# Patient Record
Sex: MALE | Race: WHITE | NOT HISPANIC OR LATINO
[De-identification: names, ages, dates, MRNs, and addresses within clinical notes are randomized per-mention and may not be internally consistent; named-entity substitution may affect disease eponyms.]

---

## 2018-02-15 ENCOUNTER — APPOINTMENT (OUTPATIENT)
Dept: INTERNAL MEDICINE | Facility: CLINIC | Age: 68
End: 2018-02-15

## 2018-03-12 ENCOUNTER — NON-APPOINTMENT (OUTPATIENT)
Age: 68
End: 2018-03-12

## 2018-03-12 ENCOUNTER — APPOINTMENT (OUTPATIENT)
Dept: INTERNAL MEDICINE | Facility: CLINIC | Age: 68
End: 2018-03-12
Payer: COMMERCIAL

## 2018-03-12 VITALS
RESPIRATION RATE: 16 BRPM | WEIGHT: 143 LBS | OXYGEN SATURATION: 94 % | BODY MASS INDEX: 20.47 KG/M2 | HEART RATE: 76 BPM | TEMPERATURE: 97.9 F | DIASTOLIC BLOOD PRESSURE: 80 MMHG | HEIGHT: 70 IN | SYSTOLIC BLOOD PRESSURE: 140 MMHG

## 2018-03-12 DIAGNOSIS — Z87.39 PERSONAL HISTORY OF OTHER DISEASES OF THE MUSCULOSKELETAL SYSTEM AND CONNECTIVE TISSUE: ICD-10-CM

## 2018-03-12 DIAGNOSIS — Z78.9 OTHER SPECIFIED HEALTH STATUS: ICD-10-CM

## 2018-03-12 DIAGNOSIS — N26.1 ATROPHY OF KIDNEY (TERMINAL): ICD-10-CM

## 2018-03-12 DIAGNOSIS — M48.00 SPINAL STENOSIS, SITE UNSPECIFIED: ICD-10-CM

## 2018-03-12 DIAGNOSIS — Z82.62 FAMILY HISTORY OF OSTEOPOROSIS: ICD-10-CM

## 2018-03-12 DIAGNOSIS — Z82.49 FAMILY HISTORY OF ISCHEMIC HEART DISEASE AND OTHER DISEASES OF THE CIRCULATORY SYSTEM: ICD-10-CM

## 2018-03-12 PROCEDURE — 99387 INIT PM E/M NEW PAT 65+ YRS: CPT | Mod: 25

## 2018-03-12 PROCEDURE — 93000 ELECTROCARDIOGRAM COMPLETE: CPT

## 2018-03-13 ENCOUNTER — LABORATORY RESULT (OUTPATIENT)
Age: 68
End: 2018-03-13

## 2018-03-14 ENCOUNTER — LABORATORY RESULT (OUTPATIENT)
Age: 68
End: 2018-03-14

## 2018-03-26 ENCOUNTER — TRANSCRIPTION ENCOUNTER (OUTPATIENT)
Age: 68
End: 2018-03-26

## 2018-07-02 ENCOUNTER — APPOINTMENT (OUTPATIENT)
Dept: INTERNAL MEDICINE | Facility: CLINIC | Age: 68
End: 2018-07-02
Payer: COMMERCIAL

## 2018-07-02 VITALS
HEART RATE: 61 BPM | RESPIRATION RATE: 16 BRPM | WEIGHT: 136 LBS | HEIGHT: 70 IN | SYSTOLIC BLOOD PRESSURE: 140 MMHG | BODY MASS INDEX: 19.47 KG/M2 | DIASTOLIC BLOOD PRESSURE: 80 MMHG | TEMPERATURE: 98.6 F | OXYGEN SATURATION: 97 %

## 2018-07-02 PROCEDURE — 99214 OFFICE O/P EST MOD 30 MIN: CPT

## 2018-07-02 NOTE — HISTORY OF PRESENT ILLNESS
[de-identified] : 67 year old  male patient with history of stable Hypertension, Hypercholesterolemia, Hyperkalemia, Elevated PTH, history as stated, presented for follow up examination of Elevated BP checked. Patient is compliant with all medications. Denies shortness of breath, chest pain or abdominal pains at this time. ROS as stated.\par

## 2018-07-02 NOTE — ASSESSMENT
[FreeTextEntry1] : 67 year old male found to have stable Hypertension, Hypercholesterolemia, Hyperkalemia, Elevated PTH,with the current regimen, diet and life style modifications, as counseled. Prior results reviewed and discussed with the patient during today's examination. Plan as ordered.\par Patient is refusing all immunizations, in spite of counseling risks and benefits.\par

## 2018-07-02 NOTE — HEALTH RISK ASSESSMENT
[No falls in past year] : Patient reported no falls in the past year [0] : 2) Feeling down, depressed, or hopeless: Not at all (0) [] : No [de-identified] : None [[id number]] : 0

## 2018-07-03 LAB
ALBUMIN SERPL ELPH-MCNC: 4.4 G/DL
ALP BLD-CCNC: 55 U/L
ALT SERPL-CCNC: 24 U/L
ANION GAP SERPL CALC-SCNC: 16 MMOL/L
AST SERPL-CCNC: 28 U/L
BILIRUB SERPL-MCNC: 0.4 MG/DL
BUN SERPL-MCNC: 27 MG/DL
CALCIUM SERPL-MCNC: 10 MG/DL
CHLORIDE SERPL-SCNC: 103 MMOL/L
CHOLEST SERPL-MCNC: 199 MG/DL
CHOLEST/HDLC SERPL: 3.3 RATIO
CO2 SERPL-SCNC: 25 MMOL/L
CREAT SERPL-MCNC: 1.27 MG/DL
GGT SERPL-CCNC: 16 U/L
GLUCOSE SERPL-MCNC: 98 MG/DL
HBA1C MFR BLD HPLC: 5.3 %
HDLC SERPL-MCNC: 61 MG/DL
LDLC SERPL CALC-MCNC: 126 MG/DL
POTASSIUM SERPL-SCNC: 4.7 MMOL/L
PROT SERPL-MCNC: 7.4 G/DL
SODIUM SERPL-SCNC: 144 MMOL/L
TRIGL SERPL-MCNC: 60 MG/DL

## 2018-07-05 LAB
BASOPHILS # BLD AUTO: 0.03 K/UL
BASOPHILS NFR BLD AUTO: 0.5 %
CALCIUM SERPL-MCNC: 10 MG/DL
EOSINOPHIL # BLD AUTO: 0.06 K/UL
EOSINOPHIL NFR BLD AUTO: 1.1 %
HCT VFR BLD CALC: 48.3 %
HCV AB SER QL: NONREACTIVE
HCV S/CO RATIO: 0.09 S/CO
HGB BLD-MCNC: 15.3 G/DL
IMM GRANULOCYTES NFR BLD AUTO: 0.2 %
LYMPHOCYTES # BLD AUTO: 1.25 K/UL
LYMPHOCYTES NFR BLD AUTO: 22.8 %
MAN DIFF?: NORMAL
MCHC RBC-ENTMCNC: 28.8 PG
MCHC RBC-ENTMCNC: 31.7 GM/DL
MCV RBC AUTO: 91 FL
MONOCYTES # BLD AUTO: 0.56 K/UL
MONOCYTES NFR BLD AUTO: 10.2 %
NEUTROPHILS # BLD AUTO: 3.57 K/UL
NEUTROPHILS NFR BLD AUTO: 65.2 %
PARATHYROID HORMONE INTACT: 79 PG/ML
PLATELET # BLD AUTO: 210 K/UL
RBC # BLD: 5.31 M/UL
RBC # FLD: 14.8 %
WBC # FLD AUTO: 5.48 K/UL

## 2018-07-22 PROBLEM — Z78.9 ALCOHOL USE: Status: INACTIVE | Noted: 2018-03-12

## 2018-07-26 ENCOUNTER — APPOINTMENT (OUTPATIENT)
Dept: DERMATOLOGY | Facility: CLINIC | Age: 68
End: 2018-07-26
Payer: COMMERCIAL

## 2018-07-26 VITALS
HEART RATE: 55 BPM | WEIGHT: 134 LBS | DIASTOLIC BLOOD PRESSURE: 85 MMHG | BODY MASS INDEX: 19.18 KG/M2 | OXYGEN SATURATION: 96 % | SYSTOLIC BLOOD PRESSURE: 169 MMHG | HEIGHT: 70 IN

## 2018-07-26 PROCEDURE — 17000 DESTRUCT PREMALG LESION: CPT | Mod: GC

## 2018-07-26 PROCEDURE — 99204 OFFICE O/P NEW MOD 45 MIN: CPT | Mod: 25,GC

## 2018-10-08 ENCOUNTER — APPOINTMENT (OUTPATIENT)
Dept: INTERNAL MEDICINE | Facility: CLINIC | Age: 68
End: 2018-10-08
Payer: COMMERCIAL

## 2018-10-08 VITALS
HEIGHT: 70 IN | BODY MASS INDEX: 19.47 KG/M2 | WEIGHT: 136 LBS | HEART RATE: 61 BPM | TEMPERATURE: 97.8 F | RESPIRATION RATE: 16 BRPM | OXYGEN SATURATION: 97 % | SYSTOLIC BLOOD PRESSURE: 140 MMHG | DIASTOLIC BLOOD PRESSURE: 80 MMHG

## 2018-10-08 DIAGNOSIS — R31.9 HEMATURIA, UNSPECIFIED: ICD-10-CM

## 2018-10-08 PROCEDURE — 99214 OFFICE O/P EST MOD 30 MIN: CPT

## 2018-10-08 NOTE — HISTORY OF PRESENT ILLNESS
[de-identified] : 68 year old  male patient with history of stable Hypertension, Hypercholesterolemia, Hyperkalemia, Elevated PTH, history as stated, presented for follow up examination of Elevated BP checked. Patient is compliant with all medications. Denies shortness of breath, chest pain or abdominal pains at this time. ROS as stated.\par

## 2018-10-08 NOTE — HEALTH RISK ASSESSMENT
[No falls in past year] : Patient reported no falls in the past year [0] : 2) Feeling down, depressed, or hopeless: Not at all (0) [] : No [de-identified] : None [[id number]] : 0

## 2018-10-08 NOTE — ASSESSMENT
[FreeTextEntry1] : 68 year old male found to have stable Hypertension, Hypercholesterolemia, Hyperkalemia, Elevated PTH,with the current regimen, diet and life style modifications, as counseled. Prior results reviewed and discussed with the patient during today's examination. Plan as ordered.\par

## 2018-10-08 NOTE — REVIEW OF SYSTEMS
[FreeTextEntry8] : One Episode of painful hematuria [FreeTextEntry9] : One episode of Left great toe pain.

## 2018-10-10 LAB
ALBUMIN SERPL ELPH-MCNC: 4.4 G/DL
ALP BLD-CCNC: 55 U/L
ALT SERPL-CCNC: 22 U/L
ANION GAP SERPL CALC-SCNC: 14 MMOL/L
APPEARANCE: CLEAR
AST SERPL-CCNC: 32 U/L
BASOPHILS # BLD AUTO: 0.02 K/UL
BASOPHILS NFR BLD AUTO: 0.3 %
BILIRUB SERPL-MCNC: 0.3 MG/DL
BILIRUBIN URINE: NEGATIVE
BLOOD URINE: NEGATIVE
BUN SERPL-MCNC: 20 MG/DL
CALCIUM SERPL-MCNC: 10 MG/DL
CALCIUM SERPL-MCNC: 10.4 MG/DL
CHLORIDE SERPL-SCNC: 101 MMOL/L
CHOLEST SERPL-MCNC: 178 MG/DL
CHOLEST/HDLC SERPL: 2.9 RATIO
CO2 SERPL-SCNC: 26 MMOL/L
COLOR: YELLOW
CREAT SERPL-MCNC: 1.18 MG/DL
EOSINOPHIL # BLD AUTO: 0.08 K/UL
EOSINOPHIL NFR BLD AUTO: 1.3 %
GGT SERPL-CCNC: 14 U/L
GLUCOSE QUALITATIVE U: NEGATIVE MG/DL
GLUCOSE SERPL-MCNC: 102 MG/DL
HBA1C MFR BLD HPLC: 5.3 %
HCT VFR BLD CALC: 47.5 %
HDLC SERPL-MCNC: 61 MG/DL
HGB BLD-MCNC: 15.4 G/DL
IMM GRANULOCYTES NFR BLD AUTO: 0.3 %
KETONES URINE: NEGATIVE
LDLC SERPL CALC-MCNC: 107 MG/DL
LEUKOCYTE ESTERASE URINE: NEGATIVE
LYMPHOCYTES # BLD AUTO: 1.01 K/UL
LYMPHOCYTES NFR BLD AUTO: 16.1 %
MAN DIFF?: NORMAL
MCHC RBC-ENTMCNC: 29.2 PG
MCHC RBC-ENTMCNC: 32.4 GM/DL
MCV RBC AUTO: 90.1 FL
MONOCYTES # BLD AUTO: 0.66 K/UL
MONOCYTES NFR BLD AUTO: 10.5 %
NEUTROPHILS # BLD AUTO: 4.48 K/UL
NEUTROPHILS NFR BLD AUTO: 71.5 %
NITRITE URINE: NEGATIVE
PARATHYROID HORMONE INTACT: 63 PG/ML
PH URINE: 7.5
PLATELET # BLD AUTO: 212 K/UL
POTASSIUM SERPL-SCNC: 4.3 MMOL/L
PROT SERPL-MCNC: 7.2 G/DL
PROTEIN URINE: NEGATIVE MG/DL
RBC # BLD: 5.27 M/UL
RBC # FLD: 14.8 %
SODIUM SERPL-SCNC: 141 MMOL/L
SPECIFIC GRAVITY URINE: 1.01
TRIGL SERPL-MCNC: 48 MG/DL
URATE SERPL-MCNC: 6.2 MG/DL
UROBILINOGEN URINE: NEGATIVE MG/DL
WBC # FLD AUTO: 6.27 K/UL

## 2018-10-30 ENCOUNTER — APPOINTMENT (OUTPATIENT)
Dept: DERMATOLOGY | Facility: CLINIC | Age: 68
End: 2018-10-30
Payer: COMMERCIAL

## 2018-10-30 VITALS
BODY MASS INDEX: 19.76 KG/M2 | SYSTOLIC BLOOD PRESSURE: 168 MMHG | HEIGHT: 70 IN | WEIGHT: 138 LBS | TEMPERATURE: 98.4 F | DIASTOLIC BLOOD PRESSURE: 99 MMHG | HEART RATE: 59 BPM

## 2018-10-30 PROCEDURE — 99213 OFFICE O/P EST LOW 20 MIN: CPT | Mod: 25

## 2018-10-30 PROCEDURE — 17110 DESTRUCTION B9 LES UP TO 14: CPT

## 2019-01-10 ENCOUNTER — APPOINTMENT (OUTPATIENT)
Dept: INTERNAL MEDICINE | Facility: CLINIC | Age: 69
End: 2019-01-10

## 2019-01-28 ENCOUNTER — APPOINTMENT (OUTPATIENT)
Dept: INTERNAL MEDICINE | Facility: CLINIC | Age: 69
End: 2019-01-28
Payer: COMMERCIAL

## 2019-01-28 VITALS
WEIGHT: 140 LBS | RESPIRATION RATE: 16 BRPM | DIASTOLIC BLOOD PRESSURE: 80 MMHG | HEIGHT: 70 IN | TEMPERATURE: 97.5 F | OXYGEN SATURATION: 96 % | BODY MASS INDEX: 20.04 KG/M2 | HEART RATE: 69 BPM | SYSTOLIC BLOOD PRESSURE: 130 MMHG

## 2019-01-28 PROCEDURE — 99214 OFFICE O/P EST MOD 30 MIN: CPT

## 2019-01-28 NOTE — HISTORY OF PRESENT ILLNESS
[de-identified] : 68 year old  male patient with history of stable Hypertension, Hypercholesterolemia, Hyperkalemia, Elevated PTH, history as stated, presented for follow up examination. Patient is compliant with all medications. Denies shortness of breath, chest pain or abdominal pains at this time. ROS as stated.\par

## 2019-01-28 NOTE — HEALTH RISK ASSESSMENT
[No falls in past year] : Patient reported no falls in the past year [0] : 2) Feeling down, depressed, or hopeless: Not at all (0) [] : No [de-identified] : DERM [[id number]] : 0

## 2019-03-04 ENCOUNTER — TRANSCRIPTION ENCOUNTER (OUTPATIENT)
Age: 69
End: 2019-03-04

## 2019-03-07 ENCOUNTER — APPOINTMENT (OUTPATIENT)
Dept: DERMATOLOGY | Facility: CLINIC | Age: 69
End: 2019-03-07
Payer: COMMERCIAL

## 2019-03-07 ENCOUNTER — LABORATORY RESULT (OUTPATIENT)
Age: 69
End: 2019-03-07

## 2019-03-07 VITALS
SYSTOLIC BLOOD PRESSURE: 175 MMHG | TEMPERATURE: 97.5 F | HEART RATE: 61 BPM | BODY MASS INDEX: 20.04 KG/M2 | WEIGHT: 140 LBS | DIASTOLIC BLOOD PRESSURE: 101 MMHG | HEIGHT: 70 IN

## 2019-03-07 PROCEDURE — 11102 TANGNTL BX SKIN SINGLE LES: CPT | Mod: GC

## 2019-03-07 PROCEDURE — 99213 OFFICE O/P EST LOW 20 MIN: CPT | Mod: 25,GC

## 2019-03-28 ENCOUNTER — RX RENEWAL (OUTPATIENT)
Age: 69
End: 2019-03-28

## 2019-03-28 ENCOUNTER — APPOINTMENT (OUTPATIENT)
Dept: DERMATOLOGY | Facility: CLINIC | Age: 69
End: 2019-03-28
Payer: COMMERCIAL

## 2019-03-28 VITALS
HEART RATE: 75 BPM | HEIGHT: 70 IN | DIASTOLIC BLOOD PRESSURE: 77 MMHG | BODY MASS INDEX: 20.04 KG/M2 | WEIGHT: 140 LBS | SYSTOLIC BLOOD PRESSURE: 157 MMHG | TEMPERATURE: 97.7 F

## 2019-03-28 PROCEDURE — 17110 DESTRUCTION B9 LES UP TO 14: CPT

## 2019-04-29 ENCOUNTER — APPOINTMENT (OUTPATIENT)
Dept: INTERNAL MEDICINE | Facility: CLINIC | Age: 69
End: 2019-04-29
Payer: MEDICARE

## 2019-04-29 ENCOUNTER — NON-APPOINTMENT (OUTPATIENT)
Age: 69
End: 2019-04-29

## 2019-04-29 VITALS
RESPIRATION RATE: 16 BRPM | HEART RATE: 62 BPM | TEMPERATURE: 97.6 F | DIASTOLIC BLOOD PRESSURE: 80 MMHG | SYSTOLIC BLOOD PRESSURE: 140 MMHG | HEIGHT: 70 IN | OXYGEN SATURATION: 98 % | BODY MASS INDEX: 19.76 KG/M2 | WEIGHT: 138 LBS

## 2019-04-29 PROCEDURE — 90471 IMMUNIZATION ADMIN: CPT

## 2019-04-29 PROCEDURE — 90715 TDAP VACCINE 7 YRS/> IM: CPT

## 2019-04-29 PROCEDURE — 99214 OFFICE O/P EST MOD 30 MIN: CPT | Mod: 25

## 2019-04-29 PROCEDURE — 93000 ELECTROCARDIOGRAM COMPLETE: CPT

## 2019-04-29 NOTE — HEALTH RISK ASSESSMENT
[No falls in past year] : Patient reported no falls in the past year [0] : 2) Feeling down, depressed, or hopeless: Not at all (0) [] : No [de-identified] : DERM [[id number]] : 0

## 2019-04-29 NOTE — HISTORY OF PRESENT ILLNESS
[de-identified] : 68 year old  male patient with history of stable Hypertension, Hypercholesterolemia, Hyperkalemia, Elevated PTH, history as stated, presented for follow up examination. Patient is compliant with all medications. Denies shortness of breath, chest pain or abdominal pains at this time. ROS as stated.\par

## 2019-04-29 NOTE — ASSESSMENT
[FreeTextEntry1] : 68 year old male found to have stable Hypertension, Hypercholesterolemia, Hyperkalemia, Elevated PTH,with the current regimen, diet and life style modifications, as counseled. Prior results reviewed and discussed with the patient during today's examination. Plan as ordered.\par EKG showed NSR at the rate of 61 BPM, non-sp ST-T changes noted.\par

## 2019-05-03 LAB
25(OH)D3 SERPL-MCNC: 51.5 NG/ML
ALBUMIN SERPL ELPH-MCNC: 4.5 G/DL
ALP BLD-CCNC: 63 U/L
ALT SERPL-CCNC: 16 U/L
ANION GAP SERPL CALC-SCNC: 15 MMOL/L
APPEARANCE: CLEAR
AST SERPL-CCNC: 21 U/L
BASOPHILS # BLD AUTO: 0.04 K/UL
BASOPHILS NFR BLD AUTO: 0.7 %
BILIRUB SERPL-MCNC: 0.5 MG/DL
BILIRUBIN URINE: NEGATIVE
BLOOD URINE: NEGATIVE
BUN SERPL-MCNC: 24 MG/DL
CALCIUM SERPL-MCNC: 9.6 MG/DL
CHLORIDE SERPL-SCNC: 101 MMOL/L
CHOLEST SERPL-MCNC: 228 MG/DL
CHOLEST/HDLC SERPL: 4.2 RATIO
CO2 SERPL-SCNC: 26 MMOL/L
COLOR: NORMAL
CREAT SERPL-MCNC: 1.23 MG/DL
CREAT SPEC-SCNC: 75 MG/DL
EOSINOPHIL # BLD AUTO: 0.07 K/UL
EOSINOPHIL NFR BLD AUTO: 1.2 %
ERYTHROCYTE [SEDIMENTATION RATE] IN BLOOD BY WESTERGREN METHOD: 3 MM/HR
ESTIMATED AVERAGE GLUCOSE: 105 MG/DL
FOLATE SERPL-MCNC: 19.1 NG/ML
GGT SERPL-CCNC: 12 U/L
GLUCOSE QUALITATIVE U: NEGATIVE
GLUCOSE SERPL-MCNC: 91 MG/DL
HBA1C MFR BLD HPLC: 5.3 %
HCT VFR BLD CALC: 50 %
HDLC SERPL-MCNC: 55 MG/DL
HEMOCCULT STL QL IA: NEGATIVE
HGB BLD-MCNC: 15.9 G/DL
IMM GRANULOCYTES NFR BLD AUTO: 0.3 %
IRON SATN MFR SERPL: 20 %
IRON SERPL-MCNC: 53 UG/DL
KETONES URINE: NEGATIVE
LDLC SERPL CALC-MCNC: 159 MG/DL
LEUKOCYTE ESTERASE URINE: NEGATIVE
LYMPHOCYTES # BLD AUTO: 1.2 K/UL
LYMPHOCYTES NFR BLD AUTO: 20.2 %
MAN DIFF?: NORMAL
MCHC RBC-ENTMCNC: 29.2 PG
MCHC RBC-ENTMCNC: 31.8 GM/DL
MCV RBC AUTO: 91.9 FL
MEV IGG FLD QL IA: 160 AU/ML
MEV IGG+IGM SER-IMP: POSITIVE
MICROALBUMIN 24H UR DL<=1MG/L-MCNC: 1.5 MG/DL
MICROALBUMIN/CREAT 24H UR-RTO: 20 MG/G
MONOCYTES # BLD AUTO: 0.58 K/UL
MONOCYTES NFR BLD AUTO: 9.8 %
NEUTROPHILS # BLD AUTO: 4.02 K/UL
NEUTROPHILS NFR BLD AUTO: 67.8 %
NITRITE URINE: NEGATIVE
PH URINE: 6.5
PLATELET # BLD AUTO: 215 K/UL
POTASSIUM SERPL-SCNC: 4.5 MMOL/L
PROT SERPL-MCNC: 6.8 G/DL
PROTEIN URINE: NEGATIVE
PSA FREE FLD-MCNC: 31 %
PSA FREE SERPL-MCNC: 0.32 NG/ML
PSA SERPL-MCNC: 1.04 NG/ML
RBC # BLD: 5.44 M/UL
RBC # FLD: 13.9 %
SODIUM SERPL-SCNC: 141 MMOL/L
SPECIFIC GRAVITY URINE: 1.01
T3 SERPL-MCNC: 89 NG/DL
T4 FREE SERPL-MCNC: 1.4 NG/DL
TIBC SERPL-MCNC: 260 UG/DL
TRIGL SERPL-MCNC: 68 MG/DL
TSH SERPL-ACNC: 1.73 UIU/ML
UIBC SERPL-MCNC: 207 UG/DL
UROBILINOGEN URINE: NORMAL
VIT B12 SERPL-MCNC: 731 PG/ML
WBC # FLD AUTO: 5.93 K/UL

## 2019-05-18 ENCOUNTER — EMERGENCY (EMERGENCY)
Facility: HOSPITAL | Age: 69
LOS: 1 days | Discharge: HOME | End: 2019-05-18
Attending: EMERGENCY MEDICINE | Admitting: EMERGENCY MEDICINE
Payer: MEDICARE

## 2019-05-18 VITALS
SYSTOLIC BLOOD PRESSURE: 170 MMHG | HEART RATE: 60 BPM | DIASTOLIC BLOOD PRESSURE: 94 MMHG | TEMPERATURE: 98 F | OXYGEN SATURATION: 98 % | RESPIRATION RATE: 18 BRPM

## 2019-05-18 PROCEDURE — 99283 EMERGENCY DEPT VISIT LOW MDM: CPT | Mod: 25

## 2019-05-18 PROCEDURE — 99053 MED SERV 10PM-8AM 24 HR FAC: CPT

## 2019-05-18 NOTE — ED ADULT TRIAGE NOTE - CHIEF COMPLAINT QUOTE
Pt. with hx of HTN c/o stiff neck that began Friday after lifting "some heavy things." Denies fever, chills, headache, chest pain. Appears in NAD. Ambulatory at present.

## 2019-05-19 VITALS
HEART RATE: 62 BPM | SYSTOLIC BLOOD PRESSURE: 170 MMHG | OXYGEN SATURATION: 100 % | DIASTOLIC BLOOD PRESSURE: 84 MMHG | RESPIRATION RATE: 16 BRPM

## 2019-05-19 RX ORDER — IBUPROFEN 200 MG
600 TABLET ORAL ONCE
Refills: 0 | Status: COMPLETED | OUTPATIENT
Start: 2019-05-19 | End: 2019-05-19

## 2019-05-19 RX ORDER — CYCLOBENZAPRINE HYDROCHLORIDE 10 MG/1
1 TABLET, FILM COATED ORAL
Qty: 12 | Refills: 0
Start: 2019-05-19 | End: 2019-05-22

## 2019-05-19 RX ORDER — IBUPROFEN 200 MG
1 TABLET ORAL
Qty: 16 | Refills: 0
Start: 2019-05-19 | End: 2019-05-22

## 2019-05-19 RX ORDER — LIDOCAINE 4 G/100G
1 CREAM TOPICAL ONCE
Refills: 0 | Status: COMPLETED | OUTPATIENT
Start: 2019-05-19 | End: 2019-05-19

## 2019-05-19 RX ADMIN — Medication 600 MILLIGRAM(S): at 03:10

## 2019-05-19 RX ADMIN — LIDOCAINE 1 PATCH: 4 CREAM TOPICAL at 03:10

## 2019-05-19 NOTE — ED ADULT NURSE REASSESSMENT NOTE - NS ED NURSE REASSESS COMMENT FT1
Pt resting in bed and appears in NAD. pt states pain feels much better. Pt endorses slight discomfort on R side of neck still, however states it is tolerable at this time.

## 2019-05-19 NOTE — ED PROVIDER NOTE - OBJECTIVE STATEMENT
69yo m pmh HTN p/w neck pain. Symptoms 3 days. Pt states he was lifting heavy water bottles. No hx of trauma.  No numbness, tingling, or weakness. Pain in right neck radiating to right side of head. No bowel or bladder incontinence. Pt has not taken anything for the pain. Pain worse w/ looking right. 67yo m pmh HTN p/w neck pain. Symptoms 3 days. Pt states he was lifting heavy water bottles. No hx of trauma.  No numbness, tingling, or weakness. Pain in right neck radiating to right side of head. No bowel or bladder incontinence. Pt has not taken anything for the pain. Pain worse w/ looking right.    69 y/o M w/ Hx HTN pw neck pain.  Pt was lifting water bottles and developed R sided neck pain.  No numbness/weakness, incontinence, fever, CP, SOB.  Pain increased w/ turning head.

## 2019-05-19 NOTE — ED PROVIDER NOTE - NSFOLLOWUPINSTRUCTIONS_ED_ALL_ED_FT
Please take motrin 600mg every 6 hours for pain. Return to ED if worsening pain or other concerning symptoms. You may take flexeril for breakthrough pain, you may not drive on flexeril.

## 2019-05-19 NOTE — ED PROVIDER NOTE - PHYSICAL EXAMINATION
Niall:  ***GEN - NAD; well appearing; A+O x3   ***PULMONARY - CTA b/l, symmetric breath sounds. ***CARDIAC -s1s2, RRR, no M,G,R  ***ABDOMEN - ND, NT, soft, no guarding, no rebound, no brianna's   ***SKIN - no rash or bruising   ***NEUROLOGIC - alert and oriented, follows commands, sensation nl, motor nl, ***PSYCH - insight and judgment nl, memory nl, affect nl, thought nl    R trap TTP

## 2019-05-19 NOTE — ED ADULT NURSE NOTE - OBJECTIVE STATEMENT
Pt brought to rm 2 complaining of b/l neck pain and spasms after heavy lifting. no trauma, bleeding, bruising noted. pt reports pain when moving neck, however is able to turn head/move neck with stiffness. pt able to ambulate, states he is able to drive, and appears in NAD.    denies SOB, difficulty breathing, chest pain, N/V, dizziness, palpitations.

## 2019-05-19 NOTE — ED PROVIDER NOTE - CLINICAL SUMMARY MEDICAL DECISION MAKING FREE TEXT BOX
pt with right trapezius spasm no e/o of cord/bony neck injury, will give lidoderm patch + motrin, re-eval

## 2019-05-19 NOTE — ED PROVIDER NOTE - ATTENDING CONTRIBUTION TO CARE
I, Danilo Tierney MD, personally saw the patient with the resident, and completed the key components of the history and physical exam. I then discussed the management plan with the resident.    pt w/ suspected R trap strain, no neuro deficits.  Well appearing, pain control and reeval.

## 2019-05-30 ENCOUNTER — RX RENEWAL (OUTPATIENT)
Age: 69
End: 2019-05-30

## 2019-10-28 ENCOUNTER — APPOINTMENT (OUTPATIENT)
Dept: INTERNAL MEDICINE | Facility: CLINIC | Age: 69
End: 2019-10-28
Payer: MEDICARE

## 2019-10-28 VITALS
SYSTOLIC BLOOD PRESSURE: 140 MMHG | WEIGHT: 140 LBS | OXYGEN SATURATION: 96 % | DIASTOLIC BLOOD PRESSURE: 70 MMHG | BODY MASS INDEX: 20.04 KG/M2 | HEART RATE: 62 BPM | HEIGHT: 70 IN | TEMPERATURE: 97.5 F | RESPIRATION RATE: 16 BRPM

## 2019-10-28 PROBLEM — I10 ESSENTIAL (PRIMARY) HYPERTENSION: Chronic | Status: ACTIVE | Noted: 2019-05-19

## 2019-10-28 PROCEDURE — 99214 OFFICE O/P EST MOD 30 MIN: CPT

## 2019-10-28 NOTE — HEALTH RISK ASSESSMENT
[No] : In the past 12 months have you used drugs other than those required for medical reasons? No [No falls in past year] : Patient reported no falls in the past year [0] : 2) Feeling down, depressed, or hopeless: Not at all (0) [] : No [de-identified] : None [[id number]] : 0

## 2019-10-28 NOTE — HISTORY OF PRESENT ILLNESS
[de-identified] : 69 year old  male patient with history of stable Hypertension, Hypercholesterolemia, Hyperkalemia, Elevated PTH, history as stated, presented for follow up examination. Patient is compliant with all medications. Denies shortness of breath, chest pain or abdominal pains at this time. ROS as stated.\par

## 2019-10-28 NOTE — ASSESSMENT
[FreeTextEntry1] : 69 year old male found to have stable Hypertension, Hypercholesterolemia, Hyperkalemia, Elevated PTH,with the current regimen, diet and life style modifications, as counseled. Prior results reviewed and discussed with the patient during today's examination. Plan as ordered.\par

## 2019-10-29 LAB
25(OH)D3 SERPL-MCNC: 55.2 NG/ML
ALBUMIN SERPL ELPH-MCNC: 4.6 G/DL
ALP BLD-CCNC: 65 U/L
ALT SERPL-CCNC: 25 U/L
ANION GAP SERPL CALC-SCNC: 12 MMOL/L
APPEARANCE: CLEAR
AST SERPL-CCNC: 28 U/L
BASOPHILS # BLD AUTO: 0.05 K/UL
BASOPHILS NFR BLD AUTO: 0.9 %
BILIRUB SERPL-MCNC: 0.4 MG/DL
BILIRUBIN URINE: NEGATIVE
BLOOD URINE: NEGATIVE
BUN SERPL-MCNC: 25 MG/DL
CALCIUM SERPL-MCNC: 9.9 MG/DL
CALCIUM SERPL-MCNC: 9.9 MG/DL
CHLORIDE SERPL-SCNC: 103 MMOL/L
CHOLEST SERPL-MCNC: 204 MG/DL
CHOLEST/HDLC SERPL: 3.5 RATIO
CO2 SERPL-SCNC: 24 MMOL/L
COLOR: NORMAL
CREAT SERPL-MCNC: 1.09 MG/DL
CREAT SPEC-SCNC: 46 MG/DL
EOSINOPHIL # BLD AUTO: 0.07 K/UL
EOSINOPHIL NFR BLD AUTO: 1.3 %
ESTIMATED AVERAGE GLUCOSE: 108 MG/DL
GGT SERPL-CCNC: 12 U/L
GLUCOSE QUALITATIVE U: NEGATIVE
GLUCOSE SERPL-MCNC: 94 MG/DL
HBA1C MFR BLD HPLC: 5.4 %
HCT VFR BLD CALC: 49.6 %
HDLC SERPL-MCNC: 58 MG/DL
HGB BLD-MCNC: 15.6 G/DL
IMM GRANULOCYTES NFR BLD AUTO: 0.5 %
KETONES URINE: NEGATIVE
LDLC SERPL CALC-MCNC: 136 MG/DL
LEUKOCYTE ESTERASE URINE: NEGATIVE
LYMPHOCYTES # BLD AUTO: 1.14 K/UL
LYMPHOCYTES NFR BLD AUTO: 20.4 %
MAN DIFF?: NORMAL
MCHC RBC-ENTMCNC: 29.1 PG
MCHC RBC-ENTMCNC: 31.5 GM/DL
MCV RBC AUTO: 92.4 FL
MICROALBUMIN 24H UR DL<=1MG/L-MCNC: 1.2 MG/DL
MICROALBUMIN/CREAT 24H UR-RTO: 26 MG/G
MONOCYTES # BLD AUTO: 0.56 K/UL
MONOCYTES NFR BLD AUTO: 10 %
NEUTROPHILS # BLD AUTO: 3.75 K/UL
NEUTROPHILS NFR BLD AUTO: 66.9 %
NITRITE URINE: NEGATIVE
PARATHYROID HORMONE INTACT: 76 PG/ML
PH URINE: 6.5
PHOSPHATE SERPL-MCNC: 3.3 MG/DL
PLATELET # BLD AUTO: 195 K/UL
POTASSIUM SERPL-SCNC: 4.5 MMOL/L
PROT SERPL-MCNC: 7 G/DL
PROTEIN URINE: NEGATIVE
RBC # BLD: 5.37 M/UL
RBC # FLD: 14.1 %
SODIUM SERPL-SCNC: 139 MMOL/L
SPECIFIC GRAVITY URINE: 1.01
T4 FREE SERPL-MCNC: 1.2 NG/DL
TRIGL SERPL-MCNC: 48 MG/DL
TSH SERPL-ACNC: 2.16 UIU/ML
URATE SERPL-MCNC: 6.7 MG/DL
UROBILINOGEN URINE: NORMAL
WBC # FLD AUTO: 5.6 K/UL

## 2020-04-20 ENCOUNTER — APPOINTMENT (OUTPATIENT)
Dept: INTERNAL MEDICINE | Facility: CLINIC | Age: 70
End: 2020-04-20

## 2020-07-21 ENCOUNTER — APPOINTMENT (OUTPATIENT)
Dept: INTERNAL MEDICINE | Facility: CLINIC | Age: 70
End: 2020-07-21
Payer: MEDICARE

## 2020-07-21 ENCOUNTER — NON-APPOINTMENT (OUTPATIENT)
Age: 70
End: 2020-07-21

## 2020-07-21 VITALS
BODY MASS INDEX: 18.04 KG/M2 | SYSTOLIC BLOOD PRESSURE: 166 MMHG | TEMPERATURE: 97.8 F | WEIGHT: 126 LBS | HEIGHT: 70 IN | RESPIRATION RATE: 16 BRPM | HEART RATE: 66 BPM | DIASTOLIC BLOOD PRESSURE: 88 MMHG | OXYGEN SATURATION: 97 %

## 2020-07-21 PROCEDURE — G0439: CPT | Mod: 25

## 2020-07-21 PROCEDURE — 93000 ELECTROCARDIOGRAM COMPLETE: CPT

## 2020-07-21 NOTE — HISTORY OF PRESENT ILLNESS
[de-identified] : 69 year old male patient with history of stable Hypertension, Hypercholesterolemia, Hyperkalemia, Elevated PTH, history as stated, presented for an annual preventative examination.\par Patient denies any associated symptoms of shortness of breath, chest pain, abdominal pain at this time.\par

## 2020-07-21 NOTE — HEALTH RISK ASSESSMENT
[Good] : ~his/her~  mood as  good [No] : No [No falls in past year] : Patient reported no falls in the past year [0] : 1) Little interest or pleasure doing things: Not at all (0) [HIV test declined] : HIV test declined [Feels Safe at Home] : Feels safe at home [None] : None [Independent] : managing medications [Some assistance needed] : doing laundry [Smoke Detector] : smoke detector [Carbon Monoxide Detector] : carbon monoxide detector [Sunscreen] : uses sunscreen [Seat Belt] :  uses seat belt [With Patient/Caregiver] : With Patient/Caregiver [Patient reported colonoscopy was normal] : Patient reported colonoscopy was normal [FreeTextEntry1] : Check up\par  [[id number]] : 0 [] : No [de-identified] : None [Change in mental status noted] : No change in mental status noted [Reports changes in hearing] : Reports no changes in hearing [Reports changes in vision] : Reports no changes in vision [Reports changes in dental health] : Reports no changes in dental health [ColonoscopyDate] : 01/11 [ColonoscopyComments] : As ordered for today. [HepatitisCDate] : 07/18 [HepatitisCComments] : Negative [AdvancecareDate] : 07/20

## 2020-07-21 NOTE — ASSESSMENT
[FreeTextEntry1] : 69 year old male found to have stable Hypertension, Hypercholesterolemia, Hyperkalemia, Elevated PTH,with the current regimen, diet and life style modifications, as counseled. Prior results reviewed and discussed with the patient during today's examination. Plan as ordered.\par EKG showed sinus bradycardia at the rate of 54 BPM, non-sp ST-T changes noted.\par

## 2020-07-23 LAB
25(OH)D3 SERPL-MCNC: 55.2 NG/ML
ALBUMIN SERPL ELPH-MCNC: 4.6 G/DL
ALP BLD-CCNC: 54 U/L
ALT SERPL-CCNC: 16 U/L
ANION GAP SERPL CALC-SCNC: 15 MMOL/L
APPEARANCE: CLEAR
AST SERPL-CCNC: 24 U/L
BASOPHILS # BLD AUTO: 0.04 K/UL
BASOPHILS NFR BLD AUTO: 0.6 %
BILIRUB SERPL-MCNC: 0.3 MG/DL
BILIRUBIN URINE: NEGATIVE
BLOOD URINE: NEGATIVE
BUN SERPL-MCNC: 18 MG/DL
CALCIUM SERPL-MCNC: 9.7 MG/DL
CALCIUM SERPL-MCNC: 9.7 MG/DL
CHLORIDE SERPL-SCNC: 106 MMOL/L
CHOLEST SERPL-MCNC: 181 MG/DL
CHOLEST/HDLC SERPL: 3.5 RATIO
CO2 SERPL-SCNC: 24 MMOL/L
COLOR: NORMAL
CREAT SERPL-MCNC: 1.17 MG/DL
CREAT SPEC-SCNC: 56 MG/DL
EOSINOPHIL # BLD AUTO: 0.16 K/UL
EOSINOPHIL NFR BLD AUTO: 2.3 %
ERYTHROCYTE [SEDIMENTATION RATE] IN BLOOD BY WESTERGREN METHOD: 4 MM/HR
ESTIMATED AVERAGE GLUCOSE: 103 MG/DL
FOLATE SERPL-MCNC: 11.2 NG/ML
GGT SERPL-CCNC: 11 U/L
GLUCOSE QUALITATIVE U: NEGATIVE
GLUCOSE SERPL-MCNC: 98 MG/DL
HBA1C MFR BLD HPLC: 5.2 %
HCT VFR BLD CALC: 47.5 %
HDLC SERPL-MCNC: 52 MG/DL
HEMOCCULT STL QL IA: NEGATIVE
HGB BLD-MCNC: 14.9 G/DL
IMM GRANULOCYTES NFR BLD AUTO: 0.3 %
IRON SATN MFR SERPL: 24 %
IRON SERPL-MCNC: 54 UG/DL
KETONES URINE: NEGATIVE
LDLC SERPL CALC-MCNC: 103 MG/DL
LEUKOCYTE ESTERASE URINE: NEGATIVE
LYMPHOCYTES # BLD AUTO: 1.34 K/UL
LYMPHOCYTES NFR BLD AUTO: 19 %
MAN DIFF?: NORMAL
MCHC RBC-ENTMCNC: 30 PG
MCHC RBC-ENTMCNC: 31.4 GM/DL
MCV RBC AUTO: 95.8 FL
MICROALBUMIN 24H UR DL<=1MG/L-MCNC: <1.2 MG/DL
MICROALBUMIN/CREAT 24H UR-RTO: NORMAL MG/G
MONOCYTES # BLD AUTO: 0.62 K/UL
MONOCYTES NFR BLD AUTO: 8.8 %
NEUTROPHILS # BLD AUTO: 4.88 K/UL
NEUTROPHILS NFR BLD AUTO: 69 %
NITRITE URINE: NEGATIVE
PARATHYROID HORMONE INTACT: 85 PG/ML
PH URINE: 7
PHOSPHATE SERPL-MCNC: 3.8 MG/DL
PLATELET # BLD AUTO: 203 K/UL
POTASSIUM SERPL-SCNC: 5.9 MMOL/L
PROT SERPL-MCNC: 6.8 G/DL
PROTEIN URINE: NEGATIVE
PSA FREE FLD-MCNC: 35 %
PSA FREE SERPL-MCNC: 0.34 NG/ML
PSA SERPL-MCNC: 0.95 NG/ML
RBC # BLD: 4.96 M/UL
RBC # FLD: 14 %
SARS-COV-2 IGG SERPL IA-ACNC: <0.1 INDEX
SARS-COV-2 IGG SERPL QL IA: NEGATIVE
SODIUM SERPL-SCNC: 145 MMOL/L
SPECIFIC GRAVITY URINE: 1.01
T3 SERPL-MCNC: 92 NG/DL
T4 FREE SERPL-MCNC: 1.4 NG/DL
TIBC SERPL-MCNC: 229 UG/DL
TRIGL SERPL-MCNC: 128 MG/DL
TSH SERPL-ACNC: 1.93 UIU/ML
UIBC SERPL-MCNC: 174 UG/DL
URATE SERPL-MCNC: 6.2 MG/DL
UROBILINOGEN URINE: NORMAL
VIT B12 SERPL-MCNC: 502 PG/ML
WBC # FLD AUTO: 7.06 K/UL

## 2020-09-09 ENCOUNTER — OUTPATIENT (OUTPATIENT)
Dept: OUTPATIENT SERVICES | Facility: HOSPITAL | Age: 70
LOS: 1 days | End: 2020-09-09

## 2020-09-09 ENCOUNTER — APPOINTMENT (OUTPATIENT)
Dept: CT IMAGING | Facility: CLINIC | Age: 70
End: 2020-09-09
Payer: MEDICARE

## 2020-09-09 PROCEDURE — 74177 CT ABD & PELVIS W/CONTRAST: CPT | Mod: 26

## 2020-09-10 ENCOUNTER — APPOINTMENT (OUTPATIENT)
Dept: DERMATOLOGY | Facility: CLINIC | Age: 70
End: 2020-09-10
Payer: MEDICARE

## 2020-09-10 VITALS — HEIGHT: 70 IN | BODY MASS INDEX: 18.04 KG/M2 | WEIGHT: 126 LBS

## 2020-09-10 DIAGNOSIS — D17.9 BENIGN LIPOMATOUS NEOPLASM, UNSPECIFIED: ICD-10-CM

## 2020-09-10 DIAGNOSIS — D18.01 HEMANGIOMA OF SKIN AND SUBCUTANEOUS TISSUE: ICD-10-CM

## 2020-09-10 DIAGNOSIS — L57.0 ACTINIC KERATOSIS: ICD-10-CM

## 2020-09-10 DIAGNOSIS — L82.1 OTHER SEBORRHEIC KERATOSIS: ICD-10-CM

## 2020-09-10 PROCEDURE — 17003 DESTRUCT PREMALG LES 2-14: CPT

## 2020-09-10 PROCEDURE — 17000 DESTRUCT PREMALG LESION: CPT

## 2020-09-10 PROCEDURE — 99214 OFFICE O/P EST MOD 30 MIN: CPT | Mod: 25

## 2021-01-11 ENCOUNTER — APPOINTMENT (OUTPATIENT)
Dept: INTERNAL MEDICINE | Facility: CLINIC | Age: 71
End: 2021-01-11
Payer: MEDICARE

## 2021-01-11 VITALS
RESPIRATION RATE: 16 BRPM | HEART RATE: 66 BPM | HEIGHT: 70 IN | WEIGHT: 137 LBS | OXYGEN SATURATION: 98 % | SYSTOLIC BLOOD PRESSURE: 150 MMHG | TEMPERATURE: 98 F | DIASTOLIC BLOOD PRESSURE: 81 MMHG | BODY MASS INDEX: 19.61 KG/M2

## 2021-01-11 DIAGNOSIS — A04.8 OTHER SPECIFIED BACTERIAL INTESTINAL INFECTIONS: ICD-10-CM

## 2021-01-11 DIAGNOSIS — G62.9 POLYNEUROPATHY, UNSPECIFIED: ICD-10-CM

## 2021-01-11 PROCEDURE — 99072 ADDL SUPL MATRL&STAF TM PHE: CPT

## 2021-01-11 PROCEDURE — 99214 OFFICE O/P EST MOD 30 MIN: CPT

## 2021-01-11 NOTE — HEALTH RISK ASSESSMENT
[No] : In the past 12 months have you used drugs other than those required for medical reasons? No [No falls in past year] : Patient reported no falls in the past year [0] : 2) Feeling down, depressed, or hopeless: Not at all (0) [] : No [de-identified] : NEURO/GI [[id number]] : 0

## 2021-01-11 NOTE — ASSESSMENT
[FreeTextEntry1] : 70 year old male found to have stable Hypertension, Hypercholesterolemia, Hyperkalemia, Elevated PTH, Lumbar Radiculopathy, Peripheral Neuropathy, with the current prescription regimen as recommended, diet and life style modifications, as counseled. Prior results reviewed, interpreted and discussed with the patient during today's examination, as appropriate. Follow up, treatment plan and tests, as ordered.\par Patient was recently evaluated by GI/NEURO , findings and recommendations reviewed with the patient during today's examination.\par GI F/U for definitive Rx for asymptomatic H. Pylori Gastritis/Infection, as appropriate and directed. oral temp 102.6

## 2021-01-11 NOTE — HISTORY OF PRESENT ILLNESS
[de-identified] : 70 year old  male patient with history of stable Hypertension, Hypercholesterolemia, Hyperkalemia, Elevated PTH, history as stated, presented for follow up examination. Patient is compliant with all medications. Denies shortness of breath, chest pain or abdominal pains at this time. ROS as stated.\par

## 2021-01-12 LAB
ALBUMIN SERPL ELPH-MCNC: 4.6 G/DL
ALP BLD-CCNC: 69 U/L
ALT SERPL-CCNC: 37 U/L
ANION GAP SERPL CALC-SCNC: 11 MMOL/L
AST SERPL-CCNC: 32 U/L
BASOPHILS # BLD AUTO: 0.03 K/UL
BASOPHILS NFR BLD AUTO: 0.5 %
BILIRUB SERPL-MCNC: 0.2 MG/DL
BUN SERPL-MCNC: 25 MG/DL
CALCIUM SERPL-MCNC: 9.8 MG/DL
CALCIUM SERPL-MCNC: 9.8 MG/DL
CHLORIDE SERPL-SCNC: 106 MMOL/L
CHOLEST SERPL-MCNC: 172 MG/DL
CO2 SERPL-SCNC: 26 MMOL/L
CREAT SERPL-MCNC: 1.17 MG/DL
EOSINOPHIL # BLD AUTO: 0.08 K/UL
EOSINOPHIL NFR BLD AUTO: 1.2 %
ESTIMATED AVERAGE GLUCOSE: 100 MG/DL
GGT SERPL-CCNC: 11 U/L
GLUCOSE SERPL-MCNC: 90 MG/DL
HBA1C MFR BLD HPLC: 5.1 %
HCT VFR BLD CALC: 49.3 %
HDLC SERPL-MCNC: 56 MG/DL
HGB BLD-MCNC: 15 G/DL
IMM GRANULOCYTES NFR BLD AUTO: 0.3 %
LDLC SERPL CALC-MCNC: 103 MG/DL
LYMPHOCYTES # BLD AUTO: 0.97 K/UL
LYMPHOCYTES NFR BLD AUTO: 14.8 %
MAN DIFF?: NORMAL
MCHC RBC-ENTMCNC: 29.6 PG
MCHC RBC-ENTMCNC: 30.4 GM/DL
MCV RBC AUTO: 97.2 FL
MONOCYTES # BLD AUTO: 0.61 K/UL
MONOCYTES NFR BLD AUTO: 9.3 %
NEUTROPHILS # BLD AUTO: 4.86 K/UL
NEUTROPHILS NFR BLD AUTO: 73.9 %
NONHDLC SERPL-MCNC: 116 MG/DL
PARATHYROID HORMONE INTACT: 97 PG/ML
PLATELET # BLD AUTO: 195 K/UL
POTASSIUM SERPL-SCNC: 4.8 MMOL/L
PROT SERPL-MCNC: 6.7 G/DL
RBC # BLD: 5.07 M/UL
RBC # FLD: 14.5 %
SODIUM SERPL-SCNC: 142 MMOL/L
TRIGL SERPL-MCNC: 63 MG/DL
WBC # FLD AUTO: 6.57 K/UL

## 2021-05-14 ENCOUNTER — NON-APPOINTMENT (OUTPATIENT)
Age: 71
End: 2021-05-14

## 2021-05-26 ENCOUNTER — NON-APPOINTMENT (OUTPATIENT)
Age: 71
End: 2021-05-26

## 2021-07-19 ENCOUNTER — APPOINTMENT (OUTPATIENT)
Dept: INTERNAL MEDICINE | Facility: CLINIC | Age: 71
End: 2021-07-19
Payer: MEDICARE

## 2021-07-19 ENCOUNTER — NON-APPOINTMENT (OUTPATIENT)
Age: 71
End: 2021-07-19

## 2021-07-19 VITALS
OXYGEN SATURATION: 98 % | SYSTOLIC BLOOD PRESSURE: 140 MMHG | DIASTOLIC BLOOD PRESSURE: 80 MMHG | TEMPERATURE: 98 F | HEIGHT: 70 IN | BODY MASS INDEX: 20.04 KG/M2 | WEIGHT: 140 LBS | HEART RATE: 65 BPM | RESPIRATION RATE: 16 BRPM

## 2021-07-19 DIAGNOSIS — K86.89 OTHER SPECIFIED DISEASES OF PANCREAS: ICD-10-CM

## 2021-07-19 PROCEDURE — 93000 ELECTROCARDIOGRAM COMPLETE: CPT

## 2021-07-19 PROCEDURE — G0439: CPT

## 2021-07-19 NOTE — ASSESSMENT
[FreeTextEntry1] : 70 year old male found to have stable Hypertension, Hypercholesterolemia, Hyperkalemia, Elevated PTH,with the current prescription regimen as recommended, diet and life style modifications, as counseled. Prior results reviewed, interpreted and discussed with the patient during today's examination, as appropriate. Follow up, treatment plan and tests, as ordered.\par \par EKG showed known sinus bradycardia at the rate of 58 BPM, non-sp ST-T changes noted.\par

## 2021-07-19 NOTE — HISTORY OF PRESENT ILLNESS
[de-identified] : 70 year old male patient with history of stable Hypertension, Hypercholesterolemia, Hyperkalemia, Elevated PTH, history as stated, presented for an annual preventative examination.\par Patient denies any associated symptoms of shortness of breath, chest pain, abdominal pain at this time.\par

## 2021-07-19 NOTE — HEALTH RISK ASSESSMENT
[Good] : ~his/her~  mood as  good [No] : In the past 12 months have you used drugs other than those required for medical reasons? No [0] : 2) Feeling down, depressed, or hopeless: Not at all (0) [Patient reported colonoscopy was normal] : Patient reported colonoscopy was normal [HIV test declined] : HIV test declined [None] : None [Feels Safe at Home] : Feels safe at home [Independent] : managing finances [Some assistance needed] : doing laundry [Smoke Detector] : smoke detector [Carbon Monoxide Detector] : carbon monoxide detector [Seat Belt] :  uses seat belt [Sunscreen] : uses sunscreen [With Patient/Caregiver] : , with patient/caregiver [FreeTextEntry1] : Check up\par  [Intercurrent hospitalizations] : was admitted to the hospital  [] : No [Any fall with injury in past year] : Patient reported fall with injury in the past year [Assistive Device] : Patient uses an assistive device [de-identified] : 05/21 [de-identified] : None [de-identified] : Cane [[id number]] : 0 [Change in mental status noted] : No change in mental status noted [Reports changes in hearing] : Reports no changes in hearing [Reports changes in vision] : Reports no changes in vision [Reports changes in dental health] : Reports no changes in dental health [ColonoscopyDate] : 10/20 [HepatitisCDate] : 07/18 [HepatitisCComments] : Negative [AdvancecareDate] : 07/21

## 2021-07-21 LAB
25(OH)D3 SERPL-MCNC: 62.4 NG/ML
ALBUMIN SERPL ELPH-MCNC: 4.5 G/DL
ALP BLD-CCNC: 93 U/L
ALT SERPL-CCNC: 17 U/L
AMYLASE/CREAT SERPL: 99 U/L
ANION GAP SERPL CALC-SCNC: 14 MMOL/L
APPEARANCE: CLEAR
AST SERPL-CCNC: 23 U/L
BASOPHILS # BLD AUTO: 0.02 K/UL
BASOPHILS NFR BLD AUTO: 0.4 %
BILIRUB SERPL-MCNC: 0.3 MG/DL
BILIRUBIN URINE: NEGATIVE
BLOOD URINE: NEGATIVE
BUN SERPL-MCNC: 20 MG/DL
CALCIUM ?TM UR-MCNC: 6.3 MG/DL
CALCIUM SERPL-MCNC: 9.7 MG/DL
CALCIUM SERPL-MCNC: 9.8 MG/DL
CHLORIDE SERPL-SCNC: 107 MMOL/L
CHOLEST SERPL-MCNC: 175 MG/DL
CO2 SERPL-SCNC: 25 MMOL/L
COLOR: YELLOW
CREAT SERPL-MCNC: 1.18 MG/DL
CREAT SPEC-SCNC: 166 MG/DL
EOSINOPHIL # BLD AUTO: 0.04 K/UL
EOSINOPHIL NFR BLD AUTO: 0.8 %
ERYTHROCYTE [SEDIMENTATION RATE] IN BLOOD BY WESTERGREN METHOD: 11 MM/HR
ESTIMATED AVERAGE GLUCOSE: 103 MG/DL
FOLATE SERPL-MCNC: 17.9 NG/ML
GGT SERPL-CCNC: 11 U/L
GLUCOSE QUALITATIVE U: NEGATIVE
GLUCOSE SERPL-MCNC: 89 MG/DL
HBA1C MFR BLD HPLC: 5.2 %
HCT VFR BLD CALC: 48.9 %
HDLC SERPL-MCNC: 51 MG/DL
HEMOCCULT STL QL IA: NEGATIVE
HGB BLD-MCNC: 15.7 G/DL
IMM GRANULOCYTES NFR BLD AUTO: 0.6 %
IRON SATN MFR SERPL: 16 %
IRON SERPL-MCNC: 42 UG/DL
KETONES URINE: ABNORMAL
LDLC SERPL CALC-MCNC: 112 MG/DL
LEUKOCYTE ESTERASE URINE: NEGATIVE
LPL SERPL-CCNC: 28 U/L
LYMPHOCYTES # BLD AUTO: 1.01 K/UL
LYMPHOCYTES NFR BLD AUTO: 19.6 %
MAN DIFF?: NORMAL
MCHC RBC-ENTMCNC: 29.6 PG
MCHC RBC-ENTMCNC: 32.1 GM/DL
MCV RBC AUTO: 92.3 FL
MICROALBUMIN 24H UR DL<=1MG/L-MCNC: 2.1 MG/DL
MICROALBUMIN/CREAT 24H UR-RTO: 13 MG/G
MONOCYTES # BLD AUTO: 0.42 K/UL
MONOCYTES NFR BLD AUTO: 8.1 %
NEUTROPHILS # BLD AUTO: 3.64 K/UL
NEUTROPHILS NFR BLD AUTO: 70.5 %
NITRITE URINE: NEGATIVE
NONHDLC SERPL-MCNC: 124 MG/DL
PARATHYROID HORMONE INTACT: 65 PG/ML
PH URINE: 6
PHOSPHATE SERPL-MCNC: 3 MG/DL
PLATELET # BLD AUTO: 209 K/UL
POTASSIUM SERPL-SCNC: 5.1 MMOL/L
POTASSIUM UR-SCNC: 83.9 MMOL/L
PROT SERPL-MCNC: 6.8 G/DL
PROTEIN URINE: NORMAL
PSA FREE FLD-MCNC: 26 %
PSA FREE SERPL-MCNC: 0.38 NG/ML
PSA SERPL-MCNC: 1.46 NG/ML
RBC # BLD: 5.3 M/UL
RBC # FLD: 14.5 %
SODIUM ?TM SUB UR QN: 100 MMOL/L
SODIUM SERPL-SCNC: 146 MMOL/L
SPECIFIC GRAVITY URINE: 1.02
T3 SERPL-MCNC: 94 NG/DL
T4 FREE SERPL-MCNC: 1.4 NG/DL
TIBC SERPL-MCNC: 265 UG/DL
TRIGL SERPL-MCNC: 60 MG/DL
TSH SERPL-ACNC: 1.29 UIU/ML
UIBC SERPL-MCNC: 223 UG/DL
URATE SERPL-MCNC: 7.1 MG/DL
UROBILINOGEN URINE: NORMAL
VIT B12 SERPL-MCNC: 691 PG/ML
WBC # FLD AUTO: 5.16 K/UL

## 2021-09-13 DIAGNOSIS — Z87.81 PERSONAL HISTORY OF (HEALED) TRAUMATIC FRACTURE: ICD-10-CM

## 2021-10-12 ENCOUNTER — LABORATORY RESULT (OUTPATIENT)
Age: 71
End: 2021-10-12

## 2021-10-12 ENCOUNTER — NON-APPOINTMENT (OUTPATIENT)
Age: 71
End: 2021-10-12

## 2022-01-10 ENCOUNTER — NON-APPOINTMENT (OUTPATIENT)
Age: 72
End: 2022-01-10

## 2022-01-24 ENCOUNTER — APPOINTMENT (OUTPATIENT)
Dept: INTERNAL MEDICINE | Facility: CLINIC | Age: 72
End: 2022-01-24

## 2022-03-01 ENCOUNTER — APPOINTMENT (OUTPATIENT)
Dept: INTERNAL MEDICINE | Facility: CLINIC | Age: 72
End: 2022-03-01
Payer: MEDICARE

## 2022-03-01 VITALS
BODY MASS INDEX: 19.9 KG/M2 | DIASTOLIC BLOOD PRESSURE: 80 MMHG | HEART RATE: 59 BPM | RESPIRATION RATE: 16 BRPM | SYSTOLIC BLOOD PRESSURE: 168 MMHG | HEIGHT: 70 IN | OXYGEN SATURATION: 96 % | WEIGHT: 139 LBS | TEMPERATURE: 97.1 F

## 2022-03-01 DIAGNOSIS — M72.0 PALMAR FASCIAL FIBROMATOSIS [DUPUYTREN]: ICD-10-CM

## 2022-03-01 PROCEDURE — 99214 OFFICE O/P EST MOD 30 MIN: CPT

## 2022-03-01 NOTE — HISTORY OF PRESENT ILLNESS
[de-identified] : 71 year old  male patient with history of stable Hypertension, Hypercholesterolemia, Hyperkalemia, Elevated PTH, history as stated, presented for follow up examination. Patient is compliant with all medications. Denies shortness of breath, chest pain or abdominal pains at this time. ROS as stated.\par

## 2022-03-01 NOTE — ASSESSMENT
[FreeTextEntry1] : 71 year old male found to have stable Hypertension, Hypercholesterolemia, Hyperkalemia, Elevated PTH, Osteopenia, Microalbuminuria, with the current prescription regimen as recommended, diet and life style modifications, as counseled. Prior results reviewed, interpreted and discussed with the patient during today's examination, as appropriate. Follow up, treatment plan and tests, as ordered.\par \par Total time spent : 30 minutes\par Including:\par Preparation prior to visit - Reviewing prior record, results of tests and Consultation Reports as applicable\par Conducting an appropriate H & P during today's encounter\par Appropriate orders for tests, medications and procedures, as applicable\par Counseling patient \par Note completion\par

## 2022-03-02 LAB
ALBUMIN SERPL ELPH-MCNC: 4.6 G/DL
ALP BLD-CCNC: 74 U/L
ALT SERPL-CCNC: 16 U/L
ANION GAP SERPL CALC-SCNC: 13 MMOL/L
AST SERPL-CCNC: 24 U/L
BASOPHILS # BLD AUTO: 0.03 K/UL
BASOPHILS NFR BLD AUTO: 0.6 %
BILIRUB SERPL-MCNC: 0.5 MG/DL
BUN SERPL-MCNC: 22 MG/DL
CALCIUM SERPL-MCNC: 9.5 MG/DL
CALCIUM SERPL-MCNC: 9.5 MG/DL
CHLORIDE SERPL-SCNC: 103 MMOL/L
CHOLEST SERPL-MCNC: 206 MG/DL
CO2 SERPL-SCNC: 25 MMOL/L
CREAT SERPL-MCNC: 0.93 MG/DL
EGFR: 88 ML/MIN/1.73M2
EOSINOPHIL # BLD AUTO: 0.06 K/UL
EOSINOPHIL NFR BLD AUTO: 1.1 %
ESTIMATED AVERAGE GLUCOSE: 103 MG/DL
GGT SERPL-CCNC: 11 U/L
GLUCOSE SERPL-MCNC: 90 MG/DL
HBA1C MFR BLD HPLC: 5.2 %
HCT VFR BLD CALC: 48.9 %
HDLC SERPL-MCNC: 54 MG/DL
HGB BLD-MCNC: 15.2 G/DL
IMM GRANULOCYTES NFR BLD AUTO: 0.4 %
LDLC SERPL CALC-MCNC: 138 MG/DL
LYMPHOCYTES # BLD AUTO: 1.03 K/UL
LYMPHOCYTES NFR BLD AUTO: 19 %
MAN DIFF?: NORMAL
MCHC RBC-ENTMCNC: 29.6 PG
MCHC RBC-ENTMCNC: 31.1 GM/DL
MCV RBC AUTO: 95.3 FL
MONOCYTES # BLD AUTO: 0.51 K/UL
MONOCYTES NFR BLD AUTO: 9.4 %
NEUTROPHILS # BLD AUTO: 3.77 K/UL
NEUTROPHILS NFR BLD AUTO: 69.5 %
NONHDLC SERPL-MCNC: 153 MG/DL
PARATHYROID HORMONE INTACT: 84 PG/ML
PHOSPHATE SERPL-MCNC: 3.4 MG/DL
PLATELET # BLD AUTO: 214 K/UL
POTASSIUM SERPL-SCNC: 3.9 MMOL/L
PROT SERPL-MCNC: 6.8 G/DL
RBC # BLD: 5.13 M/UL
RBC # FLD: 14.2 %
SODIUM SERPL-SCNC: 142 MMOL/L
TRIGL SERPL-MCNC: 74 MG/DL
WBC # FLD AUTO: 5.42 K/UL

## 2022-11-07 ENCOUNTER — APPOINTMENT (OUTPATIENT)
Dept: INTERNAL MEDICINE | Facility: CLINIC | Age: 72
End: 2022-11-07

## 2022-11-07 ENCOUNTER — NON-APPOINTMENT (OUTPATIENT)
Age: 72
End: 2022-11-07

## 2022-11-07 VITALS
HEART RATE: 58 BPM | DIASTOLIC BLOOD PRESSURE: 85 MMHG | RESPIRATION RATE: 16 BRPM | BODY MASS INDEX: 19.9 KG/M2 | WEIGHT: 139 LBS | TEMPERATURE: 98 F | HEIGHT: 70 IN | SYSTOLIC BLOOD PRESSURE: 157 MMHG

## 2022-11-07 DIAGNOSIS — Z00.00 ENCOUNTER FOR GENERAL ADULT MEDICAL EXAMINATION W/OUT ABNORMAL FINDINGS: ICD-10-CM

## 2022-11-07 PROCEDURE — 93000 ELECTROCARDIOGRAM COMPLETE: CPT

## 2022-11-07 PROCEDURE — G0439: CPT

## 2022-11-07 NOTE — HEALTH RISK ASSESSMENT
[FreeTextEntry1] : Check up\par  [de-identified] : 05/21 [de-identified] : None [de-identified] : Cane [[id number]] : 0 [Change in mental status noted] : No change in mental status noted [Reports changes in hearing] : Reports no changes in hearing [Reports changes in vision] : Reports no changes in vision [Reports changes in dental health] : Reports no changes in dental health [ColonoscopyDate] : 10/20 [HepatitisCDate] : 07/18 [HepatitisCComments] : Negative [AdvancecareDate] : 11/22

## 2022-11-07 NOTE — ASSESSMENT
[FreeTextEntry1] : 72 year old male found to have stable Hypertension, Hypercholesterolemia, Hyperkalemia, Elevated PTH,with the current prescription regimen as recommended, diet and life style modifications, as counseled. Prior results reviewed, interpreted and discussed with the patient during today's examination, as appropriate. Follow up, treatment plan and tests, as ordered.\par \par EKG:\par Sinus Bradycardia @ 55 BPM.\par No new ST-T changes noted.

## 2022-11-07 NOTE — HISTORY OF PRESENT ILLNESS
[de-identified] : 72 year old male patient with history of stable Hypertension, Hypercholesterolemia, Hyperkalemia, Elevated PTH, history as stated, presented for an annual preventative examination.\par Patient denies any associated symptoms of shortness of breath, chest pain, abdominal pain at this time.\par

## 2022-11-08 LAB
25(OH)D3 SERPL-MCNC: 79.1 NG/ML
ALBUMIN SERPL ELPH-MCNC: 4.5 G/DL
ALP BLD-CCNC: 68 U/L
ALT SERPL-CCNC: 14 U/L
ANION GAP SERPL CALC-SCNC: 15 MMOL/L
APPEARANCE: CLEAR
AST SERPL-CCNC: 20 U/L
BACTERIA: NEGATIVE
BASOPHILS # BLD AUTO: 0.04 K/UL
BASOPHILS NFR BLD AUTO: 0.7 %
BILIRUB SERPL-MCNC: 0.4 MG/DL
BILIRUBIN URINE: NEGATIVE
BLOOD URINE: NEGATIVE
BUN SERPL-MCNC: 23 MG/DL
CALCIUM SERPL-MCNC: 9.7 MG/DL
CALCIUM SERPL-MCNC: 9.7 MG/DL
CHLORIDE SERPL-SCNC: 105 MMOL/L
CHOLEST SERPL-MCNC: 196 MG/DL
CO2 SERPL-SCNC: 22 MMOL/L
COLOR: YELLOW
CREAT SERPL-MCNC: 1.18 MG/DL
CREAT SPEC-SCNC: 90 MG/DL
EGFR: 66 ML/MIN/1.73M2
EOSINOPHIL # BLD AUTO: 0.05 K/UL
EOSINOPHIL NFR BLD AUTO: 0.8 %
ESTIMATED AVERAGE GLUCOSE: 108 MG/DL
GGT SERPL-CCNC: 14 U/L
GLUCOSE QUALITATIVE U: NEGATIVE
GLUCOSE SERPL-MCNC: 88 MG/DL
HBA1C MFR BLD HPLC: 5.4 %
HCT VFR BLD CALC: 47.7 %
HDLC SERPL-MCNC: 52 MG/DL
HGB BLD-MCNC: 15.1 G/DL
HYALINE CASTS: 3 /LPF
IMM GRANULOCYTES NFR BLD AUTO: 0.5 %
KETONES URINE: NEGATIVE
LDLC SERPL CALC-MCNC: 130 MG/DL
LEUKOCYTE ESTERASE URINE: NEGATIVE
LYMPHOCYTES # BLD AUTO: 1.03 K/UL
LYMPHOCYTES NFR BLD AUTO: 17.3 %
MAN DIFF?: NORMAL
MCHC RBC-ENTMCNC: 29.7 PG
MCHC RBC-ENTMCNC: 31.7 GM/DL
MCV RBC AUTO: 93.7 FL
MICROALBUMIN 24H UR DL<=1MG/L-MCNC: 1.7 MG/DL
MICROALBUMIN/CREAT 24H UR-RTO: 19 MG/G
MICROSCOPIC-UA: NORMAL
MONOCYTES # BLD AUTO: 0.47 K/UL
MONOCYTES NFR BLD AUTO: 7.9 %
NEUTROPHILS # BLD AUTO: 4.35 K/UL
NEUTROPHILS NFR BLD AUTO: 72.8 %
NITRITE URINE: NEGATIVE
NONHDLC SERPL-MCNC: 144 MG/DL
PARATHYROID HORMONE INTACT: 75 PG/ML
PH URINE: 6.5
PHOSPHATE SERPL-MCNC: 3.6 MG/DL
PLATELET # BLD AUTO: 218 K/UL
POTASSIUM SERPL-SCNC: 4.6 MMOL/L
PROT SERPL-MCNC: 6.7 G/DL
PROTEIN URINE: NEGATIVE
PSA FREE FLD-MCNC: 32 %
PSA FREE SERPL-MCNC: 0.41 NG/ML
PSA SERPL-MCNC: 1.3 NG/ML
RBC # BLD: 5.09 M/UL
RBC # FLD: 13.8 %
RED BLOOD CELLS URINE: 2 /HPF
SODIUM SERPL-SCNC: 142 MMOL/L
SPECIFIC GRAVITY URINE: 1.01
SQUAMOUS EPITHELIAL CELLS: 3 /HPF
TRIGL SERPL-MCNC: 70 MG/DL
TSH SERPL-ACNC: 2.19 UIU/ML
UROBILINOGEN URINE: NORMAL
WBC # FLD AUTO: 5.97 K/UL
WHITE BLOOD CELLS URINE: 1 /HPF

## 2022-11-13 LAB — HEMOCCULT STL QL IA: NEGATIVE

## 2023-02-24 RX ORDER — AMLODIPINE BESYLATE 10 MG/1
10 TABLET ORAL DAILY
Qty: 90 | Refills: 3 | Status: ACTIVE | COMMUNITY
Start: 2017-07-26 | End: 1900-01-01

## 2023-04-24 ENCOUNTER — APPOINTMENT (OUTPATIENT)
Dept: INTERNAL MEDICINE | Facility: CLINIC | Age: 73
End: 2023-04-24
Payer: MEDICARE

## 2023-04-24 VITALS
BODY MASS INDEX: 21.05 KG/M2 | WEIGHT: 147 LBS | HEIGHT: 70 IN | RESPIRATION RATE: 16 BRPM | HEART RATE: 57 BPM | OXYGEN SATURATION: 96 % | DIASTOLIC BLOOD PRESSURE: 75 MMHG | TEMPERATURE: 98 F | SYSTOLIC BLOOD PRESSURE: 156 MMHG

## 2023-04-24 PROCEDURE — 99214 OFFICE O/P EST MOD 30 MIN: CPT | Mod: 25

## 2023-04-24 PROCEDURE — 36415 COLL VENOUS BLD VENIPUNCTURE: CPT

## 2023-04-24 NOTE — HISTORY OF PRESENT ILLNESS
[de-identified] : 72 year old  male patient with history of stable Hypertension, Hypercholesterolemia, Hyperkalemia, Elevated PTH, Lumbar Radiculopathy, Osteopenia, Microalbuminuria, history as stated, presented for follow up examination. Patient is compliant with all medications. ROS as stated.\par

## 2023-04-24 NOTE — HEALTH RISK ASSESSMENT
[Intercurrent hospitalizations] : was admitted to the hospital  [No] : In the past 12 months have you used drugs other than those required for medical reasons? No [Any fall with injury in past year] : Patient reported fall with injury in the past year [Assistive Device] : Patient uses an assistive device [0] : 2) Feeling down, depressed, or hopeless: Not at all (0) [Never] : Never [de-identified] : None [de-identified] : 05/21 [de-identified] : Cane [[id number]] : 0

## 2023-04-24 NOTE — ASSESSMENT
[FreeTextEntry1] : 72 year old male found to have stable Hypertension, Hypercholesterolemia, Hyperkalemia, Elevated PTH, Lumbar Radiculopathy, Osteopenia, Microalbuminuria, with the current prescription regimen as recommended, diet and life style modifications, as counseled. Prior results reviewed, interpreted and discussed with the patient during today's examination, as appropriate. Follow up, treatment plan and tests, as ordered.\par \par Total time spent : 30 minutes\par Including:\par Preparation prior to visit - Reviewing prior record, results of tests and Consultation Reports as applicable\par Conducting an appropriate H & P during today's encounter\par Appropriate orders for tests, medications and procedures, as applicable\par Counseling patient \par Note completion\par

## 2023-04-26 LAB
ALBUMIN SERPL ELPH-MCNC: 4.4 G/DL
ALP BLD-CCNC: 66 U/L
ALT SERPL-CCNC: 18 U/L
ANION GAP SERPL CALC-SCNC: 12 MMOL/L
AST SERPL-CCNC: 25 U/L
BASOPHILS # BLD AUTO: 0.04 K/UL
BASOPHILS NFR BLD AUTO: 0.6 %
BILIRUB SERPL-MCNC: 0.3 MG/DL
BUN SERPL-MCNC: 22 MG/DL
CALCIUM SERPL-MCNC: 9.7 MG/DL
CALCIUM SERPL-MCNC: 9.7 MG/DL
CHLORIDE SERPL-SCNC: 104 MMOL/L
CHOLEST SERPL-MCNC: 202 MG/DL
CO2 SERPL-SCNC: 25 MMOL/L
CREAT SERPL-MCNC: 1.21 MG/DL
EGFR: 64 ML/MIN/1.73M2
EOSINOPHIL # BLD AUTO: 0.06 K/UL
EOSINOPHIL NFR BLD AUTO: 0.9 %
ESTIMATED AVERAGE GLUCOSE: 105 MG/DL
GGT SERPL-CCNC: 12 U/L
GLUCOSE SERPL-MCNC: 87 MG/DL
HBA1C MFR BLD HPLC: 5.3 %
HCT VFR BLD CALC: 47.8 %
HDLC SERPL-MCNC: 48 MG/DL
HGB BLD-MCNC: 15 G/DL
IMM GRANULOCYTES NFR BLD AUTO: 0.5 %
LDLC SERPL CALC-MCNC: 125 MG/DL
LYMPHOCYTES # BLD AUTO: 0.78 K/UL
LYMPHOCYTES NFR BLD AUTO: 12.3 %
MAN DIFF?: NORMAL
MCHC RBC-ENTMCNC: 29.9 PG
MCHC RBC-ENTMCNC: 31.4 GM/DL
MCV RBC AUTO: 95.2 FL
MONOCYTES # BLD AUTO: 0.63 K/UL
MONOCYTES NFR BLD AUTO: 10 %
NEUTROPHILS # BLD AUTO: 4.78 K/UL
NEUTROPHILS NFR BLD AUTO: 75.7 %
NONHDLC SERPL-MCNC: 154 MG/DL
PARATHYROID HORMONE INTACT: 73 PG/ML
PLATELET # BLD AUTO: 213 K/UL
POTASSIUM SERPL-SCNC: 4.8 MMOL/L
PROT SERPL-MCNC: 6.5 G/DL
RBC # BLD: 5.02 M/UL
RBC # FLD: 14.2 %
SODIUM SERPL-SCNC: 140 MMOL/L
TRIGL SERPL-MCNC: 144 MG/DL
WBC # FLD AUTO: 6.32 K/UL

## 2023-04-28 ENCOUNTER — NON-APPOINTMENT (OUTPATIENT)
Age: 73
End: 2023-04-28

## 2023-10-09 ENCOUNTER — APPOINTMENT (OUTPATIENT)
Dept: INTERNAL MEDICINE | Facility: CLINIC | Age: 73
End: 2023-10-09
Payer: MEDICARE

## 2023-10-09 VITALS
SYSTOLIC BLOOD PRESSURE: 139 MMHG | WEIGHT: 144 LBS | DIASTOLIC BLOOD PRESSURE: 70 MMHG | RESPIRATION RATE: 16 BRPM | HEIGHT: 70 IN | BODY MASS INDEX: 20.62 KG/M2 | TEMPERATURE: 97.6 F | HEART RATE: 64 BPM | OXYGEN SATURATION: 95 %

## 2023-10-09 PROCEDURE — 99214 OFFICE O/P EST MOD 30 MIN: CPT

## 2023-10-12 ENCOUNTER — APPOINTMENT (OUTPATIENT)
Dept: INTERNAL MEDICINE | Facility: CLINIC | Age: 73
End: 2023-10-12
Payer: MEDICARE

## 2023-10-12 DIAGNOSIS — R79.89 OTHER SPECIFIED ABNORMAL FINDINGS OF BLOOD CHEMISTRY: ICD-10-CM

## 2023-10-12 PROCEDURE — 36415 COLL VENOUS BLD VENIPUNCTURE: CPT

## 2023-10-14 LAB
25(OH)D3 SERPL-MCNC: 68.1 NG/ML
ALBUMIN SERPL ELPH-MCNC: 4.5 G/DL
ALP BLD-CCNC: 64 U/L
ALT SERPL-CCNC: 14 U/L
ANION GAP SERPL CALC-SCNC: 14 MMOL/L
APPEARANCE: CLEAR
AST SERPL-CCNC: 19 U/L
BACTERIA: NEGATIVE /HPF
BASOPHILS # BLD AUTO: 0.04 K/UL
BASOPHILS NFR BLD AUTO: 0.6 %
BILIRUB SERPL-MCNC: 0.4 MG/DL
BILIRUBIN URINE: NEGATIVE
BLOOD URINE: NEGATIVE
BUN SERPL-MCNC: 32 MG/DL
CALCIUM SERPL-MCNC: 9.9 MG/DL
CALCIUM SERPL-MCNC: 9.9 MG/DL
CAST: 0 /LPF
CHLORIDE SERPL-SCNC: 104 MMOL/L
CHOLEST SERPL-MCNC: 216 MG/DL
CO2 SERPL-SCNC: 24 MMOL/L
COLOR: YELLOW
CREAT SERPL-MCNC: 1.24 MG/DL
CREAT SPEC-SCNC: 111 MG/DL
EGFR: 61 ML/MIN/1.73M2
EOSINOPHIL # BLD AUTO: 0.14 K/UL
EOSINOPHIL NFR BLD AUTO: 2.2 %
EPITHELIAL CELLS: 3 /HPF
ESTIMATED AVERAGE GLUCOSE: 111 MG/DL
GGT SERPL-CCNC: 13 U/L
GLUCOSE QUALITATIVE U: NEGATIVE MG/DL
GLUCOSE SERPL-MCNC: 102 MG/DL
HBA1C MFR BLD HPLC: 5.5 %
HCT VFR BLD CALC: 47 %
HDLC SERPL-MCNC: 50 MG/DL
HEMOCCULT STL QL IA: NEGATIVE
HGB BLD-MCNC: 15.3 G/DL
IMM GRANULOCYTES NFR BLD AUTO: 0.3 %
KETONES URINE: NEGATIVE MG/DL
LDLC SERPL CALC-MCNC: 158 MG/DL
LEUKOCYTE ESTERASE URINE: ABNORMAL
LYMPHOCYTES # BLD AUTO: 0.98 K/UL
LYMPHOCYTES NFR BLD AUTO: 15.2 %
MAN DIFF?: NORMAL
MCHC RBC-ENTMCNC: 29.8 PG
MCHC RBC-ENTMCNC: 32.6 GM/DL
MCV RBC AUTO: 91.4 FL
MICROALBUMIN 24H UR DL<=1MG/L-MCNC: 2 MG/DL
MICROALBUMIN/CREAT 24H UR-RTO: 18 MG/G
MICROSCOPIC-UA: NORMAL
MONOCYTES # BLD AUTO: 0.55 K/UL
MONOCYTES NFR BLD AUTO: 8.6 %
NEUTROPHILS # BLD AUTO: 4.7 K/UL
NEUTROPHILS NFR BLD AUTO: 73.1 %
NITRITE URINE: NEGATIVE
NONHDLC SERPL-MCNC: 167 MG/DL
PARATHYROID HORMONE INTACT: 73 PG/ML
PH URINE: 5.5
PLATELET # BLD AUTO: 234 K/UL
POTASSIUM SERPL-SCNC: 4.6 MMOL/L
PROT SERPL-MCNC: 7 G/DL
PROTEIN URINE: NEGATIVE MG/DL
PSA FREE FLD-MCNC: 29 %
PSA FREE SERPL-MCNC: 0.54 NG/ML
PSA SERPL-MCNC: 1.87 NG/ML
RBC # BLD: 5.14 M/UL
RBC # FLD: 14 %
RED BLOOD CELLS URINE: 1 /HPF
SODIUM SERPL-SCNC: 142 MMOL/L
SPECIFIC GRAVITY URINE: 1.02
TRIGL SERPL-MCNC: 51 MG/DL
TSH SERPL-ACNC: 1.79 UIU/ML
UROBILINOGEN URINE: 0.2 MG/DL
WBC # FLD AUTO: 6.43 K/UL
WHITE BLOOD CELLS URINE: 8 /HPF

## 2023-10-17 ENCOUNTER — NON-APPOINTMENT (OUTPATIENT)
Age: 73
End: 2023-10-17

## 2023-11-14 NOTE — ED PROVIDER NOTE - NS ED ROS FT
CONSTITUTIONAL: No fevers, no chills  Eyes: no visual changes  Ears: no ear drainage, no ear pain  Nose: no nasal congestion  Mouth/Throat: no sore throat  Cardiovascular: No Chest pain  Respiratory: No SOB  Gastrointestinal: No n/v/d, no abd pain  Genitourinary: no dysuria, no hematuria  MSK: neck pain   SKIN: no rashes.  NEURO: no headache  PSYCHIATRIC: no known mental health issues. done

## 2024-05-13 ENCOUNTER — APPOINTMENT (OUTPATIENT)
Dept: INTERNAL MEDICINE | Facility: CLINIC | Age: 74
End: 2024-05-13
Payer: MEDICARE

## 2024-05-13 ENCOUNTER — NON-APPOINTMENT (OUTPATIENT)
Age: 74
End: 2024-05-13

## 2024-05-13 VITALS
OXYGEN SATURATION: 96 % | RESPIRATION RATE: 16 BRPM | TEMPERATURE: 98 F | BODY MASS INDEX: 20.04 KG/M2 | WEIGHT: 140 LBS | HEART RATE: 57 BPM | DIASTOLIC BLOOD PRESSURE: 80 MMHG | SYSTOLIC BLOOD PRESSURE: 160 MMHG | HEIGHT: 70 IN

## 2024-05-13 DIAGNOSIS — M54.16 RADICULOPATHY, LUMBAR REGION: ICD-10-CM

## 2024-05-13 DIAGNOSIS — M85.80 OTHER SPECIFIED DISORDERS OF BONE DENSITY AND STRUCTURE, UNSPECIFIED SITE: ICD-10-CM

## 2024-05-13 DIAGNOSIS — E78.00 PURE HYPERCHOLESTEROLEMIA, UNSPECIFIED: ICD-10-CM

## 2024-05-13 DIAGNOSIS — E87.5 HYPERKALEMIA: ICD-10-CM

## 2024-05-13 DIAGNOSIS — R80.9 PROTEINURIA, UNSPECIFIED: ICD-10-CM

## 2024-05-13 DIAGNOSIS — I10 ESSENTIAL (PRIMARY) HYPERTENSION: ICD-10-CM

## 2024-05-13 PROCEDURE — 93000 ELECTROCARDIOGRAM COMPLETE: CPT

## 2024-05-13 PROCEDURE — 36415 COLL VENOUS BLD VENIPUNCTURE: CPT

## 2024-05-13 PROCEDURE — 99214 OFFICE O/P EST MOD 30 MIN: CPT | Mod: 25

## 2024-05-13 NOTE — HEALTH RISK ASSESSMENT
[No] : In the past 12 months have you used drugs other than those required for medical reasons? No [Any fall with injury in past year] : Patient reported fall with injury in the past year [Assistive Device] : Patient uses an assistive device [0] : 2) Feeling down, depressed, or hopeless: Not at all (0) [Never] : Never [Independent] : managing finances [Some assistance needed] : using transportation [de-identified] : None [de-identified] : Cane [[id number]] : 0

## 2024-05-13 NOTE — REVIEW OF SYSTEMS
[Unsteady Walk] : ataxia [Muscle Pain] : muscle pain [Back Pain] : back pain [TextEntry] : CARDIOVASCULAR: Negative RESPIRATORY: Negative GASTROINTESTINAL: Negative

## 2024-05-13 NOTE — HISTORY OF PRESENT ILLNESS
[de-identified] : 73 year old  male patient with history of stable Hypertension, Hypercholesterolemia, Hyperkalemia, Elevated PTH, Lumbar Radiculopathy, Osteopenia, Microalbuminuria, history as stated, presented for follow up examination. Patient is compliant with all medications. ROS as stated.

## 2024-05-13 NOTE — PHYSICAL EXAM
[TextEntry] : PULMONARY:  No respiratory distress and lungs were clear to auscultation bilaterally. HEART:  Heart rate was normal and rhythm regular, normal S1 and S2, no gallops/murmur/pericardial rub. VASCULAR:  No peripheral edema. ABDOMEN:  Normal bowel sounds, soft, non-tender, no hepatosplenomegaly and no abdominal mass palpated. NEUROLOGICAL: Poor balance and coordination noted, no focal deficits.

## 2024-05-13 NOTE — ASSESSMENT
[FreeTextEntry1] : 73 year old male found to have stable Hypertension, Hypercholesterolemia, Hyperkalemia, Elevated PTH, Lumbar Radiculopathy, Osteopenia, Microalbuminuria, with the current prescription regimen as recommended, diet and life style modifications, as counseled. Prior results reviewed, interpreted and discussed with the patient during today's examination, as appropriate. Follow up, treatment plan and tests, as ordered.  Discussed with Neurologist during today's examination, patient qualifies per PPDP, as counseled.  EKG: Sinus Bradycardia @ 59 BPM. No new acute ST-T changes noted.  Total time spent : 30 minutes Including: Preparation prior to visit - Reviewing prior record, results of tests and Consultation Reports as applicable Conducting an appropriate H & P during today's encounter Appropriate orders for tests, medications and procedures, as applicable Counseling patient  Note completion

## 2024-05-14 LAB
ALBUMIN SERPL ELPH-MCNC: 4.6 G/DL
ALP BLD-CCNC: 64 U/L
ALT SERPL-CCNC: 14 U/L
ANION GAP SERPL CALC-SCNC: 13 MMOL/L
AST SERPL-CCNC: 23 U/L
BASOPHILS # BLD AUTO: 0.03 K/UL
BASOPHILS NFR BLD AUTO: 0.5 %
BILIRUB SERPL-MCNC: 0.4 MG/DL
BUN SERPL-MCNC: 24 MG/DL
CALCIUM SERPL-MCNC: 9.6 MG/DL
CALCIUM SERPL-MCNC: 9.6 MG/DL
CHLORIDE SERPL-SCNC: 107 MMOL/L
CHOLEST SERPL-MCNC: 176 MG/DL
CO2 SERPL-SCNC: 24 MMOL/L
CREAT SERPL-MCNC: 1.15 MG/DL
EGFR: 67 ML/MIN/1.73M2
EOSINOPHIL # BLD AUTO: 0.09 K/UL
EOSINOPHIL NFR BLD AUTO: 1.6 %
ESTIMATED AVERAGE GLUCOSE: 105 MG/DL
GGT SERPL-CCNC: 12 U/L
GLUCOSE SERPL-MCNC: 101 MG/DL
HBA1C MFR BLD HPLC: 5.3 %
HCT VFR BLD CALC: 45.2 %
HDLC SERPL-MCNC: 52 MG/DL
HGB BLD-MCNC: 14.6 G/DL
IMM GRANULOCYTES NFR BLD AUTO: 0.7 %
LDLC SERPL CALC-MCNC: 114 MG/DL
LYMPHOCYTES # BLD AUTO: 1.11 K/UL
LYMPHOCYTES NFR BLD AUTO: 19.9 %
MAN DIFF?: NORMAL
MCHC RBC-ENTMCNC: 29.9 PG
MCHC RBC-ENTMCNC: 32.3 GM/DL
MCV RBC AUTO: 92.6 FL
MONOCYTES # BLD AUTO: 0.54 K/UL
MONOCYTES NFR BLD AUTO: 9.7 %
NEUTROPHILS # BLD AUTO: 3.76 K/UL
NEUTROPHILS NFR BLD AUTO: 67.6 %
NONHDLC SERPL-MCNC: 123 MG/DL
PARATHYROID HORMONE INTACT: 74 PG/ML
PLATELET # BLD AUTO: 194 K/UL
POTASSIUM SERPL-SCNC: 4.3 MMOL/L
PROT SERPL-MCNC: 6.8 G/DL
RBC # BLD: 4.88 M/UL
RBC # FLD: 14.2 %
SODIUM SERPL-SCNC: 144 MMOL/L
TRIGL SERPL-MCNC: 48 MG/DL
WBC # FLD AUTO: 5.57 K/UL

## 2024-08-06 ENCOUNTER — RX RENEWAL (OUTPATIENT)
Age: 74
End: 2024-08-06

## 2024-11-18 ENCOUNTER — APPOINTMENT (OUTPATIENT)
Dept: INTERNAL MEDICINE | Facility: CLINIC | Age: 74
End: 2024-11-18
Payer: MEDICARE

## 2024-11-18 VITALS
WEIGHT: 140 LBS | RESPIRATION RATE: 16 BRPM | OXYGEN SATURATION: 96 % | DIASTOLIC BLOOD PRESSURE: 80 MMHG | HEIGHT: 70 IN | BODY MASS INDEX: 20.04 KG/M2 | HEART RATE: 59 BPM | TEMPERATURE: 97.6 F | SYSTOLIC BLOOD PRESSURE: 156 MMHG

## 2024-11-18 DIAGNOSIS — R80.9 PROTEINURIA, UNSPECIFIED: ICD-10-CM

## 2024-11-18 DIAGNOSIS — M54.16 RADICULOPATHY, LUMBAR REGION: ICD-10-CM

## 2024-11-18 DIAGNOSIS — M85.80 OTHER SPECIFIED DISORDERS OF BONE DENSITY AND STRUCTURE, UNSPECIFIED SITE: ICD-10-CM

## 2024-11-18 DIAGNOSIS — R79.89 OTHER SPECIFIED ABNORMAL FINDINGS OF BLOOD CHEMISTRY: ICD-10-CM

## 2024-11-18 DIAGNOSIS — I10 ESSENTIAL (PRIMARY) HYPERTENSION: ICD-10-CM

## 2024-11-18 DIAGNOSIS — E78.00 PURE HYPERCHOLESTEROLEMIA, UNSPECIFIED: ICD-10-CM

## 2024-11-18 PROCEDURE — 36415 COLL VENOUS BLD VENIPUNCTURE: CPT

## 2024-11-18 PROCEDURE — 99214 OFFICE O/P EST MOD 30 MIN: CPT

## 2024-11-19 LAB
25(OH)D3 SERPL-MCNC: 75.1 NG/ML
ALBUMIN SERPL ELPH-MCNC: 4.4 G/DL
ALP BLD-CCNC: 60 U/L
ALT SERPL-CCNC: 11 U/L
ANION GAP SERPL CALC-SCNC: 10 MMOL/L
APPEARANCE: CLEAR
AST SERPL-CCNC: 19 U/L
BACTERIA: NEGATIVE /HPF
BASOPHILS # BLD AUTO: 0.03 K/UL
BASOPHILS NFR BLD AUTO: 0.5 %
BILIRUB SERPL-MCNC: 0.5 MG/DL
BILIRUBIN URINE: NEGATIVE
BLOOD URINE: NEGATIVE
BUN SERPL-MCNC: 23 MG/DL
CALCIUM SERPL-MCNC: 9.7 MG/DL
CALCIUM SERPL-MCNC: 9.7 MG/DL
CAST: 2 /LPF
CHLORIDE SERPL-SCNC: 103 MMOL/L
CHOLEST SERPL-MCNC: 198 MG/DL
CO2 SERPL-SCNC: 27 MMOL/L
COLOR: YELLOW
CREAT SERPL-MCNC: 1.11 MG/DL
CREAT SPEC-SCNC: 108 MG/DL
EGFR: 70 ML/MIN/1.73M2
EOSINOPHIL # BLD AUTO: 0.12 K/UL
EOSINOPHIL NFR BLD AUTO: 2 %
EPITHELIAL CELLS: 3 /HPF
ESTIMATED AVERAGE GLUCOSE: 105 MG/DL
GGT SERPL-CCNC: 12 U/L
GLUCOSE QUALITATIVE U: NEGATIVE MG/DL
GLUCOSE SERPL-MCNC: 94 MG/DL
HBA1C MFR BLD HPLC: 5.3 %
HCT VFR BLD CALC: 45.1 %
HDLC SERPL-MCNC: 56 MG/DL
HGB BLD-MCNC: 14.7 G/DL
IMM GRANULOCYTES NFR BLD AUTO: 0.3 %
KETONES URINE: NEGATIVE MG/DL
LDLC SERPL CALC-MCNC: 130 MG/DL
LEUKOCYTE ESTERASE URINE: ABNORMAL
LYMPHOCYTES # BLD AUTO: 1.02 K/UL
LYMPHOCYTES NFR BLD AUTO: 16.9 %
MAN DIFF?: NORMAL
MCHC RBC-ENTMCNC: 29.9 PG
MCHC RBC-ENTMCNC: 32.6 G/DL
MCV RBC AUTO: 91.9 FL
MICROALBUMIN 24H UR DL<=1MG/L-MCNC: 3 MG/DL
MICROALBUMIN/CREAT 24H UR-RTO: 28 MG/G
MICROSCOPIC-UA: NORMAL
MONOCYTES # BLD AUTO: 0.57 K/UL
MONOCYTES NFR BLD AUTO: 9.4 %
NEUTROPHILS # BLD AUTO: 4.28 K/UL
NEUTROPHILS NFR BLD AUTO: 70.9 %
NITRITE URINE: NEGATIVE
NONHDLC SERPL-MCNC: 141 MG/DL
PARATHYROID HORMONE INTACT: 65 PG/ML
PH URINE: 5.5
PLATELET # BLD AUTO: 221 K/UL
POTASSIUM SERPL-SCNC: 4.5 MMOL/L
PROT SERPL-MCNC: 6.6 G/DL
PROTEIN URINE: NEGATIVE MG/DL
PSA FREE FLD-MCNC: 36 %
PSA FREE SERPL-MCNC: 0.39 NG/ML
PSA SERPL-MCNC: 1.08 NG/ML
RBC # BLD: 4.91 M/UL
RBC # FLD: 14.5 %
RED BLOOD CELLS URINE: 3 /HPF
SODIUM SERPL-SCNC: 139 MMOL/L
SPECIFIC GRAVITY URINE: 1.02
TRIGL SERPL-MCNC: 60 MG/DL
TSH SERPL-ACNC: 2.51 UIU/ML
UROBILINOGEN URINE: 0.2 MG/DL
WBC # FLD AUTO: 6.04 K/UL
WHITE BLOOD CELLS URINE: 3 /HPF

## 2024-11-21 LAB — HEMOCCULT STL QL IA: NEGATIVE

## 2024-12-24 PROBLEM — F10.90 ALCOHOL USE: Status: INACTIVE | Noted: 2018-03-12

## 2025-05-12 ENCOUNTER — NON-APPOINTMENT (OUTPATIENT)
Age: 75
End: 2025-05-12

## 2025-05-12 ENCOUNTER — APPOINTMENT (OUTPATIENT)
Dept: INTERNAL MEDICINE | Facility: CLINIC | Age: 75
End: 2025-05-12
Payer: MEDICARE

## 2025-05-12 VITALS
SYSTOLIC BLOOD PRESSURE: 148 MMHG | TEMPERATURE: 97.3 F | HEART RATE: 61 BPM | OXYGEN SATURATION: 96 % | DIASTOLIC BLOOD PRESSURE: 68 MMHG | HEIGHT: 70 IN | WEIGHT: 137 LBS | BODY MASS INDEX: 19.61 KG/M2 | RESPIRATION RATE: 16 BRPM

## 2025-05-12 DIAGNOSIS — E87.5 HYPERKALEMIA: ICD-10-CM

## 2025-05-12 DIAGNOSIS — M54.16 RADICULOPATHY, LUMBAR REGION: ICD-10-CM

## 2025-05-12 DIAGNOSIS — I10 ESSENTIAL (PRIMARY) HYPERTENSION: ICD-10-CM

## 2025-05-12 DIAGNOSIS — M85.80 OTHER SPECIFIED DISORDERS OF BONE DENSITY AND STRUCTURE, UNSPECIFIED SITE: ICD-10-CM

## 2025-05-12 DIAGNOSIS — R79.89 OTHER SPECIFIED ABNORMAL FINDINGS OF BLOOD CHEMISTRY: ICD-10-CM

## 2025-05-12 DIAGNOSIS — E78.00 PURE HYPERCHOLESTEROLEMIA, UNSPECIFIED: ICD-10-CM

## 2025-05-12 PROCEDURE — 93000 ELECTROCARDIOGRAM COMPLETE: CPT

## 2025-05-12 PROCEDURE — 36415 COLL VENOUS BLD VENIPUNCTURE: CPT

## 2025-05-12 PROCEDURE — 99214 OFFICE O/P EST MOD 30 MIN: CPT

## 2025-05-13 LAB
ALBUMIN SERPL ELPH-MCNC: 4 G/DL
ALP BLD-CCNC: 62 U/L
ALT SERPL-CCNC: 13 U/L
ANION GAP SERPL CALC-SCNC: 16 MMOL/L
AST SERPL-CCNC: 17 U/L
BASOPHILS # BLD AUTO: 0.04 K/UL
BASOPHILS NFR BLD AUTO: 0.5 %
BILIRUB SERPL-MCNC: 0.4 MG/DL
BUN SERPL-MCNC: 26 MG/DL
CALCIUM SERPL-MCNC: 9.2 MG/DL
CALCIUM SERPL-MCNC: 9.2 MG/DL
CHLORIDE SERPL-SCNC: 107 MMOL/L
CHOLEST SERPL-MCNC: 166 MG/DL
CO2 SERPL-SCNC: 22 MMOL/L
CREAT SERPL-MCNC: 1.01 MG/DL
EGFRCR SERPLBLD CKD-EPI 2021: 78 ML/MIN/1.73M2
EOSINOPHIL # BLD AUTO: 0.09 K/UL
EOSINOPHIL NFR BLD AUTO: 1.2 %
GGT SERPL-CCNC: 11 U/L
GLUCOSE SERPL-MCNC: 99 MG/DL
HCT VFR BLD CALC: 40.9 %
HDLC SERPL-MCNC: 47 MG/DL
HGB BLD-MCNC: 13.3 G/DL
IMM GRANULOCYTES NFR BLD AUTO: 0.8 %
LDLC SERPL-MCNC: 109 MG/DL
LYMPHOCYTES # BLD AUTO: 1.16 K/UL
LYMPHOCYTES NFR BLD AUTO: 15.3 %
MAN DIFF?: NORMAL
MCHC RBC-ENTMCNC: 30 PG
MCHC RBC-ENTMCNC: 32.5 G/DL
MCV RBC AUTO: 92.1 FL
MONOCYTES # BLD AUTO: 0.78 K/UL
MONOCYTES NFR BLD AUTO: 10.3 %
NEUTROPHILS # BLD AUTO: 5.47 K/UL
NEUTROPHILS NFR BLD AUTO: 71.9 %
NONHDLC SERPL-MCNC: 119 MG/DL
PARATHYROID HORMONE INTACT: 45 PG/ML
PLATELET # BLD AUTO: 217 K/UL
POTASSIUM SERPL-SCNC: 4.2 MMOL/L
PROT SERPL-MCNC: 6.5 G/DL
RBC # BLD: 4.44 M/UL
RBC # FLD: 14.3 %
SODIUM SERPL-SCNC: 145 MMOL/L
TRIGL SERPL-MCNC: 50 MG/DL
WBC # FLD AUTO: 7.6 K/UL

## 2025-05-17 LAB
ESTIMATED AVERAGE GLUCOSE: 105 MG/DL
HBA1C MFR BLD HPLC: 5.3 %

## 2025-06-12 ENCOUNTER — APPOINTMENT (OUTPATIENT)
Dept: ULTRASOUND IMAGING | Facility: CLINIC | Age: 75
End: 2025-06-12
Payer: MEDICARE

## 2025-06-12 ENCOUNTER — INPATIENT (INPATIENT)
Facility: HOSPITAL | Age: 75
LOS: 18 days | Discharge: HOME HEALTH | End: 2025-07-01
Attending: INTERNAL MEDICINE | Admitting: INTERNAL MEDICINE
Payer: MEDICARE

## 2025-06-12 VITALS
WEIGHT: 134.92 LBS | SYSTOLIC BLOOD PRESSURE: 149 MMHG | OXYGEN SATURATION: 97 % | HEART RATE: 76 BPM | DIASTOLIC BLOOD PRESSURE: 86 MMHG | TEMPERATURE: 98 F | RESPIRATION RATE: 17 BRPM

## 2025-06-12 LAB
ALBUMIN SERPL ELPH-MCNC: 4.1 G/DL — SIGNIFICANT CHANGE UP (ref 3.3–5)
ALP SERPL-CCNC: 71 U/L — SIGNIFICANT CHANGE UP (ref 40–120)
ALT FLD-CCNC: 9 U/L — SIGNIFICANT CHANGE UP (ref 4–41)
ANION GAP SERPL CALC-SCNC: 12 MMOL/L — SIGNIFICANT CHANGE UP (ref 7–14)
APTT BLD: 28.9 SEC — SIGNIFICANT CHANGE UP (ref 26.1–36.8)
AST SERPL-CCNC: 15 U/L — SIGNIFICANT CHANGE UP (ref 4–40)
BASOPHILS # BLD AUTO: 0.05 K/UL — SIGNIFICANT CHANGE UP (ref 0–0.2)
BASOPHILS NFR BLD AUTO: 0.6 % — SIGNIFICANT CHANGE UP (ref 0–2)
BILIRUB SERPL-MCNC: 0.3 MG/DL — SIGNIFICANT CHANGE UP (ref 0.2–1.2)
BUN SERPL-MCNC: 28 MG/DL — HIGH (ref 7–23)
CALCIUM SERPL-MCNC: 9.9 MG/DL — SIGNIFICANT CHANGE UP (ref 8.4–10.5)
CHLORIDE SERPL-SCNC: 106 MMOL/L — SIGNIFICANT CHANGE UP (ref 98–107)
CO2 SERPL-SCNC: 25 MMOL/L — SIGNIFICANT CHANGE UP (ref 22–31)
CREAT SERPL-MCNC: 1.33 MG/DL — HIGH (ref 0.5–1.3)
EGFR: 56 ML/MIN/1.73M2 — LOW
EGFR: 56 ML/MIN/1.73M2 — LOW
EOSINOPHIL # BLD AUTO: 0.09 K/UL — SIGNIFICANT CHANGE UP (ref 0–0.5)
EOSINOPHIL NFR BLD AUTO: 1.2 % — SIGNIFICANT CHANGE UP (ref 0–6)
GLUCOSE SERPL-MCNC: 100 MG/DL — HIGH (ref 70–99)
HCT VFR BLD CALC: 43.3 % — SIGNIFICANT CHANGE UP (ref 39–50)
HGB BLD-MCNC: 14.2 G/DL — SIGNIFICANT CHANGE UP (ref 13–17)
IANC: 5.82 K/UL — SIGNIFICANT CHANGE UP (ref 1.8–7.4)
IMM GRANULOCYTES NFR BLD AUTO: 0.8 % — SIGNIFICANT CHANGE UP (ref 0–0.9)
INR BLD: 1.02 RATIO — SIGNIFICANT CHANGE UP (ref 0.85–1.16)
LYMPHOCYTES # BLD AUTO: 0.97 K/UL — LOW (ref 1–3.3)
LYMPHOCYTES # BLD AUTO: 12.5 % — LOW (ref 13–44)
MCHC RBC-ENTMCNC: 29.3 PG — SIGNIFICANT CHANGE UP (ref 27–34)
MCHC RBC-ENTMCNC: 32.8 G/DL — SIGNIFICANT CHANGE UP (ref 32–36)
MCV RBC AUTO: 89.5 FL — SIGNIFICANT CHANGE UP (ref 80–100)
MONOCYTES # BLD AUTO: 0.74 K/UL — SIGNIFICANT CHANGE UP (ref 0–0.9)
MONOCYTES NFR BLD AUTO: 9.6 % — SIGNIFICANT CHANGE UP (ref 2–14)
NEUTROPHILS # BLD AUTO: 5.82 K/UL — SIGNIFICANT CHANGE UP (ref 1.8–7.4)
NEUTROPHILS NFR BLD AUTO: 75.3 % — SIGNIFICANT CHANGE UP (ref 43–77)
NRBC # BLD AUTO: 0 K/UL — SIGNIFICANT CHANGE UP (ref 0–0)
NRBC # FLD: 0 K/UL — SIGNIFICANT CHANGE UP (ref 0–0)
NRBC BLD AUTO-RTO: 0 /100 WBCS — SIGNIFICANT CHANGE UP (ref 0–0)
PLATELET # BLD AUTO: 248 K/UL — SIGNIFICANT CHANGE UP (ref 150–400)
POTASSIUM SERPL-MCNC: 4.3 MMOL/L — SIGNIFICANT CHANGE UP (ref 3.5–5.3)
POTASSIUM SERPL-SCNC: 4.3 MMOL/L — SIGNIFICANT CHANGE UP (ref 3.5–5.3)
PROT SERPL-MCNC: 6.8 G/DL — SIGNIFICANT CHANGE UP (ref 6–8.3)
PROTHROM AB SERPL-ACNC: 12.1 SEC — SIGNIFICANT CHANGE UP (ref 9.9–13.4)
RBC # BLD: 4.84 M/UL — SIGNIFICANT CHANGE UP (ref 4.2–5.8)
RBC # FLD: 13.7 % — SIGNIFICANT CHANGE UP (ref 10.3–14.5)
SODIUM SERPL-SCNC: 143 MMOL/L — SIGNIFICANT CHANGE UP (ref 135–145)
T3 SERPL-MCNC: 107 NG/DL — SIGNIFICANT CHANGE UP (ref 80–200)
T4 AB SER-ACNC: 9.13 UG/DL — SIGNIFICANT CHANGE UP (ref 5.1–13)
TSH SERPL-MCNC: 4.07 UIU/ML — SIGNIFICANT CHANGE UP (ref 0.27–4.2)
WBC # BLD: 7.73 K/UL — SIGNIFICANT CHANGE UP (ref 3.8–10.5)
WBC # FLD AUTO: 7.73 K/UL — SIGNIFICANT CHANGE UP (ref 3.8–10.5)

## 2025-06-12 PROCEDURE — 76536 US EXAM OF HEAD AND NECK: CPT

## 2025-06-12 PROCEDURE — 70491 CT SOFT TISSUE NECK W/DYE: CPT | Mod: 26

## 2025-06-12 PROCEDURE — 99285 EMERGENCY DEPT VISIT HI MDM: CPT

## 2025-06-12 NOTE — ED PROVIDER NOTE - PROGRESS NOTE DETAILS
Received signout.  Patient with 4 weeks of neck pain and mass.  Currently being worked up by his ENT Dr. Elvin Neal and found to have a clot on ultrasound on outside imaging advised to come to [facility] ED for further evaluation.  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  Patient currently protecting airway.  ENT paged.  Everette Chamorro PGY-3 Patient evaluated by ENT, recommending admission for IJ clot management as well as IR biopsy and speech and swallow eval as patient has had some dysphagia recently.  Discussed with hospitalist, will admit and start heparin drip.  Everette Chamorro PGY-3

## 2025-06-12 NOTE — ED PROVIDER NOTE - CLINICAL SUMMARY MEDICAL DECISION MAKING FREE TEXT BOX
JONATHAN: Pt with HTN currently being worked up for R neck pain p/w c/f R IJ thrombus. Pt is following with outpt ENT Dr. Singh who ordered US thyroid which showed R thyroid mass and c/f R IJ thrombus. I reviewed outpt US showing thyroid mass and thrombus, on exam, has some R tracheal deviation but no airways concerns, palpable R thyroid mass that is firm. No RUE swelling or erythem, speaking in full sentences, no issues swallowing but does feel something on the R side of neck when he swallowing. Pt is well appearing, denies h/o VTE, no cp or sob. Plan for labs, CT neck

## 2025-06-12 NOTE — ED PROVIDER NOTE - PHYSICAL EXAMINATION
Const: not in acute distress  Eyes: no conjunctival injection  HEENT: Head NCAT, Moist MM.  Neck: Trachea deviated to left. R thyroid mass.   CVS: +S1/S2, No murmurs or gallops  RESP: Unlabored respiratory effort. Clear to auscultation bilaterally.  GI: Nontender/Nondistended, No CVA tenderness b/l.   Skin: Intact.   Neuro: moving all four extremities  Psych: Awake, Alert, & Cooperative

## 2025-06-12 NOTE — ED ADULT NURSE NOTE - OBJECTIVE STATEMENT
Patient to the ED  with past medical history of hypertension on amlodipine, presented to the emergency room after finding a clot in his jugular vein while undergoing ultrasound.  Patient states he has been having neck pain for the past 5 to 6-week.  He saw his ENT who sent him in for an ultrasound.  Patient reports mild difficulty swallowing and difficulty sleeping. Patient verbalized the discomfort started in right ear and gradually migrated to neck.

## 2025-06-12 NOTE — ED ADULT NURSE NOTE - NSFALLUNIVINTERV_ED_ALL_ED
Bed/Stretcher in lowest position, wheels locked, appropriate side rails in place/Call bell, personal items and telephone in reach/Instruct patient to call for assistance before getting out of bed/chair/stretcher/Non-slip footwear applied when patient is off stretcher/[place] to call system/Physically safe environment - no spills, clutter or unnecessary equipment/Purposeful proactive rounding/Room/bathroom lighting operational, light cord in reach

## 2025-06-12 NOTE — ED ADULT NURSE NOTE - PAIN: PRESENCE, MLM
Proton Pump Inhibitor (PPI) Refill Protocol - 12 Month Protocol Passed 04/21/2023 07:00 AM   Protocol Details  Seen by prescribing provider or same department within the last 12 months or has a future appt in 3 months - IF FAILED PLEASE LOOK AT CHART REVIEW FOR LAST VISIT AND PROCEED ACCORDINGLY    Not on Clopidogrel (Plavix) or if on, refill is for Pantoprazole (Protonix)    Medication (including dose and sig) on current meds list   Criteria met. Refilled per protocol. Last OV 04/10/2023.  
denies pain/discomfort (Rating = 0)

## 2025-06-12 NOTE — ED PROVIDER NOTE - ATTENDING CONTRIBUTION TO CARE
Pt with HTN currently being worked up for R neck pain p/w c/f R IJ thrombus. Pt is following with outpt ENT Dr. Singh who ordered US thyroid which showed R thyroid mass and c/f R IJ thrombus. I reviewed outpt US showing thyroid mass and thrombus, on exam, has some R tracheal deviation but no airways concerns, palpable R thyroid mass that is firm. No RUE swelling or erythem, speaking in full sentences, no issues swallowing but does feel something on the R side of neck when he swallowing. Pt is well appearing, denies h/o VTE, no cp or sob. Plan for labs, CT neck

## 2025-06-12 NOTE — ED ADULT TRIAGE NOTE - CHIEF COMPLAINT QUOTE
pt c/o r sided neck pain had an U/S of the area and was found to have a blood clot in the jugular. sent to the ED by PMD. pt noted to have tracheal deviation. rr even and unlabored, pt tolerating own secretions. charge RN called.

## 2025-06-13 ENCOUNTER — RESULT REVIEW (OUTPATIENT)
Age: 75
End: 2025-06-13

## 2025-06-13 DIAGNOSIS — R22.1 LOCALIZED SWELLING, MASS AND LUMP, NECK: ICD-10-CM

## 2025-06-13 DIAGNOSIS — Z29.9 ENCOUNTER FOR PROPHYLACTIC MEASURES, UNSPECIFIED: ICD-10-CM

## 2025-06-13 DIAGNOSIS — Z98.890 OTHER SPECIFIED POSTPROCEDURAL STATES: Chronic | ICD-10-CM

## 2025-06-13 DIAGNOSIS — I82.890 ACUTE EMBOLISM AND THROMBOSIS OF OTHER SPECIFIED VEINS: ICD-10-CM

## 2025-06-13 DIAGNOSIS — I82.C19 ACUTE EMBOLISM AND THROMBOSIS OF UNSPECIFIED INTERNAL JUGULAR VEIN: ICD-10-CM

## 2025-06-13 DIAGNOSIS — I10 ESSENTIAL (PRIMARY) HYPERTENSION: ICD-10-CM

## 2025-06-13 DIAGNOSIS — N17.9 ACUTE KIDNEY FAILURE, UNSPECIFIED: ICD-10-CM

## 2025-06-13 LAB
ALBUMIN SERPL ELPH-MCNC: 3.8 G/DL — SIGNIFICANT CHANGE UP (ref 3.3–5)
ALP SERPL-CCNC: 69 U/L — SIGNIFICANT CHANGE UP (ref 40–120)
ALT FLD-CCNC: 11 U/L — SIGNIFICANT CHANGE UP (ref 4–41)
ANION GAP SERPL CALC-SCNC: 14 MMOL/L — SIGNIFICANT CHANGE UP (ref 7–14)
APPEARANCE UR: CLEAR — SIGNIFICANT CHANGE UP
APTT BLD: 109.6 SEC — HIGH (ref 26.1–36.8)
APTT BLD: 28.2 SEC — SIGNIFICANT CHANGE UP (ref 26.1–36.8)
AST SERPL-CCNC: 15 U/L — SIGNIFICANT CHANGE UP (ref 4–40)
BACTERIA # UR AUTO: NEGATIVE /HPF — SIGNIFICANT CHANGE UP
BASOPHILS # BLD AUTO: 0.04 K/UL — SIGNIFICANT CHANGE UP (ref 0–0.2)
BASOPHILS NFR BLD AUTO: 0.5 % — SIGNIFICANT CHANGE UP (ref 0–2)
BILIRUB SERPL-MCNC: 0.3 MG/DL — SIGNIFICANT CHANGE UP (ref 0.2–1.2)
BILIRUB UR-MCNC: NEGATIVE — SIGNIFICANT CHANGE UP
BLD GP AB SCN SERPL QL: NEGATIVE — SIGNIFICANT CHANGE UP
BUN SERPL-MCNC: 28 MG/DL — HIGH (ref 7–23)
CALCIUM SERPL-MCNC: 9.4 MG/DL — SIGNIFICANT CHANGE UP (ref 8.4–10.5)
CAST: 1 /LPF — SIGNIFICANT CHANGE UP (ref 0–4)
CHLORIDE SERPL-SCNC: 106 MMOL/L — SIGNIFICANT CHANGE UP (ref 98–107)
CO2 SERPL-SCNC: 22 MMOL/L — SIGNIFICANT CHANGE UP (ref 22–31)
COLOR SPEC: YELLOW — SIGNIFICANT CHANGE UP
CREAT ?TM UR-MCNC: 76 MG/DL — SIGNIFICANT CHANGE UP
CREAT SERPL-MCNC: 1.13 MG/DL — SIGNIFICANT CHANGE UP (ref 0.5–1.3)
DIFF PNL FLD: ABNORMAL
EGFR: 68 ML/MIN/1.73M2 — SIGNIFICANT CHANGE UP
EGFR: 68 ML/MIN/1.73M2 — SIGNIFICANT CHANGE UP
EOSINOPHIL # BLD AUTO: 0.15 K/UL — SIGNIFICANT CHANGE UP (ref 0–0.5)
EOSINOPHIL NFR BLD AUTO: 1.8 % — SIGNIFICANT CHANGE UP (ref 0–6)
GLUCOSE SERPL-MCNC: 96 MG/DL — SIGNIFICANT CHANGE UP (ref 70–99)
GLUCOSE UR QL: NEGATIVE MG/DL — SIGNIFICANT CHANGE UP
HCT VFR BLD CALC: 42.2 % — SIGNIFICANT CHANGE UP (ref 39–50)
HGB BLD-MCNC: 14 G/DL — SIGNIFICANT CHANGE UP (ref 13–17)
IANC: 5.86 K/UL — SIGNIFICANT CHANGE UP (ref 1.8–7.4)
IMM GRANULOCYTES NFR BLD AUTO: 0.6 % — SIGNIFICANT CHANGE UP (ref 0–0.9)
INR BLD: 1.17 RATIO — HIGH (ref 0.85–1.16)
KETONES UR QL: ABNORMAL MG/DL
LEUKOCYTE ESTERASE UR-ACNC: NEGATIVE — SIGNIFICANT CHANGE UP
LYMPHOCYTES # BLD AUTO: 1.25 K/UL — SIGNIFICANT CHANGE UP (ref 1–3.3)
LYMPHOCYTES # BLD AUTO: 15.2 % — SIGNIFICANT CHANGE UP (ref 13–44)
MAGNESIUM SERPL-MCNC: 2.1 MG/DL — SIGNIFICANT CHANGE UP (ref 1.6–2.6)
MCHC RBC-ENTMCNC: 29.9 PG — SIGNIFICANT CHANGE UP (ref 27–34)
MCHC RBC-ENTMCNC: 33.2 G/DL — SIGNIFICANT CHANGE UP (ref 32–36)
MCV RBC AUTO: 90 FL — SIGNIFICANT CHANGE UP (ref 80–100)
MONOCYTES # BLD AUTO: 0.9 K/UL — SIGNIFICANT CHANGE UP (ref 0–0.9)
MONOCYTES NFR BLD AUTO: 10.9 % — SIGNIFICANT CHANGE UP (ref 2–14)
MRSA PCR RESULT.: SIGNIFICANT CHANGE UP
NEUTROPHILS # BLD AUTO: 5.86 K/UL — SIGNIFICANT CHANGE UP (ref 1.8–7.4)
NEUTROPHILS NFR BLD AUTO: 71 % — SIGNIFICANT CHANGE UP (ref 43–77)
NITRITE UR-MCNC: NEGATIVE — SIGNIFICANT CHANGE UP
NRBC # BLD AUTO: 0 K/UL — SIGNIFICANT CHANGE UP (ref 0–0)
NRBC # FLD: 0 K/UL — SIGNIFICANT CHANGE UP (ref 0–0)
NRBC BLD AUTO-RTO: 0 /100 WBCS — SIGNIFICANT CHANGE UP (ref 0–0)
OSMOLALITY UR: 618 MOSM/KG — SIGNIFICANT CHANGE UP (ref 50–1200)
PH UR: 6 — SIGNIFICANT CHANGE UP (ref 5–8)
PHOSPHATE SERPL-MCNC: 3 MG/DL — SIGNIFICANT CHANGE UP (ref 2.5–4.5)
PLATELET # BLD AUTO: 245 K/UL — SIGNIFICANT CHANGE UP (ref 150–400)
POTASSIUM SERPL-MCNC: 3.8 MMOL/L — SIGNIFICANT CHANGE UP (ref 3.5–5.3)
POTASSIUM SERPL-SCNC: 3.8 MMOL/L — SIGNIFICANT CHANGE UP (ref 3.5–5.3)
POTASSIUM UR-SCNC: 50.7 MMOL/L — SIGNIFICANT CHANGE UP
PROT ?TM UR-MCNC: 20 MG/DL — SIGNIFICANT CHANGE UP
PROT SERPL-MCNC: 6.5 G/DL — SIGNIFICANT CHANGE UP (ref 6–8.3)
PROT UR-MCNC: 30 MG/DL
PROT/CREAT UR-RTO: 0.3 RATIO — HIGH (ref 0–0.2)
PROTHROM AB SERPL-ACNC: 13.5 SEC — HIGH (ref 9.9–13.4)
RBC # BLD: 4.69 M/UL — SIGNIFICANT CHANGE UP (ref 4.2–5.8)
RBC # FLD: 14 % — SIGNIFICANT CHANGE UP (ref 10.3–14.5)
RBC CASTS # UR COMP ASSIST: 3 /HPF — SIGNIFICANT CHANGE UP (ref 0–4)
RH IG SCN BLD-IMP: NEGATIVE — SIGNIFICANT CHANGE UP
S AUREUS DNA NOSE QL NAA+PROBE: SIGNIFICANT CHANGE UP
SODIUM SERPL-SCNC: 142 MMOL/L — SIGNIFICANT CHANGE UP (ref 135–145)
SODIUM UR-SCNC: 103 MMOL/L — SIGNIFICANT CHANGE UP
SP GR SPEC: 1.04 — HIGH (ref 1–1.03)
SQUAMOUS # UR AUTO: 0 /HPF — SIGNIFICANT CHANGE UP (ref 0–5)
UROBILINOGEN FLD QL: 0.2 MG/DL — SIGNIFICANT CHANGE UP (ref 0.2–1)
UUN UR-MCNC: 669.5 MG/DL — SIGNIFICANT CHANGE UP
WBC # BLD: 8.25 K/UL — SIGNIFICANT CHANGE UP (ref 3.8–10.5)
WBC # FLD AUTO: 8.25 K/UL — SIGNIFICANT CHANGE UP (ref 3.8–10.5)
WBC UR QL: 1 /HPF — SIGNIFICANT CHANGE UP (ref 0–5)

## 2025-06-13 PROCEDURE — 99223 1ST HOSP IP/OBS HIGH 75: CPT

## 2025-06-13 PROCEDURE — 88173 CYTOPATH EVAL FNA REPORT: CPT | Mod: 26

## 2025-06-13 PROCEDURE — 10005 FNA BX W/US GDN 1ST LES: CPT

## 2025-06-13 RX ORDER — AMLODIPINE BESYLATE 10 MG/1
5 TABLET ORAL DAILY
Refills: 0 | Status: DISCONTINUED | OUTPATIENT
Start: 2025-06-13 | End: 2025-06-14

## 2025-06-13 RX ORDER — HEPARIN SODIUM 1000 [USP'U]/ML
5000 INJECTION INTRAVENOUS; SUBCUTANEOUS ONCE
Refills: 0 | Status: DISCONTINUED | OUTPATIENT
Start: 2025-06-13 | End: 2025-06-13

## 2025-06-13 RX ORDER — HEPARIN SODIUM 1000 [USP'U]/ML
INJECTION INTRAVENOUS; SUBCUTANEOUS
Qty: 25000 | Refills: 0 | Status: DISCONTINUED | OUTPATIENT
Start: 2025-06-13 | End: 2025-06-14

## 2025-06-13 RX ORDER — HEPARIN SODIUM 1000 [USP'U]/ML
2500 INJECTION INTRAVENOUS; SUBCUTANEOUS EVERY 6 HOURS
Refills: 0 | Status: DISCONTINUED | OUTPATIENT
Start: 2025-06-13 | End: 2025-06-13

## 2025-06-13 RX ORDER — APIXABAN 2.5 MG/1
1 TABLET, FILM COATED ORAL
Qty: 60 | Refills: 0
Start: 2025-06-13 | End: 2025-07-12

## 2025-06-13 RX ORDER — HEPARIN SODIUM 1000 [USP'U]/ML
4500 INJECTION INTRAVENOUS; SUBCUTANEOUS EVERY 6 HOURS
Refills: 0 | Status: DISCONTINUED | OUTPATIENT
Start: 2025-06-13 | End: 2025-06-18

## 2025-06-13 RX ORDER — ACETAMINOPHEN 500 MG/5ML
325 LIQUID (ML) ORAL ONCE
Refills: 0 | Status: COMPLETED | OUTPATIENT
Start: 2025-06-13 | End: 2025-06-13

## 2025-06-13 RX ORDER — MELATONIN 5 MG
3 TABLET ORAL AT BEDTIME
Refills: 0 | Status: DISCONTINUED | OUTPATIENT
Start: 2025-06-13 | End: 2025-07-01

## 2025-06-13 RX ORDER — ACETAMINOPHEN 500 MG/5ML
650 LIQUID (ML) ORAL EVERY 6 HOURS
Refills: 0 | Status: DISCONTINUED | OUTPATIENT
Start: 2025-06-13 | End: 2025-07-01

## 2025-06-13 RX ORDER — HEPARIN SODIUM 1000 [USP'U]/ML
5000 INJECTION INTRAVENOUS; SUBCUTANEOUS EVERY 6 HOURS
Refills: 0 | Status: DISCONTINUED | OUTPATIENT
Start: 2025-06-13 | End: 2025-06-13

## 2025-06-13 RX ORDER — HEPARIN SODIUM 1000 [USP'U]/ML
INJECTION INTRAVENOUS; SUBCUTANEOUS
Qty: 25000 | Refills: 0 | Status: DISCONTINUED | OUTPATIENT
Start: 2025-06-13 | End: 2025-06-13

## 2025-06-13 RX ORDER — HEPARIN SODIUM 1000 [USP'U]/ML
4500 INJECTION INTRAVENOUS; SUBCUTANEOUS ONCE
Refills: 0 | Status: COMPLETED | OUTPATIENT
Start: 2025-06-13 | End: 2025-06-13

## 2025-06-13 RX ORDER — HEPARIN SODIUM 1000 [USP'U]/ML
2000 INJECTION INTRAVENOUS; SUBCUTANEOUS EVERY 6 HOURS
Refills: 0 | Status: DISCONTINUED | OUTPATIENT
Start: 2025-06-13 | End: 2025-06-18

## 2025-06-13 RX ORDER — SODIUM CHLORIDE 9 G/1000ML
1000 INJECTION, SOLUTION INTRAVENOUS
Refills: 0 | Status: DISCONTINUED | OUTPATIENT
Start: 2025-06-13 | End: 2025-06-14

## 2025-06-13 RX ADMIN — Medication 5 MILLIGRAM(S): at 23:32

## 2025-06-13 RX ADMIN — Medication 325 MILLIGRAM(S): at 21:53

## 2025-06-13 RX ADMIN — HEPARIN SODIUM 4500 UNIT(S): 1000 INJECTION INTRAVENOUS; SUBCUTANEOUS at 03:20

## 2025-06-13 RX ADMIN — SODIUM CHLORIDE 100 MILLILITER(S): 9 INJECTION, SOLUTION INTRAVENOUS at 14:03

## 2025-06-13 RX ADMIN — Medication 325 MILLIGRAM(S): at 21:23

## 2025-06-13 RX ADMIN — AMLODIPINE BESYLATE 5 MILLIGRAM(S): 10 TABLET ORAL at 07:08

## 2025-06-13 RX ADMIN — Medication 650 MILLIGRAM(S): at 17:11

## 2025-06-13 RX ADMIN — HEPARIN SODIUM 1100 UNIT(S)/HR: 1000 INJECTION INTRAVENOUS; SUBCUTANEOUS at 03:20

## 2025-06-13 RX ADMIN — SODIUM CHLORIDE 100 MILLILITER(S): 9 INJECTION, SOLUTION INTRAVENOUS at 03:39

## 2025-06-13 RX ADMIN — HEPARIN SODIUM 1000 UNIT(S)/HR: 1000 INJECTION INTRAVENOUS; SUBCUTANEOUS at 08:17

## 2025-06-13 RX ADMIN — HEPARIN SODIUM 1100 UNIT(S)/HR: 1000 INJECTION INTRAVENOUS; SUBCUTANEOUS at 07:25

## 2025-06-13 RX ADMIN — HEPARIN SODIUM 4500 UNIT(S): 1000 INJECTION INTRAVENOUS; SUBCUTANEOUS at 17:56

## 2025-06-13 RX ADMIN — HEPARIN SODIUM 1200 UNIT(S)/HR: 1000 INJECTION INTRAVENOUS; SUBCUTANEOUS at 17:52

## 2025-06-13 RX ADMIN — Medication 650 MILLIGRAM(S): at 16:11

## 2025-06-13 RX ADMIN — HEPARIN SODIUM 1200 UNIT(S)/HR: 1000 INJECTION INTRAVENOUS; SUBCUTANEOUS at 19:36

## 2025-06-13 NOTE — H&P ADULT - PROBLEM SELECTOR PLAN 5
DVT: Heparin full AC  Diet: Pending speech and swallow eval, plan for biopsy  Dispo: Pending biopsy plan DVT: Heparin full AC  Diet: NPO Pending speech and swallow eval, plan for biopsy  Dispo: Pending biopsy plan

## 2025-06-13 NOTE — SWALLOW BEDSIDE ASSESSMENT ADULT - H & P REVIEW
Mr. Venegas is a 73 yo M with HTN presenting for R neck mass. Patient was in his normal state of health until about 6 weeks ago when he noticed a R ear ache. Pain was aching, located over R neck and radiated up to R ear. Didnt think anything of it and figured it would go away after some time. During those weeks he developed some difficulty swallowing and occasional headaches that were new for him. He got used to the difficulty swallowing and was able to eat some food but admits that he has had poor PO intake for several weeks now. Endorses weight loss of about 16 lb (140lb -> 124lb). Went to PCP who noted his trachea was deviated to the left side. Sonogram showed a mass in R neck with associated clot in jugular and he was advised to present to ED for further eval. In ED, patient had mild LITZY but otherwise labs WNL. 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 No destruction of the cartilage. The mass extends into the left side in the retropharyngeal in the prevertebral region. 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 Admit to medicine for management of RIJ thrombus and biopsy of R neck lesion/yes

## 2025-06-13 NOTE — PATIENT PROFILE ADULT - HEALTH LITERACY
[facility]  EMERGENCY DEPARTMENT ENCOUNTER      Patient seen at 0618hrs.    CHIEF COMPLAINT    No chief complaint on file.      HPI    Edith Neumann is a 28 year old  woman of uncertain dates who presents to the emergency department for ration of nausea and vomiting.    Patient thinks last menstrual period was the beginning of July.  Now with nausea and repeated episodes of vomiting since .  States has not been able to keep much oral intake down.    Emesis initially was clear yellow but this morning vomited and noticed small amount of red blood present.  Presents to the emergency department for further evaluation.    No fever.  No coughing or shortness of breath.  Occasional abdominal cramping but no abdominal pain.  No vaginal bleeding.  No leakage of fluid.    ALLERGIES    Allergies   Allergen Reactions   • Penicillins HIVES and SWELLING   • Latex RASH       CURRENT MEDICATIONS    No current facility-administered medications on file prior to encounter.     Current Outpatient Medications on File Prior to Encounter   Medication Sig Dispense Refill   • [DISCONTINUED] drospirenone-ethinyl estradiol (MARY LOU) 3-0.02 MG per tablet Take 1 tablet by mouth daily.     • [DISCONTINUED] spironolactone (ALDACTONE) 50 MG tablet Take 100 mg by mouth daily.          PAST MEDICAL HISTORY    Past Medical History:   Diagnosis Date   • Odin-Danlos disease        SURGICAL HISTORY    No past surgical history on file.    SOCIAL HISTORY    Social History     Tobacco Use   • Smoking status: Never   • Smokeless tobacco: Never   Vaping Use   • Vaping Use: Former   Substance Use Topics   • Alcohol use: Not Currently   • Drug use: Not Currently     Types: Marijuana       FAMILY HISTORY    Family History   Problem Relation Age of Onset   • Cancer, Ovarian Mother    • Cancer Mother    • Lymphoma Mother    • Cancer Father    • Cancer, Lung Father    • Cancer Maternal Uncle    • Cancer, Colon Maternal Uncle  
[facility]
      REVIEW OF SYSTEMS    Review of Systems   Constitutional: Negative for fever.   Respiratory: Negative for cough and shortness of breath.    Cardiovascular: Negative for chest pain.   Gastrointestinal: Positive for nausea and vomiting. Negative for abdominal pain.   Genitourinary: Negative for dysuria, vaginal bleeding and vaginal discharge.      _    PHYSICAL EXAM    ED Triage Vitals [09/03/23 0622]   BP (!) 140/85   Heart Rate 77   Resp 14   Temp 97.9 °F (36.6 °C)   SpO2 97 %     Physical Exam  Vitals and nursing note reviewed.   Constitutional:       Comments: Mildly uncomfortable appearing   HENT:      Head: Normocephalic and atraumatic.   Eyes:      Conjunctiva/sclera: Conjunctivae normal.   Cardiovascular:      Rate and Rhythm: Normal rate and regular rhythm.      Heart sounds: No murmur heard.  Pulmonary:      Effort: Pulmonary effort is normal. No respiratory distress.      Breath sounds: Normal breath sounds.   Abdominal:      General: Bowel sounds are normal.      Palpations: Abdomen is soft.      Tenderness: There is no abdominal tenderness.   Musculoskeletal:      Right lower leg: No edema.      Left lower leg: No edema.   Skin:     General: Skin is warm and dry.   Neurological:      Mental Status: She is alert and oriented to person, place, and time.   Psychiatric:         Mood and Affect: Mood normal.            RADIOLOGY    Imaging Results          US OB LESS THAN 14 WKS AND US OB TRANSVAG SINGLE FETUS (Final result)  Result time 09/03/23 11:17:51    Final result                 Impression:    Impression:    Live intrauterine pregnancy with crown-rump length corresponding to  estimated gestational age of 6 weeks 2 days. Ultrasound [facility] 4/26/2024.      Electronically Signed by: LINDSAY JANG M.D.   Signed on: 9/3/2023 11:17 AM   Workstation ID: [id number]             Narrative:      Ultrasound obstetric survey less than 14 week single gestation and  transvaginal pelvic ultrasound    Clinical 
Indication: Vomiting. Positive pregnancy. Abdominal pain.    Technique:   Transabdominal grayscale and Doppler sonography of the pelvis. Transvaginal  sonography done for further evaluation of pelvic structures.    Findings:    Live intrauterine pregnancy with gestational sac containing yolk sac and  fetal pole. Crown-rump length of 5.5 mm corresponding to estimated  gestational age of 6 weeks 2 days. Fetal heart rate of 126 beats per  minute. Bilateral ovaries appear normal in size and morphology. Flow is  demonstrated within both ovaries. No adnexal mass or free fluid.                                  LABS    Results for orders placed or performed during the hospital encounter of 09/03/23   Comprehensive Metabolic Panel   Result Value    Fasting Status     Sodium 142    Potassium 3.6    Chloride 103    Carbon Dioxide 26    Anion Gap 17    Glucose 105 (H)    BUN 10    Creatinine 0.76    Glomerular Filtration Rate >90     Comment: eGFR results = or >60 mL/min/1.73m2 = Normal kidney function. Estimated GFR calculated using the CKD-EPI-R (2021) equation that does not include race in the creatinine calculation.    BUN/Cr 13    Calcium 9.8    Bilirubin, Total 0.8    GOT/AST 17    GPT/ALT 27    Alkaline Phosphatase 40 (L)     Comment: Low Alkaline Phosphatase results may indicate hypophosphatasia, malnutrition, hypothyroidism, or other disease states. Correlate with clinical symptoms.    Albumin 4.7    Protein, Total 8.2    Globulin 3.5    A/G Ratio 1.3   Beta HCG Quantitative Pregnancy   Result Value    HCG, Quantitative 58,449 (H)     Comment:   Gestational age     Expected hCG (mUnits/mL)  0.2 to 1 week         5 to 50  1 to 2 weeks          50 to 500  2 to 3 weeks          100 to 5,000  3 to 4 weeks          500 to 10,000  4 to 5 weeks          1,000 to 50,000  5 to 6 weeks          10,000 to 100,000  6 to 8 weeks          15,000 to 200,000  2 to 3 months         10,000 to 100,000    Non pregnant premenopausal  <=40 
years      <5 mUnits/mL  Perimenopausal              41 to 55 years  <8 mUnits/mL  Postmenopausal              >55 years       <14 mUnits/mL   Urinalysis & Reflex Microscopy With Culture If Indicated   Result Value    COLOR, URINALYSIS Yellow    APPEARANCE, URINALYSIS Clear    GLUCOSE, URINALYSIS Negative    BILIRUBIN, URINALYSIS Negative    KETONES, URINALYSIS 80 (A)    SPECIFIC GRAVITY, URINALYSIS 1.030     Comment: Measured by refractometry    OCCULT BLOOD, URINALYSIS Negative    PH, URINALYSIS 6.0    PROTEIN, URINALYSIS 30 (A)    UROBILINOGEN, URINALYSIS 0.2    NITRITE, URINALYSIS Negative    LEUKOCYTE ESTERASE, URINALYSIS Negative    SQUAMOUS EPITHELIAL, URINALYSIS 1 to 5    ERYTHROCYTES, URINALYSIS None Seen    LEUKOCYTES, URINALYSIS 1 to 5    BACTERIA, URINALYSIS None Seen    HYALINE CASTS, URINALYSIS None Seen    MUCUS Present   CBC with Automated Differential (performable only)   Result Value    WBC 9.1    RBC 3.83 (L)    HGB 12.2    HCT 35.1 (L)    MCV 91.6    MCH 31.9    MCHC 34.8    RDW-CV 11.8    RDW-SD 39.3        NRBC 0    Neutrophil, Percent 79    Lymphocytes, Percent 12    Mono, Percent 9    Eosinophils, Percent 0    Basophils, Percent 0    Immature Granulocytes 0    Absolute Neutrophils 7.1    Absolute Lymphocytes 1.1    Absolute Monocytes 0.8    Absolute Eosinophils  0.0    Absolute Basophils 0.0    Absolute Immature Granulocytes 0.0   Lavender Top   Result Value    Extra Tube Hold for Add Ons   Gold Top   Result Value    Extra Tube Hold for Add Ons   TYPE/SCREEN   Result Value    ABO/RH(D) B Rh Positive    ANTIBODY SCREEN Negative    TYPE AND SCREEN EXPIRATION DATE 09/06/2023 23:59            ED Medication Orders (From admission, onward)    Ordered Start     Status Ordering Provider    09/03/23 0630 09/03/23 0631  lactated ringers bolus 2,000 mL  ONCE         Last MAR action: Completed YUSUF JOHNS    09/03/23 0630 09/03/23 0631  ondansetron (ZOFRAN) injection 4 mg  ONCE         Last 
MAR action: Given NIKO JOHNS          I have reviewed Edith Neumann's previous office visit note from November 30, 2021.         Patient is G1, P0 of uncertain dates who presented to the emergency department for intractable nausea and vomiting.  Symptoms much improved with IV fluids and antiemetics.  Quantitative hCG was well above the discriminatory zone and I did attempt bedside ultrasound to obtain heart rate but intrauterine findings were difficult to discern on the transabdominal study.  Because of this I did order formal ultrasound that shows evidence of IUP at 6 weeks and 2 days with good heart activity.    Patient will be discharged with prescription for antiemetic and she is requesting follow-up for local OB/GYN.    Differential diagnosis for this visit included but not limited to hyperemesis gravidarum, dehydration, and electrolyte abnormality.  Work-up and management as noted above.    FINAL IMPRESSION    Disposition:  Pt will be discharged.      Impression:  1. Hyperemesis gravidarum          Follow up:  Eugenia Hernandez,   [address] [address] [address] [phone]    In 3 days          New Medications:  Discharge Medication List as of 9/3/2023 11:26 AM      START taking these medications    Details   metoCLOPramide (REGLAN) 10 MG tablet Take 1 tablet by mouth 4 times daily as needed for Nausea.Eprescribe, Disp-30 tablet, R-0                    Niko Johns MD  09/03/23 0819    
no

## 2025-06-13 NOTE — PHYSICAL THERAPY INITIAL EVALUATION ADULT - PERTINENT HX OF CURRENT PROBLEM, REHAB EVAL
Pt is a 73 yo M with HTN presenting after 6 weeks of ear ache and difficulty swallowing with CT showing nonocclusive thrombus Right Internal Jugular at the level of thyroid gland, 6 x 6 x 7.6 cm heterogenous partially calcified soft tissue mass in the region of the Right thyroid lobe, also involving the isthmus, causing mass effect on the trachea which is displaced to the left side without significant airway compromise. 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 Admit to medicine for management of RIJ thrombus and biopsy of Right neck mass

## 2025-06-13 NOTE — CONSULT NOTE ADULT - SUBJECTIVE AND OBJECTIVE BOX
OTOLARYNGOLOGY (ENT) CONSULTATION NOTE    PATIENT: JEAN SHAFFER     MRN: [id number]       : 50  DATE OF ADMISSION:25  DATE OF SERVICE:  25 @ 00:41    HISTORY OF PRESENT ILLNESS:  JEAN SHAFFER  is a 74y Male who presents with 4 weeks of R neck pain and dysphagia, sent in by outside ENT Dr. Elvin Neal for clot found on ultrasound.       Patient with 4 weeks of neck pain and mass.  Currently being worked up by his ENT Dr. Elvin Neal and found to have a clot on ultrasound on outside imaging advised to come to [facility] ED for further evaluation.  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  Patient currently protecting airway.  ENT paged.  Everette Chamorro PGY-3.    DISPOSITION:   Prescriptions:   * Outpatient Medication Status not yet specified    ATTESTATION STATEMENT:    Attestations Statements:  Attending Statement: Attending with.     I have personally seen and examined this patient. I have fully participated in the care of this patient. I have made amendments to the documentation where appropriate and otherwise agree with the history, physical exam, and plan as documented by the Resident.     Attending Contribution to Care: Pt with HTN currently being worked up for R neck pain p/w c/f R IJ thrombus. Pt is following with outpt ENT Dr. Singh who ordered US thyroid which showed R thyroid mass and c/f R IJ thrombus. I reviewed outpt US showing thyroid mass and thrombus, on exam, has some R tracheal deviation but no airways concerns, palpable R thyroid mass that is firm. No RUE swelling or erythem, speaking in full sentences, no issues swallowing but does feel something on the R side of neck when he swallowing. Pt is well appearing, denies h/o VTE, no cp or sob. Plan for labs, CT neck.      PAST MEDICAL HISTORY:  HTN (hypertension)        CURRENT MEDICATIONS       HOME MEDICATIONS:      ALLERGIES:  No Known Allergies    SOCIAL HISTORY: Pertinent included in HPI   FAMILY HISTORY: Pertinent included in HPI       SURGICAL HISTORY: Pertinent included in HPI   No significant past surgical history        PHYSICAL EXAMINATION:  General: NAD, A+Ox3  Respiratory: No respiratory distress, stridor, or stertor  Voice quality: normal  Face:  Symmetric without masses or lesions  OU: EOMI  Right: Pinna wnl, EAC clear, TM intact, no effusion  Left: Pinna wnl, EAC clear, TM intact, no effusion  Nose: nasal cavity clear bilaterally, inferior turbinates normal, mucosa normal without crusting or bleeding  OC/OP: tongue normal, floor of mouth WNL, no masses or lesions, OP clear  Neck: soft/flat, no LAD  Neuro: CNII-XII intact      Vital Signs:  T(C): 36.7 (25 @ 00:24), Max: 36.8 (25 @ 18:30)  HR: 61 (25 @ 00:24) (61 - 76)  BP: 150/72 (25 @ 00:24) (149/86 - 150/72)  RR: 18 (25 @ 00:24) (17 - 18)  SpO2: 97% (25 @ 00:24) (97% - 97%)                        14.2   7.73  )-----------( 248      ( 2025 19:09 )             43.3    06-    143  |  106  |  28[H]  ----------------------------<  100[H]  4.3   |  25  |  1.33[H]    Ca    9.9      2025 19:09    TPro  6.8  /  Alb  4.1  /  TBili  0.3  /  DBili  x   /  AST  15  /  ALT  9   /  AlkPhos  71  06-12   PT/INR - ( 2025 19:09 )   PT: 12.1 sec;   INR: 1.02 ratio         PTT - ( 2025 19:09 )  PTT:28.9 [id number]           OTOLARYNGOLOGY (ENT) CONSULTATION NOTE    PATIENT: JEAN SHAFFER     MRN: [id number]       : 50  DATE OF ADMISSION:25  DATE OF SERVICE:  25 @ 00:41    HISTORY OF PRESENT ILLNESS:  JEAN SHAFFER  is a 74y Male PMH HTN who presents with 4 weeks of R neck pain and dysphagia, sent in by outside ENT Dr. Elvin Neal (ENT & Allergy) for R thyroid mass and c/f R IJ thrombus on US. Patient endorses dysphagia but has been kwame soft foods.       PAST MEDICAL HISTORY:  HTN (hypertension)        CURRENT MEDICATIONS       HOME MEDICATIONS:      ALLERGIES:  No Known Allergies    SOCIAL HISTORY: Pertinent included in HPI   FAMILY HISTORY: Pertinent included in HPI       SURGICAL HISTORY: Pertinent included in HPI   No significant past surgical history        PHYSICAL EXAMINATION:  General: NAD, A+Ox3  Respiratory: No respiratory distress, stridor, or stertor  Voice quality: normal  Face:  Symmetric without masses or lesions  OU: EOMI  Nose: nasal cavity clear bilaterally, inferior turbinates normal, mucosa normal without crusting or bleeding  OC/OP: tongue normal, floor of mouth WNL, no masses or lesions, OP clear  Neck: R thyroid mass, firm  Neuro: CNII-XII intact      LARYNGOSCOPY EXAM:     Verbal consent was obtained from patient prior to procedure.    Flexible laryngoscopy was performed and revealed the following:    Nasopharynx had no mass or exudate.    Base of tongue was symmetric and not enlarged.    Vallecula was clear    Epiglottis, both aryepiglottic folds and both false vocal folds were normal    Arytenoids mildly edematous     True vocal folds were fully mobile and without lesions.     Post cricoid area with mild pooling secretions    Interarytenoid edema was absent    The patient tolerated the procedure well.            Vital Signs:  T(C): 36.7 (25 @ 00:24), Max: 36.8 (25 @ 18:30)  HR: 61 (25 @ 00:24) (61 - 76)  BP: 150/72 (25 @ 00:24) (149/86 - 150/72)  RR: 18 (25 @ 00:24) (17 - 18)  SpO2: 97% (25 @ 00:24) (97% - 97%)                        14.2   7.73  )-----------( 248      ( 2025 19:09 )             43.3    06-12    143  |  106  |  28[H]  ----------------------------<  100[H]  4.3   |  25  |  1.33[H]    Ca    9.9      2025 19:09    TPro  6.8  /  Alb  4.1  /  TBili  0.3  /  DBili  x   /  AST  15  /  ALT  9   /  AlkPhos  71  06-12   PT/INR - ( 2025 19:09 )   PT: 12.1 sec;   INR: 1.02 ratio         PTT - ( 2025 19:09 )  PTT:28.9 [id number]

## 2025-06-13 NOTE — H&P ADULT - HISTORY OF PRESENT ILLNESS
Mr. Venegas is a 75 yo M with HTN presenting for R neck mass. Patient was in his normal state of health until about 6 weeks ago when he noticed a R ear ache. Pain was aching, located over R neck and radiated up to R ear. Didnt think anything of it and figured it would go away after some time. During those weeks he developed some difficulty swallowing and occasional headaches that were new for him. He got used to the difficulty swallowing and was able to eat some food but admits that he has had poor PO intake for several weeks now. Endorses weight loss of about 16 lb (140lb -> 124lb). Went to PCP who noted his trachea was deviated to the left side. Sonogram showed a mass in R neck with associated clot in jugular and he was advised to present to ED for further eval. In ED, patient had mild LITZY but otherwise labs WNL. 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 No destruction of the cartilage. The mass extends into the left side in the retropharyngeal in the prevertebral region. 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 Admit to medicine for management of RIJ thrombus and biopsy of R neck lesion    On interview, the patient mentions the above complaints. Otherwise denies fever, chills, recent travel, changes in taste, changes in vision, changes in hearing, chest pain, palpitations, SOB, cough, abd pain, n/v/d/c, muscle aches, joint pain, blood in urine or stools.

## 2025-06-13 NOTE — SWALLOW BEDSIDE ASSESSMENT ADULT - ASR SWALLOW RECOMMEND DIAG
Cinesophagram to objectively assess swallow function given location of thyroid mass/documented deviation of the trachea/VFSS/MBS

## 2025-06-13 NOTE — SWALLOW BEDSIDE ASSESSMENT ADULT - PHARYNGEAL PHASE
Within functional limits Delayed pharyngeal swallow/Wet vocal quality post oral intake Delayed pharyngeal swallow/Wet vocal quality post oral intake/Throat clear post oral intake

## 2025-06-13 NOTE — PHYSICAL THERAPY INITIAL EVALUATION ADULT - ACTIVE RANGE OF MOTION EXAMINATION, REHAB EVAL
cliff. upper extremity Active ROM was WNL (within normal limits)/bilateral  lower extremity Active ROM was WFL (within functional limits)

## 2025-06-13 NOTE — PHYSICAL THERAPY INITIAL EVALUATION ADULT - GENERAL OBSERVATIONS, REHAB EVAL
Pt encountered in semisupine position, no distress, AxOx4, with +IV. Pt agreeable to participate in PT evaluation. Vitals taken; /82mmHg, heart rate 64bpm.

## 2025-06-13 NOTE — PROGRESS NOTE ADULT - SUBJECTIVE AND OBJECTIVE BOX
*******************************  Aleida Miguel MD (PGY-1)  Internal Medicine  Contact via [employer] TEAMS  *******************************      JEAN SHAFFER  74y  MRN: [id number]    Patient is a 74y old  Male who presents with a chief complaint of R neck mass (13 Jun 2025 05:50)      Interval/Overnight Events: no events ON.     Subjective: Pt seen and examined at bedside. Denies fever, CP, SOB, abn pain, N/V, dysuria. Tolerating diet.      MEDICATIONS  (STANDING):  amLODIPine   Tablet 5 milliGRAM(s) Oral daily  heparin  Infusion.  Unit(s)/Hr (11 mL/Hr) IV Continuous <Continuous>  lactated ringers. 1000 milliLiter(s) (100 mL/Hr) IV Continuous <Continuous>    MEDICATIONS  (PRN):  acetaminophen     Tablet .. 650 milliGRAM(s) Oral every 6 hours PRN Temp greater or equal to 38C (100.4F), Mild Pain (1 - 3)  heparin   Injectable 4500 Unit(s) IV Push every 6 hours PRN For aPTT less than 40  heparin   Injectable 2000 Unit(s) IV Push every 6 hours PRN For aPTT between 40 - 57  melatonin 3 milliGRAM(s) Oral at bedtime PRN Insomnia      Objective:    Vitals: Vital Signs Last 24 Hrs  T(C): 36.4 (06-13-25 @ 04:10), Max: 36.8 (06-12-25 @ 18:30)  T(F): 97.6 (06-13-25 @ 04:10), Max: 98.2 (06-12-25 @ 18:30)  HR: 60 (06-13-25 @ 04:10) (60 - 76)  BP: 174/77 (06-13-25 @ 04:10) (149/86 - 174/77)  BP(mean): 91 (06-13-25 @ 00:24) (91 - 91)  RR: 17 (06-13-25 @ 04:10) (17 - 18)  SpO2: 98% (06-13-25 @ 04:10) (97% - 98%)                I&O's Summary      PHYSICAL EXAM:  GENERAL: NAD  HEAD:  Atraumatic, Normocephalic  CHEST/LUNG: Clear to auscultation bilaterally; No rales, rhonchi, wheezing, or rubs  HEART: Regular rate and rhythm; No murmurs, rubs, or gallops  ABDOMEN: Soft, Nontender, Nondistended;   EXT: No LE edema   SKIN: No rashes or lesions  NERVOUS SYSTEM:  Alert & Oriented X3, no focal deficits    LABS:                        14.2   7.73  )-----------( 248      ( 12 Jun 2025 19:09 )             43.3     06-12    143  |  106  |  28[H]  ----------------------------<  100[H]  4.3   |  25  |  1.33[H]    Ca    9.9      12 Jun 2025 19:09    TPro  6.8  /  Alb  4.1  /  TBili  0.3  /  DBili  x   /  AST  15  /  ALT  9   /  AlkPhos  71  06-12    CAPILLARY BLOOD GLUCOSE        PT/INR - ( 12 Jun 2025 19:09 )   PT: 12.1 sec;   INR: 1.02 ratio         PTT - ( 12 Jun 2025 19:09 )  PTT:28.9 sec    Urinalysis Basic - ( 12 Jun 2025 19:09 )    Color: x / Appearance: x / SG: x / pH: x  Gluc: 100 mg/dL / Ketone: x  / Bili: x / Urobili: x   Blood: x / Protein: x / Nitrite: x   Leuk Esterase: x / RBC: x / WBC x   Sq Epi: x / Non Sq Epi: x / Bacteria: x          RADIOLOGY & ADDITIONAL TESTS:         *******************************  Aleida Miguel MD (PGY-1)  Internal Medicine  Contact via [employer] TEAMS  *******************************      JEAN SHAFFER  74y  MRN: [id number]    Patient is a 74y old  Male who presents with a chief complaint of R neck mass (13 Jun 2025 05:50)      Interval/Overnight Events: no events ON.     Subjective: Pt seen and examined at bedside. States still w/ some difficulty swallowing, has no fever, SOB, abd pain, chest pain.     MEDICATIONS  (STANDING):  amLODIPine   Tablet 5 milliGRAM(s) Oral daily  heparin  Infusion.  Unit(s)/Hr (11 mL/Hr) IV Continuous <Continuous>  lactated ringers. 1000 milliLiter(s) (100 mL/Hr) IV Continuous <Continuous>    MEDICATIONS  (PRN):  acetaminophen     Tablet .. 650 milliGRAM(s) Oral every 6 hours PRN Temp greater or equal to 38C (100.4F), Mild Pain (1 - 3)  heparin   Injectable 4500 Unit(s) IV Push every 6 hours PRN For aPTT less than 40  heparin   Injectable 2000 Unit(s) IV Push every 6 hours PRN For aPTT between 40 - 57  melatonin 3 milliGRAM(s) Oral at bedtime PRN Insomnia      Objective:    Vitals: Vital Signs Last 24 Hrs  T(C): 36.4 (06-13-25 @ 04:10), Max: 36.8 (06-12-25 @ 18:30)  T(F): 97.6 (06-13-25 @ 04:10), Max: 98.2 (06-12-25 @ 18:30)  HR: 60 (06-13-25 @ 04:10) (60 - 76)  BP: 174/77 (06-13-25 @ 04:10) (149/86 - 174/77)  BP(mean): 91 (06-13-25 @ 00:24) (91 - 91)  RR: 17 (06-13-25 @ 04:10) (17 - 18)  SpO2: 98% (06-13-25 @ 04:10) (97% - 98%)                I&O's Summary      PHYSICAL EXAM:  GENERAL: NAD  HEAD:  Thyromegaly; tracheal deviation   CHEST/LUNG: Clear to auscultation bilaterally; No rales, rhonchi, wheezing, or rubs  HEART: Regular rate and rhythm; No murmurs, rubs, or gallops  ABDOMEN: Soft, Nontender, Nondistended;   EXT: No LE edema   SKIN: No rashes or lesions  NERVOUS SYSTEM:  Alert & Oriented X3, no focal deficits    LABS:                        14.2   7.73  )-----------( 248      ( 12 Jun 2025 19:09 )             43.3     06-12    143  |  106  |  28[H]  ----------------------------<  100[H]  4.3   |  25  |  1.33[H]    Ca    9.9      12 Jun 2025 19:09    TPro  6.8  /  Alb  4.1  /  TBili  0.3  /  DBili  x   /  AST  15  /  ALT  9   /  AlkPhos  71  06-12    CAPILLARY BLOOD GLUCOSE        PT/INR - ( 12 Jun 2025 19:09 )   PT: 12.1 sec;   INR: 1.02 ratio         PTT - ( 12 Jun 2025 19:09 )  PTT:28.9 sec    Urinalysis Basic - ( 12 Jun 2025 19:09 )    Color: x / Appearance: x / SG: x / pH: x  Gluc: 100 mg/dL / Ketone: x  / Bili: x / Urobili: x   Blood: x / Protein: x / Nitrite: x   Leuk Esterase: x / RBC: x / WBC x   Sq Epi: x / Non Sq Epi: x / Bacteria: x          RADIOLOGY & ADDITIONAL TESTS:

## 2025-06-13 NOTE — PROGRESS NOTE ADULT - PROBLEM SELECTOR PLAN 3
Patient presents after several weeks of poor PO intake due to dysphagia found to have elevated Scr to 1.32  - Baseline in outpatient labs around 1.00  - Suspect due to poor PO intake and hypovolemia   - Cont  cc/hr   - Check urine studies  - Trend BMP daily Patient presents after several weeks of poor PO intake due to dysphagia found to have elevated Scr to 1.32  - Baseline in outpatient labs around 1.00  - Suspect due to poor PO intake and hypovolemia   - Cont  cc/hr   - Check urine studies  - Trend BMP daily    RESOLVED

## 2025-06-13 NOTE — PROGRESS NOTE ADULT - ASSESSMENT
Mr. Venegas is a 75 yo M with HTN presenting after 6 weeks of ear ache and difficulty swallowing with CT showing nonocclusive thrombus R IJ at the level of thyroid gland, 6 x 6 x 7.6 cm heterogenous partially calcified soft tissue mass in the region of the R thyroid lobe, also involving the isthmus, causing mass effect on the trachea which is displaced to the left side without significant airway compromise. 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 Admit to medicine for management of RIJ thrombus and biopsy of R neck mass

## 2025-06-13 NOTE — CONSULT NOTE ADULT - ASSESSMENT
Interventional Radiology    Evaluate for Procedure:     HPI: Mr. Venegas is a 73 yo M with HTN presenting after 6 weeks of ear ache and difficulty swallowing with CT showing nonocclusive thrombus R IJ at the level of thyroid gland, 6 x 6 x 7.6 cm heterogenous partially calcified soft tissue mass in the region of the R thyroid lobe, also involving the isthmus, causing mass effect on the trachea which is displaced to the left side without significant airway compromise. 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 Admitted to medicine for management of RIJ thrombus and biopsy of R neck mass. IR consulted for R thyroid biopsy.      Allergies: No Known Allergies    Medications (Abx/Cardiac/Anticoagulation/Blood Products)  amLODIPine   Tablet: 5 milliGRAM(s) Oral (06-13 @ 07:08)  heparin   Injectable: 4500 Unit(s) IV Push (06-13 @ 03:20)  heparin  Infusion.: 1000 Unit(s)/Hr IV Continuous (06-13 @ 07:26)    Data:  177.8  59  T(C): 36.4  HR: 60  BP: 174/77  RR: 17  SpO2: 98%    -WBC 8.25 / HgB 14.0 / Hct 42.2 / Plt 245  -Na 142 / Cl 106 / BUN 28 / Glucose 96  -K 3.8 / CO2 22 / Cr 1.13  -ALT 11 / Alk Phos 69 / T.Bili 0.3  -INR 1.17 / .6      Radiology:   Right thyroid mass measuring up to 8.0 cm is concerning for neoplasm.  There is leftward tracheal deviation with mild luminal narrowing.  There is approximately 2 cm substernal extension.  Mass effect upon theright IJ results in severe narrowing. Two areas of   tumoral invasion into the right IJ as discussed above. Filling defect   within the right IJ measures up to 1.3 cm with mixed density, possibly   representing mixed tumoral and bland thrombus.      Assessment/Plan:   Mr. Venegas is a 73 yo M with HTN presenting after 6 weeks of ear ache and difficulty swallowing with CT showing nonocclusive thrombus R IJ at the level of thyroid gland, 6 x 6 x 7.6 cm heterogenous partially calcified soft tissue mass in the region of the R thyroid lobe, also involving the isthmus, causing mass effect on the trachea which is displaced to the left side without significant airway compromise. 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 Admitted to medicine for management of RIJ thrombus and biopsy of R neck mass. IR consulted for R thyroid biopsy.    -- IR will plan to perform R thyroid biopsy on 6/13.  -- please complete IR pre-procedure note  -- please place IR procedure request order under Dr. Danny Shafer MD, PGY-2 Resident  Vascular and Interventional Radiology   Available on [employer] Teams    - Non-emergent consults: Place IR consult order in [place]  - Emergent issues (pager): [facility] [phone]; [facility] [phone]; [phone]  - Scheduling questions: [facility] [phone]; [facility] [phone]  - Clinic/outpatient booking: [facility] [phone]; [facility] [phone]

## 2025-06-13 NOTE — ED ADULT NURSE REASSESSMENT NOTE - NS ED NURSE REASSESS COMMENT FT1
Pt started on heparin drip at 11 ml/hr as per full anticoag  nomogram. Pt and family educated on signs and symptoms of bleeding with proper feed back. No complaints of   nausea, dizziness, vomiting  SOB, fever, chills verbalized. rpt PTT is at 851

## 2025-06-13 NOTE — H&P ADULT - PROBLEM SELECTOR PLAN 1
Patient presenting after 6 weeks of ear ache and difficulty swallowing with outpatient sonogram showing neck mass and jugular vein clot  - CT showing nonocclusive thrombus R IJ at the level of thyroid gland, 6 x 6 x 7.6 cm heterogenous partially calcified soft tissue mass in the region of the R thyroid lobe, also involving the isthmus, causing mass effect on the trachea which is displaced to the left side without significant airway compromise  - Differential includes aggressive neoplasm versus multinodular goiter  - Thyroid labs notably WNL; TSH 4.07, T4 9.13, T3 total 107  - ENT following, appreciate recs  - IR consult for biopsy of mass  - S + S eval for associated dysphagia

## 2025-06-13 NOTE — SWALLOW BEDSIDE ASSESSMENT ADULT - ADDITIONAL RECOMMENDATIONS
1. This service to follow as schedule permits to assess for PO tolerance/advancement. 2. Medical team advised to reconsult if change in medical status.

## 2025-06-13 NOTE — H&P ADULT - PROBLEM SELECTOR PLAN 2
Patient with R IJ nonocclusive thrombus in setting of neck mass  - Suspect due to local mass effect and compression of distal R IJ and possible hypercoagulable state   - Started on heparin gtt

## 2025-06-13 NOTE — PHYSICAL THERAPY INITIAL EVALUATION ADULT - NSPTDISCHREC_GEN_A_CORE
Will keep pt on PT program while at [facility] to prevent weakness/deconditioning./No skilled PT needs

## 2025-06-13 NOTE — PHYSICAL THERAPY INITIAL EVALUATION ADULT - ADDITIONAL COMMENTS
Pt lives alone in an apartment with no steps to negotiate; bedroom is on the first floor. Prior to hospital admission, pt ambulated independently using wall/furniture with household ambulation and used a single axis cane with community ambulation. Pt denies any recent falls.    Pt left comfortable seated at edge of bed, NAD, all lines intact, all precautions maintained, with call bell in reach, bed alarm on, and CHUCK Curiel aware.

## 2025-06-13 NOTE — H&P ADULT - NSHPLABSRESULTS_GEN_ALL_CORE
LABS: When present labs, imaging, and ECG were personally reviewed                          14.2   7.73  )-----------( 248      ( 12 Jun 2025 19:09 )             43.3       06-12    143  |  106  |  28[H]  ----------------------------<  100[H]  4.3   |  25  |  1.33[H]    Ca    9.9      12 Jun 2025 19:09    TPro  6.8  /  Alb  4.1  /  TBili  0.3  /  DBili  x   /  AST  15  /  ALT  9   /  AlkPhos  71  06-12       LIVER FUNCTIONS - ( 12 Jun 2025 19:09 )  Alb: 4.1 g/dL / Pro: 6.8 g/dL / ALK PHOS: 71 U/L / ALT: 9 U/L / AST: 15 U/L / GGT: x                    Urinalysis Basic - ( 12 Jun 2025 19:09 )    Color: x / Appearance: x / SG: x / pH: x  Gluc: 100 mg/dL / Ketone: x  / Bili: x / Urobili: x   Blood: x / Protein: x / Nitrite: x   Leuk Esterase: x / RBC: x / WBC x   Sq Epi: x / Non Sq Epi: x / Bacteria: x        PT/INR - ( 12 Jun 2025 19:09 )   PT: 12.1 sec;   INR: 1.02 ratio         PTT - ( 12 Jun 2025 19:09 )  PTT:28.9 sec        RADIOLOGY & ADDITIONAL TESTS:     < from: CT Neck Soft Tissue w/ IV Cont (06.12.25 @ 21:48) >    IMPRESSION:  1.   Nonocclusive thrombus seen in right internal jugular vein at the   level of  thyroid gland. There is compression/effacement of the proximal right   jugular  vein near the right neck base due to thyroid mass, with suspicion for  nonocclusive thrombus, limited assessment in this region.  2.   Limited evaluation of the left jugular vein due to suboptimal  opacification due to phase of study.  3.   6 x 6 x 7.6 cm heterogenous partially calcified soft tissue mass in   the  region of the right lobe thyroid, also involving the isthmus, causing mass  effect on the trachea which is displaced to the left side without   significant  airway compromise. No destruction of the cartilage. The mass extends into   the  left side in the retropharyngeal in the prevertebral region. Findings  concerning for aggressive neoplasm versus multinodular goiter.  4.   Large expansile lucent lesion in the left mandibular body measuring   up to  4 cm, can represent an degenerative cysts versus other lesion such as   myeloma  or metasta degenerates.    < end of copied text >

## 2025-06-13 NOTE — PHYSICAL THERAPY INITIAL EVALUATION ADULT - PATIENT PROFILE REVIEW, REHAB EVAL
ACTIVITY ORDER: Ambulate with Assistance; Spoke with RN Moisés Hinds prior to PT evaluation-->Pt OK for PT consult/OOB activity./yes

## 2025-06-13 NOTE — PROGRESS NOTE ADULT - PROBLEM SELECTOR PLAN 1
Patient presenting after 6 weeks of ear ache and difficulty swallowing with outpatient sonogram showing neck mass and jugular vein clot  - CT showing nonocclusive thrombus R IJ at the level of thyroid gland, 6 x 6 x 7.6 cm heterogenous partially calcified soft tissue mass in the region of the R thyroid lobe, also involving the isthmus, causing mass effect on the trachea which is displaced to the left side without significant airway compromise  - Differential includes aggressive neoplasm versus multinodular goiter  - Thyroid labs notably WNL; TSH 4.07, T4 9.13, T3 total 107  - ENT following, appreciate recs  - IR consult for biopsy of mass  - S + S eval for associated dysphagia Patient presenting after 6 weeks of ear ache and difficulty swallowing with outpatient sonogram showing neck mass and jugular vein clot  CT showing nonocclusive thrombus R IJ at the level of thyroid gland, 6 x 6 x 7.6 cm heterogenous partially calcified soft tissue mass in the region of the R thyroid lobe, also involving the isthmus, causing mass effect on the trachea which is displaced to the left side without significant airway compromise  Differential includes aggressive neoplasm versus multinodular goiter  Thyroid labs notably WNL; TSH 4.07, T4 9.13, T3 total 107    - ENT following, appreciate recs -> no contraindication to d/c after cine/IR biopsy as long as patient can f/u ENT/OMFS outpatient   - IR consult for biopsy of mass -> IR procedure ordered w/ pre-procedur note in   - Passed SS, rec cine -> cine has been ordered

## 2025-06-13 NOTE — PROGRESS NOTE ADULT - PROBLEM SELECTOR PLAN 2
Patient with R IJ nonocclusive thrombus in setting of neck mass  - Suspect due to local mass effect and compression of distal R IJ and possible hypercoagulable state   - Started on heparin gtt Patient with R IJ nonocclusive thrombus in setting of neck mass  - Suspect due to local mass effect and compression of distal R IJ and possible hypercoagulable state   - Started on heparin gtt -> will see if insurance will cover eliquis

## 2025-06-13 NOTE — PROGRESS NOTE ADULT - ATTENDING COMMENTS
74 M with hx of HTN p/w neck pain x 5 weeks found to have thyroid mass with trach deviation and RIJ thrombus, concerning for malignancy    -c/w heparin gtt for RIJ thrombus. plan to transition to DOAC on dc  -ENT consulted- plan for IR thyroid bx. f/u ENT for further recs after bx   -swallow eval- puree/mild thick liquids. pending cineesophagogram

## 2025-06-13 NOTE — SWALLOW BEDSIDE ASSESSMENT ADULT - COMMENTS
6/13 ENT Consult Note: "JEAN SHAFFER  is a 74y Male PMH HTN who presents with 4 weeks of R neck pain and dysphagia, sent in by outside ENT Dr. Elvin Neal (ENT & Allergy) for R thyroid mass and c/f R IJ thrombus on US. Scheduled to see Dr. Robbins on Tues but was sent to ED given imaging findings. Patient endorses dysphagia but has been kwame soft foods without issue. No SOB or difficulty breathing. In ED, AVSS. TFTs wnl. CT neck prelim read with nonocclusive thrombus R IJ at the level of thyroid gland, 6 x 6 x 7.6 cm heterogenous partially calcified soft tissue mass in the region of the R thyroid lobe, also involving the isthmus, causing mass effect on the trachea which is displaced to the left side without significant airway compromise. No destruction of the cartilage. The mass extends into the left side in the retropharyngeal in the prevertebral region. 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 Large expansile lucent lesion in the left mandibular body measuring up to 4 cm, can represent an degenerative cysts versus other lesion such as myeloma or metasta degenerates. On exam, patient breathing comfortably on RA. Denies history of dental pain. Exam with firm R thyroid mass, trachea deviated to L. Scope with mild pooling of secretions post-cricoid, mildly edematous arytenoids, patent airway, both VCs appear mobile."    No chest imaging in PACS.     Patient seen at bedside this morning for clinical swallow evaluation. Patient received asleep and reclined in bed, woke to verbal stimulus, repositioned upright for swallow assessment. AOx3. Patient states that he notices intermittent coughing while eating/drinking. Denies any stuck sensation. Patient states he has "learned to eat slowly" to avoid any difficulties with swallowing. He further notes recent unintentional weight loss of about 20lbs and notes decreased PO intake secondary to odynophagia.

## 2025-06-13 NOTE — CONSULT NOTE ADULT - ASSESSMENT
JEAN SHAFFER  is a 74y Male PMH HTN who presents with 4 weeks of R neck pain and dysphagia, sent in by outside ENT Dr. Elvin Neal (ENT & Allergy) for R thyroid mass and c/f R IJ thrombus on US. Scheduled to see Dr. Robbins on Tues but was sent to ED given imaging findings. Patient endorses dysphagia but has been kwame soft foods without issue. No SOB or difficulty breathing. In ED, AVSS. TFTs wnl. CT neck prelim read with nonocclusive thrombus R IJ at the level of  thyroid gland, 6 x 6 x 7.6 cm heterogenous partially calcified soft tissue mass in the region of the R thyroid lobe, also involving the isthmus, causing mass effect on the trachea which is displaced to the left side without significant airway compromise. No destruction of the cartilage. The mass extends into the left side in the retropharyngeal in the prevertebral region. 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 Large expansile lucent lesion in the left mandibular body measuring up to 4 cm, can represent an degenerative cysts versus other lesion such as myeloma or metasta degenerates. On exam, patient breathing comfortably on RA. Denies history of dental pain. Exam with firm R thyroid mass, trachea deviated to L. Scope with mild pooling of secretions post-cricoid, mildly edematous arytenoids, patent airway, both VCs appear mobile.     PLAN:  - Admit to medicine for treatment of R IJ thrombus  - IR FNA biopsy R thyroid mass  - SLP omer     JEAN SHAFFER  is a 74y Male PMH HTN who presents with 4 weeks of R neck pain and dysphagia, sent in by outside ENT Dr. Elvin Neal (ENT & Allergy) for R thyroid mass and c/f R IJ thrombus on US. Scheduled to see Dr. Robbins on Tues but was sent to ED given imaging findings. Patient endorses dysphagia but has been kwame soft foods without issue. No SOB or difficulty breathing. In ED, AVSS. TFTs wnl. CT neck prelim read with nonocclusive thrombus R IJ at the level of  thyroid gland, 6 x 6 x 7.6 cm heterogenous partially calcified soft tissue mass in the region of the R thyroid lobe, also involving the isthmus, causing mass effect on the trachea which is displaced to the left side without significant airway compromise. No destruction of the cartilage. The mass extends into the left side in the retropharyngeal in the prevertebral region. 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 Large expansile lucent lesion in the left mandibular body measuring up to 4 cm, can represent an degenerative cysts versus other lesion such as myeloma or metasta degenerates. On exam, patient breathing comfortably on RA. Denies history of dental pain. Exam with firm R thyroid mass, trachea deviated to L. Scope with mild pooling of secretions post-cricoid, mildly edematous arytenoids, patent airway, both VCs appear mobile.     PLAN:  - Admit to medicine for treatment of R IJ thrombus  - IR FNA biopsy R thyroid mass (recommend BRAF testing of the pathology specimen)  - SLP eval

## 2025-06-13 NOTE — H&P ADULT - NSHPPHYSICALEXAM_GEN_ALL_CORE
T(C): 36.4 (06-13-25 @ 04:10), Max: 36.8 (06-12-25 @ 18:30)  HR: 60 (06-13-25 @ 04:10) (60 - 76)  BP: 174/77 (06-13-25 @ 04:10) (149/86 - 174/77)  RR: 17 (06-13-25 @ 04:10) (17 - 18)  SpO2: 98% (06-13-25 @ 04:10) (97% - 98%)    CONSTITUTIONAL: Well groomed, no apparent distress  EYES: PERRLA and symmetric, EOMI, No conjunctival or scleral injection, non-icteric  ENMT: Oral mucosa with moist membranes. Normal dentition; no pharyngeal injection or exudates             NECK: Supple, symmetric and without tracheal deviation   RESP: No respiratory distress, no use of accessory muscles; CTA b/l, no WRR  CV: RRR, +S1S2, no MRG; no JVD; no peripheral edema  GI: Soft, NT, ND, no rebound, no guarding; no palpable masses; no hepatosplenomegaly; no hernia palpated  LYMPH: No cervical LAD or tenderness; no axillary LAD or tenderness; no inguinal LAD or tenderness  MSK: Normal gait; No digital clubbing or cyanosis; examination of the (head/neck/spine/ribs/pelvis, RUE, LUE, RLE, LLE) without misalignment,            Normal ROM without pain, no spinal tenderness, normal muscle strength/tone  SKIN: No rashes or ulcers noted; no subcutaneous nodules or induration palpable  NEURO: CN II-XII intact; normal reflexes in upper and lower extremities, sensation intact in upper and lower extremities b/l to light touch   PSYCH: Appropriate insight/judgment; A+O x 3, mood and affect appropriate, recent/remote memory intact T(C): 36.4 (06-13-25 @ 04:10), Max: 36.8 (06-12-25 @ 18:30)  HR: 60 (06-13-25 @ 04:10) (60 - 76)  BP: 174/77 (06-13-25 @ 04:10) (149/86 - 174/77)  RR: 17 (06-13-25 @ 04:10) (17 - 18)  SpO2: 98% (06-13-25 @ 04:10) (97% - 98%)    CONSTITUTIONAL: Well groomed, no apparent distress  EYES: PERRLA and symmetric, EOMI, No conjunctival or scleral injection, non-icteric  ENMT: Oral mucosa with moist membranes. Normal dentition; no pharyngeal injection or exudates  NECK: Trachea deviated left, notable R neck mass just superior to medial R clavicle, firm, no local skin changes. no JVD  RESP: No respiratory distress, no use of accessory muscles; CTA b/l, no WRR  CV: RRR, +S1S2, no MRG; no JVD; no peripheral edema  GI: Soft, NT, ND, no rebound, no guarding; no palpable masses  MSK: Grossly normal appearing  SKIN: No rashes or ulcers noted; no subcutaneous nodules or induration palpable  NEURO: nonfocal    PSYCH: Appropriate insight/judgment; A+O x 3, mood and affect appropriate, recent/remote memory intact

## 2025-06-13 NOTE — SWALLOW BEDSIDE ASSESSMENT ADULT - SWALLOW EVAL: DIAGNOSIS
1. Functional oral stage for puree, soft/bite size solids, regular solids, moderately thick, mildly thick, and thin liquids characterized by adequate oral grading, adequate spoon stripping, adequate cup retrieval, adequate mastication/bolus manipulation, adequate anterior-posterior transport, and timely bolus transfer. 2. Functional pharyngeal stage for puree, moderately thick, and mildly thick liquids characterized by suspected timely swallow trigger, hyolaryngeal excursion upon digital palpation, and no overt si/sx of penetration/aspiration. 3. Mild-moderate pharyngeal dysphagia for soft/bite size solids, regular solids, and thin liquids characterized by suspected delayed swallow trigger, hyolaryngeal excursion upon digital palpation, multiple swallows, and wet vocal quality following PO trials. Patient endorses feelings of "phlegm" and wet vocal quality following these consistencies.

## 2025-06-13 NOTE — H&P ADULT - PROBLEM SELECTOR PLAN 3
Patient presents after several weeks of poor PO intake due to dysphagia found to have elevated Scr to 1.32  - Baseline in outpatient labs around 1.00  - Suspect due to poor PO intake and hypovolemia   - Cont  cc/hr   - Check urine studies  - Trend BMP daily

## 2025-06-14 LAB
ALBUMIN SERPL ELPH-MCNC: 3.6 G/DL — SIGNIFICANT CHANGE UP (ref 3.3–5)
ALP SERPL-CCNC: 63 U/L — SIGNIFICANT CHANGE UP (ref 40–120)
ALT FLD-CCNC: 10 U/L — SIGNIFICANT CHANGE UP (ref 4–41)
ANION GAP SERPL CALC-SCNC: 13 MMOL/L — SIGNIFICANT CHANGE UP (ref 7–14)
APTT BLD: 151.4 SEC — SIGNIFICANT CHANGE UP (ref 26.1–36.8)
APTT BLD: 75.7 SEC — HIGH (ref 26.1–36.8)
APTT BLD: 82.1 SEC — HIGH (ref 26.1–36.8)
AST SERPL-CCNC: 15 U/L — SIGNIFICANT CHANGE UP (ref 4–40)
BASOPHILS # BLD AUTO: 0.03 K/UL — SIGNIFICANT CHANGE UP (ref 0–0.2)
BASOPHILS NFR BLD AUTO: 0.4 % — SIGNIFICANT CHANGE UP (ref 0–2)
BILIRUB SERPL-MCNC: 0.3 MG/DL — SIGNIFICANT CHANGE UP (ref 0.2–1.2)
BUN SERPL-MCNC: 16 MG/DL — SIGNIFICANT CHANGE UP (ref 7–23)
CALCIUM SERPL-MCNC: 9.2 MG/DL — SIGNIFICANT CHANGE UP (ref 8.4–10.5)
CHLORIDE SERPL-SCNC: 102 MMOL/L — SIGNIFICANT CHANGE UP (ref 98–107)
CO2 SERPL-SCNC: 23 MMOL/L — SIGNIFICANT CHANGE UP (ref 22–31)
CREAT SERPL-MCNC: 0.92 MG/DL — SIGNIFICANT CHANGE UP (ref 0.5–1.3)
EGFR: 87 ML/MIN/1.73M2 — SIGNIFICANT CHANGE UP
EGFR: 87 ML/MIN/1.73M2 — SIGNIFICANT CHANGE UP
EOSINOPHIL # BLD AUTO: 0.13 K/UL — SIGNIFICANT CHANGE UP (ref 0–0.5)
EOSINOPHIL NFR BLD AUTO: 1.7 % — SIGNIFICANT CHANGE UP (ref 0–6)
GLUCOSE SERPL-MCNC: 100 MG/DL — HIGH (ref 70–99)
HCT VFR BLD CALC: 41 % — SIGNIFICANT CHANGE UP (ref 39–50)
HCT VFR BLD CALC: 43.3 % — SIGNIFICANT CHANGE UP (ref 39–50)
HGB BLD-MCNC: 13.8 G/DL — SIGNIFICANT CHANGE UP (ref 13–17)
HGB BLD-MCNC: 14.5 G/DL — SIGNIFICANT CHANGE UP (ref 13–17)
IANC: 5.52 K/UL — SIGNIFICANT CHANGE UP (ref 1.8–7.4)
IMM GRANULOCYTES NFR BLD AUTO: 0.4 % — SIGNIFICANT CHANGE UP (ref 0–0.9)
LYMPHOCYTES # BLD AUTO: 1.15 K/UL — SIGNIFICANT CHANGE UP (ref 1–3.3)
LYMPHOCYTES # BLD AUTO: 15.1 % — SIGNIFICANT CHANGE UP (ref 13–44)
MAGNESIUM SERPL-MCNC: 1.9 MG/DL — SIGNIFICANT CHANGE UP (ref 1.6–2.6)
MCHC RBC-ENTMCNC: 29.3 PG — SIGNIFICANT CHANGE UP (ref 27–34)
MCHC RBC-ENTMCNC: 30 PG — SIGNIFICANT CHANGE UP (ref 27–34)
MCHC RBC-ENTMCNC: 33.5 G/DL — SIGNIFICANT CHANGE UP (ref 32–36)
MCHC RBC-ENTMCNC: 33.7 G/DL — SIGNIFICANT CHANGE UP (ref 32–36)
MCV RBC AUTO: 87 FL — SIGNIFICANT CHANGE UP (ref 80–100)
MCV RBC AUTO: 89.6 FL — SIGNIFICANT CHANGE UP (ref 80–100)
MONOCYTES # BLD AUTO: 0.76 K/UL — SIGNIFICANT CHANGE UP (ref 0–0.9)
MONOCYTES NFR BLD AUTO: 10 % — SIGNIFICANT CHANGE UP (ref 2–14)
NEUTROPHILS # BLD AUTO: 5.52 K/UL — SIGNIFICANT CHANGE UP (ref 1.8–7.4)
NEUTROPHILS NFR BLD AUTO: 72.4 % — SIGNIFICANT CHANGE UP (ref 43–77)
NRBC # BLD AUTO: 0 K/UL — SIGNIFICANT CHANGE UP (ref 0–0)
NRBC # BLD AUTO: 0 K/UL — SIGNIFICANT CHANGE UP (ref 0–0)
NRBC # FLD: 0 K/UL — SIGNIFICANT CHANGE UP (ref 0–0)
NRBC # FLD: 0 K/UL — SIGNIFICANT CHANGE UP (ref 0–0)
NRBC BLD AUTO-RTO: 0 /100 WBCS — SIGNIFICANT CHANGE UP (ref 0–0)
NRBC BLD AUTO-RTO: 0 /100 WBCS — SIGNIFICANT CHANGE UP (ref 0–0)
PHOSPHATE SERPL-MCNC: 2.7 MG/DL — SIGNIFICANT CHANGE UP (ref 2.5–4.5)
PLATELET # BLD AUTO: 217 K/UL — SIGNIFICANT CHANGE UP (ref 150–400)
PLATELET # BLD AUTO: 222 K/UL — SIGNIFICANT CHANGE UP (ref 150–400)
POTASSIUM SERPL-MCNC: 3.6 MMOL/L — SIGNIFICANT CHANGE UP (ref 3.5–5.3)
POTASSIUM SERPL-SCNC: 3.6 MMOL/L — SIGNIFICANT CHANGE UP (ref 3.5–5.3)
PROT SERPL-MCNC: 6.3 G/DL — SIGNIFICANT CHANGE UP (ref 6–8.3)
RBC # BLD: 4.71 M/UL — SIGNIFICANT CHANGE UP (ref 4.2–5.8)
RBC # BLD: 4.83 M/UL — SIGNIFICANT CHANGE UP (ref 4.2–5.8)
RBC # FLD: 13.4 % — SIGNIFICANT CHANGE UP (ref 10.3–14.5)
RBC # FLD: 13.9 % — SIGNIFICANT CHANGE UP (ref 10.3–14.5)
SODIUM SERPL-SCNC: 138 MMOL/L — SIGNIFICANT CHANGE UP (ref 135–145)
WBC # BLD: 7.52 K/UL — SIGNIFICANT CHANGE UP (ref 3.8–10.5)
WBC # BLD: 7.62 K/UL — SIGNIFICANT CHANGE UP (ref 3.8–10.5)
WBC # FLD AUTO: 7.52 K/UL — SIGNIFICANT CHANGE UP (ref 3.8–10.5)
WBC # FLD AUTO: 7.62 K/UL — SIGNIFICANT CHANGE UP (ref 3.8–10.5)

## 2025-06-14 PROCEDURE — 99233 SBSQ HOSP IP/OBS HIGH 50: CPT | Mod: GC

## 2025-06-14 RX ORDER — ACETAMINOPHEN 500 MG/5ML
325 LIQUID (ML) ORAL ONCE
Refills: 0 | Status: COMPLETED | OUTPATIENT
Start: 2025-06-14 | End: 2025-06-14

## 2025-06-14 RX ORDER — HEPARIN SODIUM 1000 [USP'U]/ML
1000 INJECTION INTRAVENOUS; SUBCUTANEOUS
Qty: 25000 | Refills: 0 | Status: DISCONTINUED | OUTPATIENT
Start: 2025-06-14 | End: 2025-06-18

## 2025-06-14 RX ORDER — ENOXAPARIN SODIUM 100 MG/ML
60 INJECTION SUBCUTANEOUS
Qty: 30 | Refills: 0
Start: 2025-06-14 | End: 2025-07-13

## 2025-06-14 RX ORDER — RIVAROXABAN 10 MG/1
1 TABLET, FILM COATED ORAL
Qty: 30 | Refills: 0
Start: 2025-06-14 | End: 2025-07-13

## 2025-06-14 RX ORDER — AMLODIPINE BESYLATE 10 MG/1
10 TABLET ORAL DAILY
Refills: 0 | Status: DISCONTINUED | OUTPATIENT
Start: 2025-06-14 | End: 2025-07-01

## 2025-06-14 RX ADMIN — HEPARIN SODIUM 1000 UNIT(S)/HR: 1000 INJECTION INTRAVENOUS; SUBCUTANEOUS at 19:18

## 2025-06-14 RX ADMIN — HEPARIN SODIUM 1000 UNIT(S)/HR: 1000 INJECTION INTRAVENOUS; SUBCUTANEOUS at 07:22

## 2025-06-14 RX ADMIN — Medication 325 MILLIGRAM(S): at 02:30

## 2025-06-14 RX ADMIN — HEPARIN SODIUM 1000 UNIT(S)/HR: 1000 INJECTION INTRAVENOUS; SUBCUTANEOUS at 16:17

## 2025-06-14 RX ADMIN — Medication 325 MILLIGRAM(S): at 20:12

## 2025-06-14 RX ADMIN — Medication 325 MILLIGRAM(S): at 19:42

## 2025-06-14 RX ADMIN — HEPARIN SODIUM 1000 UNIT(S)/HR: 1000 INJECTION INTRAVENOUS; SUBCUTANEOUS at 02:18

## 2025-06-14 RX ADMIN — HEPARIN SODIUM 1000 UNIT(S)/HR: 1000 INJECTION INTRAVENOUS; SUBCUTANEOUS at 09:09

## 2025-06-14 RX ADMIN — AMLODIPINE BESYLATE 5 MILLIGRAM(S): 10 TABLET ORAL at 05:47

## 2025-06-14 RX ADMIN — HEPARIN SODIUM 1000 UNIT(S)/HR: 1000 INJECTION INTRAVENOUS; SUBCUTANEOUS at 12:03

## 2025-06-14 RX ADMIN — Medication 325 MILLIGRAM(S): at 02:00

## 2025-06-14 NOTE — DISCHARGE NOTE PROVIDER - NSDCFUADDAPPT_GEN_ALL_CORE_FT
APPTS ARE READY TO BE MADE: [ ] YES    Best Family or Patient Contact (if needed):    Additional Information about above appointments (if needed):    1: Primary care provider  2: ENT  3: OMFS    Other comments or requests:    APPTS ARE READY TO BE MADE: [ ] YES    Best Family or Patient Contact (if needed):    Additional Information about above appointments (if needed):    1: Primary care provider (Dr. Del Tinsley [phone])  2: ENT  3: Oncology (in one week)    Other comments or requests:    APPTS ARE READY TO BE MADE: [ ] YES    Best Family or Patient Contact (if needed):    Additional Information about above appointments (if needed):    1: Primary care provider (Dr. Del Tinsley [phone])  2: ENT  3: Oncology (in one week)  4: Gricel Gibbons in one week    Other comments or requests:

## 2025-06-14 NOTE — PROGRESS NOTE ADULT - PROBLEM SELECTOR PLAN 3
Patient presents after several weeks of poor PO intake due to dysphagia found to have elevated Scr to 1.32  - Baseline in outpatient labs around 1.00  - Suspect due to poor PO intake and hypovolemia   - Cont  cc/hr   - Check urine studies  - Trend BMP daily    RESOLVED

## 2025-06-14 NOTE — PROVIDER CONTACT NOTE (CRITICAL VALUE NOTIFICATION) - RECOMMENDATIONS
notify MD, follow nomogram - hold heparin for 60mins and decrease rate by 2 (10 cc/hr); unable to complete the scan - re order heparin (ANM also made aware)

## 2025-06-14 NOTE — DISCHARGE NOTE PROVIDER - NSDCCPTREATMENT_GEN_ALL_CORE_FT
PRINCIPAL PROCEDURE  Procedure: CT neck w con  Findings and Treatment: IMPRESSION:  Right thyroid mass measuring up to 8.0 cm is concerning for neoplasm.  There is leftward tracheal deviation with mild luminal narrowing.  There is approximately 2 cm substernal extension.  Mass effect upon theright IJ results in severe narrowing. Two areas of   tumoral invasion into the right IJ as discussed above. Filling defect   within the right IJ measures up to 1.3 cm with mixed density, possibly   representing mixed tumoral and bland thrombus.  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  Grossly similar-appearing narrowing of the right internal jugular vein   with probable areas of tumoral invasion as well as bland thrombus.  5 mm rounded lung nodule within the superior segment of the right lower lobe for which a metastatic lesion cannot be excluded. Recommend continued chest CT follow-up.  --- End of Report ---        SECONDARY PROCEDURE  Procedure: MR MRCP  Findings and Treatment: IMPRESSION:  Pancreas could not be evaluated due to significant motion artifacts.  The lesion in question in the interpolar region of left kidney   demonstrates minimal internal enhancement andmay represent a minimally complex cyst.  A repeat contrast-enhanced MRI/MRCP is advised on outpatient basis to reevaluate pancreas.      Procedure: CT abdomen pelvis  Findings and Treatment: IMPRESSION:  1.  Left interpole 1.4 cm indeterminate renal lesion, increased in size   compared to prior study. Consider further evaluation with MRI as   clinically indicated.  2.  Additional pancreatic hypodense/cysticlesions. Consider further   evaluation with MRCP as clinically indicated.  3.  Moderate stool burden, most notably at the rectum with rectal   distention and wall thickening, which may represent stercoral proctitis.  4.  Prominent right groin lymph node measuring up to 2.8 cm.  5.  Partially visualized large heterogeneous multicystic right neck   lesion.  6.  Incidental partially visualized left vastus medialis intramuscular   lipomatous mass measuring 10.6 x 7.5 cm, increased in size compared to  prior study. Contrast-enhanced MRI is suggested for further evaluation.

## 2025-06-14 NOTE — DISCHARGE NOTE PROVIDER - PROVIDER TOKENS
PROVIDER:[TOKEN:[[id number]],FOLLOWUP:[2 weeks],ESTABLISHEDPATIENT:[T]],PROVIDER:[TOKEN:[[id number]],FOLLOWUP:[1 week]] PROVIDER:[TOKEN:[[id number]],FOLLOWUP:[2 weeks],ESTABLISHEDPATIENT:[T]],PROVIDER:[TOKEN:[[id number]],FOLLOWUP:[1 week]],PROVIDER:[TOKEN:[[id number]],FOLLOWUP:[1 week]]

## 2025-06-14 NOTE — DISCHARGE NOTE PROVIDER - HOSPITAL COURSE
HPI:  Mr. Venegas is a 73 yo M with HTN presenting for R neck mass. Patient was in his normal state of health until about 6 weeks ago when he noticed a R ear ache. Pain was aching, located over R neck and radiated up to R ear. Didnt think anything of it and figured it would go away after some time. During those weeks he developed some difficulty swallowing and occasional headaches that were new for him. He got used to the difficulty swallowing and was able to eat some food but admits that he has had poor PO intake for several weeks now. Endorses weight loss of about 16 lb (140lb -> 124lb). Went to PCP who noted his trachea was deviated to the left side. Sonogram showed a mass in R neck with associated clot in jugular and he was advised to present to ED for further eval. In ED, patient had mild LITZY but otherwise labs WNL. 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 No destruction of the cartilage. The mass extends into the left side in the retropharyngeal in the prevertebral region. 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 Admit to medicine for management of RIJ thrombus and biopsy of R neck lesion    On interview, the patient mentions the above complaints. Otherwise denies fever, chills, recent travel, changes in taste, changes in vision, changes in hearing, chest pain, palpitations, SOB, cough, abd pain, n/v/d/c, muscle aches, joint pain, blood in urine or stools.    Hospital Course:  Patient admitted for neck mass with imaging concerning for thyroid malignancy. ENT consulted and recommended speech and swallow and IR biopsy. IR biopsy on 6/13 done which showed ***. Speech and swallow evaluated patient and recommended pureed diet with thickened liquids, and cinesophagram which showed ***. Course complicated by right IJ thrombus vs. tumor burden, patient start on heparin and transitioned to ** on discharge.     Important medication changes:  1. **AC** HPI:  Mr. Venegas is a 73 yo M with HTN presenting for R neck mass. Patient was in his normal state of health until about 6 weeks ago when he noticed a R ear ache. Pain was aching, located over R neck and radiated up to R ear. Didnt think anything of it and figured it would go away after some time. During those weeks he developed some difficulty swallowing and occasional headaches that were new for him. He got used to the difficulty swallowing and was able to eat some food but admits that he has had poor PO intake for several weeks now. Endorses weight loss of about 16 lb (140lb -> 124lb). Went to PCP who noted his trachea was deviated to the left side. Sonogram showed a mass in R neck with associated clot in jugular and he was advised to present to ED for further eval. In ED, patient had mild LITZY but otherwise labs WNL. 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 No destruction of the cartilage. The mass extends into the left side in the retropharyngeal in the prevertebral region. 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 Admit to medicine for management of RIJ thrombus and biopsy of R neck lesion    On interview, the patient mentions the above complaints. Otherwise denies fever, chills, recent travel, changes in taste, changes in vision, changes in hearing, chest pain, palpitations, SOB, cough, abd pain, n/v/d/c, muscle aches, joint pain, blood in urine or stools.    Hospital Course:  Patient admitted for a neck mass concerning for thyroid malignancy. ENT consulted and recommended speech and swallow evaluation and an IR biopsy. Speech and swallow evaluation recommended a pureed diet with thickened liquids. The patient's course was complicated by a right IJ thrombus versus tumor burden, for which he was started on heparin. The heparin drip was restarted and he was later transitioned to therapeutic Lovenox. His losartan was also increased. BRAF mutation testing returned positive, and the ENT service was notified. 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 The mass was deemed currently unresectable, requiring chemotherapy prior to any surgical intervention. Oncology was consulted, and outpatient chemotherapy was scheduled.    Important medication changes:  1. **AC**   HPI:  Mr. Venegas is a 75 yo M with HTN presenting for R neck mass. Patient was in his normal state of health until about 6 weeks ago when he noticed a R ear ache. Pain was aching, located over R neck and radiated up to R ear. Didn't think anything of it and figured it would go away after some time. During those weeks he developed some difficulty swallowing and occasional headaches that were new for him. He got used to the difficulty swallowing and was able to eat some food but admits that he has had poor PO intake for several weeks now. Endorses weight loss of about 16 lb (140lb -> 124lb). Went to PCP who noted his trachea was deviated to the left side. Sonogram showed a mass in R neck with associated clot in jugular and he was advised to present to ED for further eval. In ED, patient had mild LITZY but otherwise labs WNL. 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 No destruction of the cartilage. The mass extends into the left side in the retropharyngeal in the prevertebral region. 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 Admit to medicine for management of RIJ thrombus and biopsy of R neck lesion    On interview, the patient mentions the above complaints. Otherwise denies fever, chills, recent travel, changes in taste, changes in vision, changes in hearing, chest pain, palpitations, SOB, cough, abd pain, n/v/d/c, muscle aches, joint pain, blood in urine or stools.    Hospital Course:  Patient admitted for a neck mass concerning for thyroid malignancy. ENT consulted and recommended speech and swallow evaluation and an IR biopsy. Speech and swallow evaluation recommended a pureed diet with thickened liquids. The patient's course was complicated by a right IJ thrombus versus tumor burden, for which he was started on heparin. The heparin drip was restarted and he was later transitioned to therapeutic Lovenox. His losartan was also increased. BRAF mutation testing returned positive, and the ENT service was notified. 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 The mass was deemed currently unresectable, requiring chemotherapy prior to any surgical intervention. Oncology was consulted, and outpatient chemotherapy was scheduled. ENT and Oncology offered inpatient tracheostomy and discussed risks and benefits with patient, who ultimately refused, understanding the risk of respiratory compromise given aggressive tumor.     Important medication changes:  1. **AC**   HPI:  Mr. Venegas is a 75 yo M with HTN presenting for R neck mass. Patient was in his normal state of health until about 6 weeks ago when he noticed a R ear ache. Pain was aching, located over R neck and radiated up to R ear. Didn't think anything of it and figured it would go away after some time. During those weeks he developed some difficulty swallowing and occasional headaches that were new for him. He got used to the difficulty swallowing and was able to eat some food but admits that he has had poor PO intake for several weeks now. Endorses weight loss of about 16 lb (140lb -> 124lb). Went to PCP who noted his trachea was deviated to the left side. Sonogram showed a mass in R neck with associated clot in jugular and he was advised to present to ED for further eval. In ED, patient had mild LITZY but otherwise labs WNL. 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 No destruction of the cartilage. The mass extends into the left side in the retropharyngeal in the prevertebral region. 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 Admit to medicine for management of RIJ thrombus and biopsy of R neck lesion    On interview, the patient mentions the above complaints. Otherwise denies fever, chills, recent travel, changes in taste, changes in vision, changes in hearing, chest pain, palpitations, SOB, cough, abd pain, n/v/d/c, muscle aches, joint pain, blood in urine or stools.    Hospital Course:  Patient admitted for a neck mass concerning for thyroid malignancy. ENT consulted and recommended speech and swallow evaluation and an IR biopsy. Speech and swallow evaluation recommended a pureed diet with thickened liquids. The patient's course was complicated by a right IJ thrombus versus tumor burden, for which he was started on heparin. The heparin drip was restarted and he was later transitioned to therapeutic Lovenox. His losartan was also increased. BRAF mutation testing returned positive, and the ENT service was notified. 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 The mass was deemed currently unresectable, requiring chemotherapy prior to any surgical intervention. Oncology was consulted, and outpatient chemotherapy was scheduled. ENT and Oncology offered inpatient tracheostomy and discussed risks and benefits with patient, who ultimately refused, understanding the risk of respiratory compromise given aggressive tumor.     Important medication changes:  1. Trametinib 2 mg oral daily   2. dabrafenib 150 mg oral every 12 hours  3. Lovenox 60mg twice daily    Important follow-up items:  1. Follow-up with your Hematologist/Oncologist within a week.  2. Follow-up with your PCP in two weeks.

## 2025-06-14 NOTE — PROGRESS NOTE ADULT - PROBLEM SELECTOR PLAN 1
Patient presenting after 6 weeks of ear ache and difficulty swallowing with outpatient sonogram showing neck mass and jugular vein clot  CT showing nonocclusive thrombus R IJ at the level of thyroid gland, 6 x 6 x 7.6 cm heterogenous partially calcified soft tissue mass in the region of the R thyroid lobe, also involving the isthmus, causing mass effect on the trachea which is displaced to the left side without significant airway compromise  Differential includes aggressive neoplasm versus multinodular goiter  Thyroid labs notably WNL; TSH 4.07, T4 9.13, T3 total 107  s/p IR biopsy     - ENT following, appreciate recs -> prefer to keep inpatient to see if inpatient chemo needed if anaplastic   - Passed SS, rec cine -> cine has been ordered

## 2025-06-14 NOTE — DISCHARGE NOTE PROVIDER - NSDCMRMEDTOKEN_GEN_ALL_CORE_FT
amLODIPine 5 mg oral tablet: 1 tab(s) orally once a day  Eliquis 5 mg oral tablet: 1 tab(s) orally 2 times a day  Lovenox 60 mg/0.6 mL injectable solution: 60 milligram(s) subcutaneously once a day  Xarelto 10 mg oral tablet: 1 tab(s) orally once a day   amLODIPine 5 mg oral tablet: 1 tab(s) orally once a day  Lovenox 60 mg/0.6 mL injectable solution: 60 milligram(s) subcutaneously once a day   amLODIPine 10 mg oral tablet: 1 tab(s) orally once a day  dabrafenib 75 mg oral capsule: 2 cap(s) orally every 12 hours  losartan 50 mg oral tablet: 1 tab(s) orally once a day  Lovenox 60 mg/0.6 mL injectable solution: 60 milligram(s) subcutaneously 2 times a day  trametinib 2 mg oral tablet: 1 tab(s) orally once a day

## 2025-06-14 NOTE — PROGRESS NOTE ADULT - ASSESSMENT
Mr. Venegas is a 73 yo M with HTN presenting after 6 weeks of ear ache and difficulty swallowing with CT showing nonocclusive thrombus R IJ at the level of thyroid gland, 6 x 6 x 7.6 cm heterogenous partially calcified soft tissue mass in the region of the R thyroid lobe, also involving the isthmus, causing mass effect on the trachea which is displaced to the left side without significant airway compromise. 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 Admit to medicine for management of RIJ thrombus and biopsy of R neck mass

## 2025-06-14 NOTE — DISCHARGE NOTE PROVIDER - DETAILS OF MALNUTRITION DIAGNOSIS/DIAGNOSES
This patient has been assessed with a concern for Malnutrition and was treated during this hospitalization for the following Nutrition diagnosis/diagnoses:     -  06/15/2025: Severe protein-calorie malnutrition   -  06/15/2025: Underweight (BMI < 19)

## 2025-06-14 NOTE — PROGRESS NOTE ADULT - ATTENDING COMMENTS
74M with RIJ thrombus and R thyroid mass. s/p IR biopsy, path pending.     seen at bedside. no acute concerns. breathing comfortably on room air.  noted R neck mass on exam.   BP elevated.     - f/u path  - oncology consult for malignancy workup.   - Cine pending.  - should get CT chest/a/p for malignancy w/u.  - increase amlodipine to 10mg. Patient endorsed taking 10mg at times for elevated BP to 180s at home.

## 2025-06-14 NOTE — PROVIDER CONTACT NOTE (CRITICAL VALUE NOTIFICATION) - ACTION/TREATMENT ORDERED:
MD made aware - will d/c the current order and re order heparin at the new rate of 10 as per nomogram

## 2025-06-14 NOTE — DISCHARGE NOTE PROVIDER - NSFOLLOWUPCLINICS_GEN_ALL_ED_FT
[facility] - ENT  Otolaryngology (ENT)  [address] [place] [address]  Phone: [phone]  Fax:

## 2025-06-14 NOTE — PROGRESS NOTE ADULT - SUBJECTIVE AND OBJECTIVE BOX
*******************************  Aleida Miguel MD (PGY-1)  Internal Medicine  Contact via Microsoft TEAMS  *******************************      JEAN SHAFFER  74y  MRN: [id number]    Patient is a 74y old  Male who presents with a chief complaint of R neck mass (13 Jun 2025 09:13)      Interval/Overnight Events: no events ON.     Subjective: Pt seen and examined at bedside. Denies fever, CP, SOB, abn pain, N/V, dysuria. Tolerating diet.      MEDICATIONS  (STANDING):  amLODIPine   Tablet 5 milliGRAM(s) Oral daily  heparin  Infusion. 1000 Unit(s)/Hr (10 mL/Hr) IV Continuous <Continuous>    MEDICATIONS  (PRN):  acetaminophen     Tablet .. 650 milliGRAM(s) Oral every 6 hours PRN Temp greater or equal to 38C (100.4F), Mild Pain (1 - 3)  heparin   Injectable 4500 Unit(s) IV Push every 6 hours PRN For aPTT less than 40  heparin   Injectable 2000 Unit(s) IV Push every 6 hours PRN For aPTT between 40 - 57  melatonin 3 milliGRAM(s) Oral at bedtime PRN Insomnia      Objective:    Vitals: Vital Signs Last 24 Hrs  T(C): 36.7 (06-14-25 @ 05:24), Max: 36.7 (06-13-25 @ 11:37)  T(F): 98 (06-14-25 @ 05:24), Max: 98.1 (06-13-25 @ 11:37)  HR: 62 (06-14-25 @ 05:24) (52 - 63)  BP: 173/82 (06-14-25 @ 05:24) (152/87 - 199/96)  BP(mean): --  RR: 17 (06-14-25 @ 05:24) (17 - 19)  SpO2: 98% (06-14-25 @ 05:24) (98% - 98%)                I&O's Summary      PHYSICAL EXAM:  GENERAL: NAD  HEAD:  Atraumatic, Normocephalic  CHEST/LUNG: Clear to auscultation bilaterally; No rales, rhonchi, wheezing, or rubs  HEART: Regular rate and rhythm; No murmurs, rubs, or gallops  ABDOMEN: Soft, Nontender, Nondistended;   EXT: No LE edema   SKIN: No rashes or lesions  NERVOUS SYSTEM:  Alert & Oriented X3, no focal deficits    LABS:                        13.8   7.62  )-----------( 217      ( 14 Jun 2025 06:30 )             41.0                         14.0   8.25  )-----------( 245      ( 13 Jun 2025 07:20 )             42.2                         14.2   7.73  )-----------( 248      ( 12 Jun 2025 19:09 )             43.3     06-13    142  |  106  |  28[H]  ----------------------------<  96  3.8   |  22  |  1.13  06-12    143  |  106  |  28[H]  ----------------------------<  100[H]  4.3   |  25  |  1.33[H]    Ca    9.4      13 Jun 2025 07:20  Ca    9.9      12 Jun 2025 19:09  Phos  3.0     06-13  Mg     2.10     06-13    TPro  6.5  /  Alb  3.8  /  TBili  0.3  /  DBili  x   /  AST  15  /  ALT  11  /  AlkPhos  69  06-13  TPro  6.8  /  Alb  4.1  /  TBili  0.3  /  DBili  x   /  AST  15  /  ALT  9   /  AlkPhos  71  06-12    CAPILLARY BLOOD GLUCOSE        PT/INR - ( 13 Jun 2025 07:45 )   PT: 13.5 sec;   INR: 1.17 ratio         PTT - ( 13 Jun 2025 23:40 )  PTT:151.4 sec    Urinalysis Basic - ( 13 Jun 2025 07:20 )    Color: x / Appearance: x / SG: x / pH: x  Gluc: 96 mg/dL / Ketone: x  / Bili: x / Urobili: x   Blood: x / Protein: x / Nitrite: x   Leuk Esterase: x / RBC: x / WBC x   Sq Epi: x / Non Sq Epi: x / Bacteria: x          RADIOLOGY & ADDITIONAL TESTS:         *******************************  Aleida Miguel MD (PGY-1)  Internal Medicine  Contact via Microsoft TEAMS  *******************************      JEAN SHAFFER  74y  MRN: [id number]    Patient is a 74y old  Male who presents with a chief complaint of R neck mass (13 Jun 2025 09:13)      Interval/Overnight Events: Hypertensive to systolic 190's ON, given hydral x1      Subjective: Pt seen and examined at bedside. Denies fever, CP, SOB, abn pain, N/V, dysuria. Tolerating diet.      MEDICATIONS  (STANDING):  amLODIPine   Tablet 5 milliGRAM(s) Oral daily  heparin  Infusion. 1000 Unit(s)/Hr (10 mL/Hr) IV Continuous <Continuous>    MEDICATIONS  (PRN):  acetaminophen     Tablet .. 650 milliGRAM(s) Oral every 6 hours PRN Temp greater or equal to 38C (100.4F), Mild Pain (1 - 3)  heparin   Injectable 4500 Unit(s) IV Push every 6 hours PRN For aPTT less than 40  heparin   Injectable 2000 Unit(s) IV Push every 6 hours PRN For aPTT between 40 - 57  melatonin 3 milliGRAM(s) Oral at bedtime PRN Insomnia      Objective:    Vitals: Vital Signs Last 24 Hrs  T(C): 36.7 (06-14-25 @ 05:24), Max: 36.7 (06-13-25 @ 11:37)  T(F): 98 (06-14-25 @ 05:24), Max: 98.1 (06-13-25 @ 11:37)  HR: 62 (06-14-25 @ 05:24) (52 - 63)  BP: 173/82 (06-14-25 @ 05:24) (152/87 - 199/96)  BP(mean): --  RR: 17 (06-14-25 @ 05:24) (17 - 19)  SpO2: 98% (06-14-25 @ 05:24) (98% - 98%)                I&O's Summary      PHYSICAL EXAM:  GENERAL: NAD  HEAD:  Atraumatic, Normocephalic  CHEST/LUNG: Clear to auscultation bilaterally; No rales, rhonchi, wheezing, or rubs  HEART: Regular rate and rhythm; No murmurs, rubs, or gallops  ABDOMEN: Soft, Nontender, Nondistended;   EXT: No LE edema   SKIN: No rashes or lesions  NERVOUS SYSTEM:  Alert & Oriented X3, no focal deficits    LABS:                        13.8   7.62  )-----------( 217      ( 14 Jun 2025 06:30 )             41.0                         14.0   8.25  )-----------( 245      ( 13 Jun 2025 07:20 )             42.2                         14.2   7.73  )-----------( 248      ( 12 Jun 2025 19:09 )             43.3     06-13    142  |  106  |  28[H]  ----------------------------<  96  3.8   |  22  |  1.13  06-12    143  |  106  |  28[H]  ----------------------------<  100[H]  4.3   |  25  |  1.33[H]    Ca    9.4      13 Jun 2025 07:20  Ca    9.9      12 Jun 2025 19:09  Phos  3.0     06-13  Mg     2.10     06-13    TPro  6.5  /  Alb  3.8  /  TBili  0.3  /  DBili  x   /  AST  15  /  ALT  11  /  AlkPhos  69  06-13  TPro  6.8  /  Alb  4.1  /  TBili  0.3  /  DBili  x   /  AST  15  /  ALT  9   /  AlkPhos  71  06-12    CAPILLARY BLOOD GLUCOSE        PT/INR - ( 13 Jun 2025 07:45 )   PT: 13.5 sec;   INR: 1.17 ratio         PTT - ( 13 Jun 2025 23:40 )  PTT:151.4 sec    Urinalysis Basic - ( 13 Jun 2025 07:20 )    Color: x / Appearance: x / SG: x / pH: x  Gluc: 96 mg/dL / Ketone: x  / Bili: x / Urobili: x   Blood: x / Protein: x / Nitrite: x   Leuk Esterase: x / RBC: x / WBC x   Sq Epi: x / Non Sq Epi: x / Bacteria: x          RADIOLOGY & ADDITIONAL TESTS:         *******************************  Aleida Miguel MD (PGY-1)  Internal Medicine  Contact via [employer] TEAMS  *******************************      JEAN SHAFFER  74y  MRN: [id number]    Patient is a 74y old  Male who presents with a chief complaint of R neck mass (13 Jun 2025 09:13)      Interval/Overnight Events: Hypertensive to systolic 190's ON, given hydral x1      Subjective: Pt seen and examined at bedside. Upset regarding food portions and bed alarm. Discussed possible thyroid Ca diagnosis. Overall some soreness in neck, denies fever, chest pain, SOB, abd pain.       MEDICATIONS  (STANDING):  amLODIPine   Tablet 5 milliGRAM(s) Oral daily  heparin  Infusion. 1000 Unit(s)/Hr (10 mL/Hr) IV Continuous <Continuous>    MEDICATIONS  (PRN):  acetaminophen     Tablet .. 650 milliGRAM(s) Oral every 6 hours PRN Temp greater or equal to 38C (100.4F), Mild Pain (1 - 3)  heparin   Injectable 4500 Unit(s) IV Push every 6 hours PRN For aPTT less than 40  heparin   Injectable 2000 Unit(s) IV Push every 6 hours PRN For aPTT between 40 - 57  melatonin 3 milliGRAM(s) Oral at bedtime PRN Insomnia      Objective:    Vitals: Vital Signs Last 24 Hrs  T(C): 36.7 (06-14-25 @ 05:24), Max: 36.7 (06-13-25 @ 11:37)  T(F): 98 (06-14-25 @ 05:24), Max: 98.1 (06-13-25 @ 11:37)  HR: 62 (06-14-25 @ 05:24) (52 - 63)  BP: 173/82 (06-14-25 @ 05:24) (152/87 - 199/96)  BP(mean): --  RR: 17 (06-14-25 @ 05:24) (17 - 19)  SpO2: 98% (06-14-25 @ 05:24) (98% - 98%)                I&O's Summary      PHYSICAL EXAM:  GENERAL: NAD  HEAD:  Atraumatic, Normocephalic, thyromegaly; soreness on R neck   CHEST/LUNG: Clear to auscultation bilaterally; No rales, rhonchi, wheezing, or rubs  HEART: Regular rate and rhythm; No murmurs, rubs, or gallops  ABDOMEN: Soft, Nontender, Nondistended;   EXT: No LE edema   SKIN: No rashes or lesions  NERVOUS SYSTEM:  Alert & Oriented X3, no focal deficits    LABS:                        13.8   7.62  )-----------( 217      ( 14 Jun 2025 06:30 )             41.0                         14.0   8.25  )-----------( 245      ( 13 Jun 2025 07:20 )             42.2                         14.2   7.73  )-----------( 248      ( 12 Jun 2025 19:09 )             43.3     06-13    142  |  106  |  28[H]  ----------------------------<  96  3.8   |  22  |  1.13  06-12    143  |  106  |  28[H]  ----------------------------<  100[H]  4.3   |  25  |  1.33[H]    Ca    9.4      13 Jun 2025 07:20  Ca    9.9      12 Jun 2025 19:09  Phos  3.0     06-13  Mg     2.10     06-13    TPro  6.5  /  Alb  3.8  /  TBili  0.3  /  DBili  x   /  AST  15  /  ALT  11  /  AlkPhos  69  06-13  TPro  6.8  /  Alb  4.1  /  TBili  0.3  /  DBili  x   /  AST  15  /  ALT  9   /  AlkPhos  71  06-12    CAPILLARY BLOOD GLUCOSE        PT/INR - ( 13 Jun 2025 07:45 )   PT: 13.5 sec;   INR: 1.17 ratio         PTT - ( 13 Jun 2025 23:40 )  PTT:151.4 sec    Urinalysis Basic - ( 13 Jun 2025 07:20 )    Color: x / Appearance: x / SG: x / pH: x  Gluc: 96 mg/dL / Ketone: x  / Bili: x / Urobili: x   Blood: x / Protein: x / Nitrite: x   Leuk Esterase: x / RBC: x / WBC x   Sq Epi: x / Non Sq Epi: x / Bacteria: x          RADIOLOGY & ADDITIONAL TESTS:

## 2025-06-14 NOTE — DISCHARGE NOTE PROVIDER - NSDCHHNEEDSERVICE_GEN_ALL_CORE
Medication teaching and assessment/Observation and assessment/Rehabilitation services/Teaching and training Medication teaching and assessment/Observation and assessment/Teaching and training

## 2025-06-14 NOTE — DISCHARGE NOTE PROVIDER - NSDCFUSCHEDAPPT_GEN_ALL_CORE_FT
Lio Robbins  [facility]  OTOLARYNG [address] [facility]  Scheduled Appointment: 06/17/2025    [facility]  DENTAL [address]  Scheduled Appointment: 07/02/2025     [facility] Physician [facility]  DENTAL [address]  Scheduled Appointment: 07/02/2025

## 2025-06-14 NOTE — PROGRESS NOTE ADULT - PROBLEM SELECTOR PLAN 2
Patient with R IJ nonocclusive thrombus in setting of neck mass    - Suspect due to local mass effect and compression of distal R IJ and possible hypercoagulable state   - Started on heparin gtt -> eliquis not covered, will try xarelto vs. lovenox

## 2025-06-14 NOTE — DISCHARGE NOTE PROVIDER - CARE PROVIDER_API CALL
Del Tinsley  Internal Medicine  [address], [address]  [place] [address]  Phone: [phone]  Fax: [phone]  Established Patient  Follow Up Time: 2 weeks    Emily Mirza  Medical Oncology  [address] [place] [address]  Phone: [phone]  Fax: [phone]  Follow Up Time: 1 week   Del Tinsley  Internal Medicine  [address], [address]  [place] [address]  Phone: [phone]  Fax: [phone]  Established Patient  Follow Up Time: 2 weeks    Emily Mirza  Medical Oncology  [address] [place] [address]  Phone: [phone]  Fax: [phone]  Follow Up Time: 1 week    Gricel Gibbons  Internal Medicine  [address] [place] [address]  Phone: [phone]  Fax: [phone]  Follow Up Time: 1 week

## 2025-06-14 NOTE — DISCHARGE NOTE PROVIDER - NSDCCPCAREPLAN_GEN_ALL_CORE_FT
PRINCIPAL DISCHARGE DIAGNOSIS  Diagnosis: Thyroid mass  Assessment and Plan of Treatment: You came to the hospital due to a neck mass seen on outpatient imaging. You had a thyroid biopsy by IR which showed **. Our speech and swallow team evaluated your swallowing and recommended a cinesophagram which showed **.      SECONDARY DISCHARGE DIAGNOSES  Diagnosis: Jugular vein thrombosis  Assessment and Plan of Treatment: You were found to have a possible clot in your neck vein. You were started on heparin to prevent further clot burden. Please take your ** as prescribed. Please follow up with your primary care provider 1-2 weeks of discharge. Please return to the hospital if you experience shortness of breathing, new weakness/numbness, swelling, or other concerning symptoms.    Diagnosis: Thyroid mass  Assessment and Plan of Treatment:      PRINCIPAL DISCHARGE DIAGNOSIS  Diagnosis: Thyroid mass  Assessment and Plan of Treatment: You came to the hospital due to a neck mass seen on outpatient imaging. 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 Our speech and swallow team evaluated your swallowing and recommended a cinesophagram which showed **.      SECONDARY DISCHARGE DIAGNOSES  Diagnosis: Thyroid mass  Assessment and Plan of Treatment:     Diagnosis: Jugular vein thrombosis  Assessment and Plan of Treatment: You were found to have a possible clot in your neck vein. You were started on heparin to prevent further clot burden. Please take your ** as prescribed. Please follow up with your primary care provider 1-2 weeks of discharge. Please return to the hospital if you experience shortness of breathing, new weakness/numbness, swelling, or other concerning symptoms.     PRINCIPAL DISCHARGE DIAGNOSIS  Diagnosis: Thyroid mass  Assessment and Plan of Treatment: You came to the hospital due to a neck mass seen on outpatient imaging. 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 You were evaluated by our Oncologists who recommended that you start chemotherapeutic medications. You took your first dose while in the hospital with no adverse side effects. It is crucial that you follow-up with your Oncologist and PCP outpatient. Please return the hospital if you experience any difficulty breathing, difficulty swallowing, fever, chills, chest pain, shortness of breath, urinary symptoms, or otherwise feel unwell.      SECONDARY DISCHARGE DIAGNOSES  Diagnosis: Jugular vein thrombosis  Assessment and Plan of Treatment: You were found to have a possible clot in your neck vein. You were started on heparin to prevent further clot burden. You were transitioned to Lovenox while in the hospital. Please take your Lovenox as prescribed. Please follow up with your primary care provider 1-2 weeks of discharge. Please return to the hospital if you experience shortness of breathing, new weakness/numbness, swelling, or other concerning symptoms.    Diagnosis: Hypertension  Assessment and Plan of Treatment: While in the hospital, your blood pressure was persistently elevated despire continuing your home amlodipine. To better manage your blood pressure, your amlodipine dose was increased and another blood pressure medication was added, called Losartan. Please take amlodipine and Losartan as prescribed. Please return to the hospital if you experience shortness of breath, chest pain, dizziness, headache or other concerning symptoms.

## 2025-06-14 NOTE — DISCHARGE NOTE PROVIDER - CARE PROVIDERS DIRECT ADDRESSES
[email or web address]

## 2025-06-15 LAB
APTT BLD: 119.3 SEC — HIGH (ref 26.1–36.8)
APTT BLD: 58.3 SEC — HIGH (ref 26.1–36.8)
APTT BLD: 69.5 SEC — HIGH (ref 26.1–36.8)
HCT VFR BLD CALC: 39.9 % — SIGNIFICANT CHANGE UP (ref 39–50)
HGB BLD-MCNC: 13.4 G/DL — SIGNIFICANT CHANGE UP (ref 13–17)
MCHC RBC-ENTMCNC: 29.3 PG — SIGNIFICANT CHANGE UP (ref 27–34)
MCHC RBC-ENTMCNC: 33.6 G/DL — SIGNIFICANT CHANGE UP (ref 32–36)
MCV RBC AUTO: 87.3 FL — SIGNIFICANT CHANGE UP (ref 80–100)
NRBC # BLD AUTO: 0 K/UL — SIGNIFICANT CHANGE UP (ref 0–0)
NRBC # FLD: 0 K/UL — SIGNIFICANT CHANGE UP (ref 0–0)
NRBC BLD AUTO-RTO: 0 /100 WBCS — SIGNIFICANT CHANGE UP (ref 0–0)
PLATELET # BLD AUTO: 238 K/UL — SIGNIFICANT CHANGE UP (ref 150–400)
RBC # BLD: 4.57 M/UL — SIGNIFICANT CHANGE UP (ref 4.2–5.8)
RBC # FLD: 13.5 % — SIGNIFICANT CHANGE UP (ref 10.3–14.5)
WBC # BLD: 7.11 K/UL — SIGNIFICANT CHANGE UP (ref 3.8–10.5)
WBC # FLD AUTO: 7.11 K/UL — SIGNIFICANT CHANGE UP (ref 3.8–10.5)

## 2025-06-15 PROCEDURE — 71260 CT THORAX DX C+: CPT | Mod: 26

## 2025-06-15 PROCEDURE — 74177 CT ABD & PELVIS W/CONTRAST: CPT | Mod: 26

## 2025-06-15 PROCEDURE — 99233 SBSQ HOSP IP/OBS HIGH 50: CPT | Mod: FS,GC

## 2025-06-15 RX ADMIN — AMLODIPINE BESYLATE 10 MILLIGRAM(S): 10 TABLET ORAL at 05:56

## 2025-06-15 RX ADMIN — HEPARIN SODIUM 900 UNIT(S)/HR: 1000 INJECTION INTRAVENOUS; SUBCUTANEOUS at 16:57

## 2025-06-15 RX ADMIN — HEPARIN SODIUM 900 UNIT(S)/HR: 1000 INJECTION INTRAVENOUS; SUBCUTANEOUS at 09:12

## 2025-06-15 RX ADMIN — HEPARIN SODIUM 900 UNIT(S)/HR: 1000 INJECTION INTRAVENOUS; SUBCUTANEOUS at 15:17

## 2025-06-15 RX ADMIN — HEPARIN SODIUM 900 UNIT(S)/HR: 1000 INJECTION INTRAVENOUS; SUBCUTANEOUS at 23:29

## 2025-06-15 RX ADMIN — HEPARIN SODIUM 1000 UNIT(S)/HR: 1000 INJECTION INTRAVENOUS; SUBCUTANEOUS at 07:28

## 2025-06-15 RX ADMIN — HEPARIN SODIUM 900 UNIT(S)/HR: 1000 INJECTION INTRAVENOUS; SUBCUTANEOUS at 19:18

## 2025-06-15 NOTE — DIETITIAN INITIAL EVALUATION ADULT - REASON FOR ADMISSION
R neck mass  Per chart, Pt is 75 yo M with HTN presenting after 6 weeks of ear ache and difficulty swallowing with CT showing nonocclusive thrombus R IJ at the level of thyroid gland, 6 x 6 x 7.6 cm heterogenous partially calcified soft tissue mass in the region of the R thyroid lobe, also involving the isthmus, causing mass effect on the trachea which is displaced to the left side without significant airway compromise. 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 Admit to medicine for management of RIJ thrombus and biopsy of R neck mass

## 2025-06-15 NOTE — DIETITIAN INITIAL EVALUATION ADULT - ETIOLOGY
Unable to meet increased physiological demand for nutrients  Increased physiological demand for nutrients

## 2025-06-15 NOTE — DIETITIAN INITIAL EVALUATION ADULT - ORAL INTAKE PTA/DIET HISTORY
Patient reports poor appetite/PO intake PTA, consumes a regular Kosher "healthy diet with no sugar" at baseline. Pt confirms NKFA, food intolerance. Pt reported UBW 140lbs/63kg. Pt verbalized wt loss.

## 2025-06-15 NOTE — DIETITIAN INITIAL EVALUATION ADULT - PROBLEM SELECTOR PLAN 5
DVT: Heparin full AC  Diet: NPO Pending speech and swallow eval, plan for biopsy  Dispo: Pending biopsy plan

## 2025-06-15 NOTE — PROGRESS NOTE ADULT - SUBJECTIVE AND OBJECTIVE BOX
Progress Note    06-13-25 (2d)    Patient is a 74y old  Male who presents with a chief complaint of R neck mass (14 Jun 2025 17:22)      Subjective / Overnight Events :  - No acute events overnight.  - Pt seen and examined at bedside.     Additional ROS (if any):    MEDICATIONS  (STANDING):  amLODIPine   Tablet 10 milliGRAM(s) Oral daily  heparin  Infusion. 1000 Unit(s)/Hr (10 mL/Hr) IV Continuous <Continuous>    MEDICATIONS  (PRN):  acetaminophen     Tablet .. 650 milliGRAM(s) Oral every 6 hours PRN Temp greater or equal to 38C (100.4F), Mild Pain (1 - 3)  heparin   Injectable 4500 Unit(s) IV Push every 6 hours PRN For aPTT less than 40  heparin   Injectable 2000 Unit(s) IV Push every 6 hours PRN For aPTT between 40 - 57  melatonin 3 milliGRAM(s) Oral at bedtime PRN Insomnia          PHYSICAL EXAM:  Vital Signs Last 24 Hrs  T(C): 36.4 ([address] [place] 2025 05:38), Max: 36.9 (14 Jun 2025 22:39)  T(F): 97.5 ([address] [place] 2025 05:38), Max: 98.4 (14 Jun 2025 22:39)  HR: 55 ([address] [place] 2025 05:38) (55 - 84)  BP: 167/88 ([address] [place] 2025 05:38) (144/82 - 167/88)  BP(mean): --  RR: 17 ([address] [place] 2025 05:38) (17 - 17)  SpO2: 96% ([address] [place] 2025 05:38) (94% - 99%)    Parameters below as of [address] [place] 2025 05:38  Patient On (Oxygen Delivery Method): room air        I&O's Summary      General: NAD, non-toxic appearing   HEENT: PERRLA, EOMi, no scleral icterus  CV: RRR, normal S1 and S2, no m/r/g  Lungs: normal respiratory effort. CTAB, no wheezes, rales, or rhonchi  Abd: soft, nontender, nondistended  Ext: no edema, 2+ peripheral pulses   Pysch: AAOx3, appropriate affect   Neuro: grossly non-focal, moving all extremities spontaneously   Skin: no rashes or lesions     LABS:  CAPILLARY BLOOD GLUCOSE                                  13.4   7.11  )-----------( 238      ( [address] [place] 2025 06:10 )             39.9       WBC Trend: 7.11<--, 7.52<--, 7.62<--  Hb Trend: 13.4<--, 14.5<--, 13.8<--, 14.0<--, 14.2<--    06-14    138  |  102  |  16  ----------------------------<  100[H]  3.6   |  23  |  0.92    Ca    9.2      14 Jun 2025 06:30  Phos  2.7     06-14  Mg     1.90     06-14    TPro  6.3  /  Alb  3.6  /  TBili  0.3  /  DBili  x   /  AST  15  /  ALT  10  /  AlkPhos  63  06-14    PT/INR - ( 13 Jun 2025 07:45 )   PT: 13.5 sec;   INR: 1.17 ratio         PTT - ( 14 Jun 2025 15:37 )  PTT:82.1 sec      Urinalysis Basic - ( 14 Jun 2025 06:30 )    Color: x / Appearance: x / SG: x / pH: x  Gluc: 100 mg/dL / Ketone: x  / Bili: x / Urobili: x   Blood: x / Protein: x / Nitrite: x   Leuk Esterase: x / RBC: x / WBC x   Sq Epi: x / Non Sq Epi: x / Bacteria: x            RADIOLOGY & ADDITIONAL TESTS: Reviewed

## 2025-06-15 NOTE — DIETITIAN NUTRITION RISK NOTIFICATION - ETIOLOGY-BASIS
[facility]   Positive Test Results and Return to Work Information    5/1/2022    Dear Vivien Carrillo,     Employee Health received notice of your positive COVID-19 test on 5/1/22. You will need to stay off work and keep out of public places, except to seek medical care.     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    According to [facility]’s new COVID-19 guidelines, and the date of your positive test:     YOUR EXPECTED RETURN TO WORK DATE IS:  5/7/22    The Care Companion on your [employer] brittani has been turned on to track and report your symptoms. Employee Health will follow those symptoms. If symptoms become severe please seek medical attention from your primary care provider or the Emergency Room if needed.    When You Can (not must) Return to Work Criteria (all criteria must be met):  ·  A minimum of 5 days has passed since your symptoms started or positive test date if you do not have symptoms  · Your temperature has remained less than 100.0°F for at least 24 hours without using any fever reducing medications (ibuprofen, acetaminophen, etc.)  · Significant improvement in respiratory symptoms and cough  • No new or worsening symptoms   • If you are free of fever, and do not have a persistent cough, shortness of breath, runny nose, or weakness     Remember this is guidance and we do not encourage sick health care workers to be at work, speak with your leader if you are still ill.     YOU DO NOT NEED TO RETEST TO RETURN TO WORK     If you do not meet the above criteria, you will need to alert your leader per your sick call policy and if your symptoms remain longer than 10 days, you will need to seek care from your primary care provider or Urgent Care for symptom management, and for your return to work.     Absence:   • If you continue to have a medical reason to remain off work, you will need to follow  your department’s normal unscheduled absence process or seek care from your primary care provider.   • If your absence needs a leave of absence you will need to apply by calling [facility] (in [place]) at [phone] or [employer] (in [place]) at [phone]. Medical certification by a Primary Care Provider is required to support the need for a medical leave of absence. Employee Health is unable to provide this function. If you fail to provide the required medical certification, the leave will not be approved.    You must continue to adhere to strict use of appropriate personal protective equipment (PPE) while in the workplace.     Employee Health will follow your care companion reported symptoms for 5 days and then discontinue.       Thank you,     [facility]  Email: [email or web address]  Hotline: [phone]    CC: Manager   Chronic illness

## 2025-06-15 NOTE — DIETITIAN INITIAL EVALUATION ADULT - PERTINENT LABORATORY DATA
06-14    138  |  102  |  16  ----------------------------<  100[H]  3.6   |  23  |  0.92    Ca    9.2      14 Jun 2025 06:30  Phos  2.7     06-14  Mg     1.90     06-14    TPro  6.3  /  Alb  3.6  /  TBili  0.3  /  DBili  x   /  AST  15  /  ALT  10  /  AlkPhos  63  06-14

## 2025-06-15 NOTE — DIETITIAN INITIAL EVALUATION ADULT - ADD RECOMMEND
- Continue current diet order, which remains appropriate at this time. Defer food and fluid consistency to SLP/Team.  - Recommend swallow evaluation to assess appropriateness of current diet consistency.   - Please consistently document % PO intake in nursing flowsheets to assess adequacy of nutritional intake/monitor need for further nutritional intervention(s).   - Monitor weights, diet tolerance, skin integrity, BMs, pertinent labs.   - RDN services remain available as needed.

## 2025-06-15 NOTE — DIETITIAN INITIAL EVALUATION ADULT - OTHER CALCULATIONS
Defer fluid needs to MD/Team.   Ideal Body Weight: 166lbs / 75.2kg +/-10%   Wt hx as per Jame HIE: 59kg (dosing wt 6/13), 62.1kg (5/12), 63.5kg (11/18), 63.5kg (5/13). Wt loss x1 month (5%).

## 2025-06-15 NOTE — DIETITIAN INITIAL EVALUATION ADULT - OTHER INFO
Pt seen at bedside. Pt dislike institutional food. On admission patient reported "mild difficulty swallowing".  Per RN flowsheet, Pt with poor-fair PO intake 51-75% noted. Per swallow bedside assessment 6/13, recommended Puree with mildly thick liquids. VFSS/MBS; Cinesophagram to objectively assess swallow function. Consider new swallow evaluation for possible diet upgrade. Pt refused oral nutritional supplement stated that is contributing to his cancer. No GI distress reported i.e. nausea, vomiting, diarrhea. Pt reported last BM before admission. Not noted to be on a bowel regimen. Recommend to continue to monitor and document BMs in RN flowsheet. Bowel regimen per medical team. Right side of neck edema 2+ noted. Nutrition focused physical exam conducted, Pt found with signs of subcutaneous fat loss & muscle wasting. RD remains available upon request and will follow up per protocol.

## 2025-06-15 NOTE — PROGRESS NOTE ADULT - ATTENDING COMMENTS
74M with RIJ thrombus and R thyroid mass. s/p IR biopsy, path pending.     seen at bedside. no acute concerns. breathing comfortably on room air. no pain.   stable R neck mass.   BP improved today.     - f/u path  - oncology consult for malignancy workup.   - Cine pending.  - CT chest/a/p for malignancy w/u.  - f/u ENT reg surgical plan. 74M with RIJ thrombus and R thyroid mass. s/p IR biopsy, path pending.     seen at bedside. no acute concerns. breathing comfortably on room air. no pain.   stable R neck mass.   BP improved today.     - f/u path  - oncology consult for malignancy workup.   - Cine pending.  - CT chest/a/p for malignancy w/u.  - f/u ENT reg surgical plan.    of note, patient does not have family support in the [place]. only contact is a sister who lives in [place]. has some friends here, but no designated HCP.

## 2025-06-15 NOTE — DIETITIAN INITIAL EVALUATION ADULT - PERTINENT MEDS FT
MEDICATIONS  (STANDING):  amLODIPine   Tablet 10 milliGRAM(s) Oral daily  heparin  Infusion. 1000 Unit(s)/Hr (10 mL/Hr) IV Continuous <Continuous>    MEDICATIONS  (PRN):  acetaminophen     Tablet .. 650 milliGRAM(s) Oral every 6 hours PRN Temp greater or equal to 38C (100.4F), Mild Pain (1 - 3)  heparin   Injectable 4500 Unit(s) IV Push every 6 hours PRN For aPTT less than 40  heparin   Injectable 2000 Unit(s) IV Push every 6 hours PRN For aPTT between 40 - 57  melatonin 3 milliGRAM(s) Oral at bedtime PRN Insomnia

## 2025-06-16 LAB
APTT BLD: 57.4 SEC — HIGH (ref 26.1–36.8)
HCT VFR BLD CALC: 40.2 % — SIGNIFICANT CHANGE UP (ref 39–50)
HGB BLD-MCNC: 13.1 G/DL — SIGNIFICANT CHANGE UP (ref 13–17)
MCHC RBC-ENTMCNC: 29.7 PG — SIGNIFICANT CHANGE UP (ref 27–34)
MCHC RBC-ENTMCNC: 32.6 G/DL — SIGNIFICANT CHANGE UP (ref 32–36)
MCV RBC AUTO: 91.2 FL — SIGNIFICANT CHANGE UP (ref 80–100)
NRBC # BLD AUTO: 0 K/UL — SIGNIFICANT CHANGE UP (ref 0–0)
NRBC # FLD: 0 K/UL — SIGNIFICANT CHANGE UP (ref 0–0)
NRBC BLD AUTO-RTO: 0 /100 WBCS — SIGNIFICANT CHANGE UP (ref 0–0)
PLATELET # BLD AUTO: 206 K/UL — SIGNIFICANT CHANGE UP (ref 150–400)
RBC # BLD: 4.41 M/UL — SIGNIFICANT CHANGE UP (ref 4.2–5.8)
RBC # FLD: 13.8 % — SIGNIFICANT CHANGE UP (ref 10.3–14.5)
WBC # BLD: 7.7 K/UL — SIGNIFICANT CHANGE UP (ref 3.8–10.5)
WBC # FLD AUTO: 7.7 K/UL — SIGNIFICANT CHANGE UP (ref 3.8–10.5)

## 2025-06-16 PROCEDURE — 74230 X-RAY XM SWLNG FUNCJ C+: CPT | Mod: 26

## 2025-06-16 PROCEDURE — 99232 SBSQ HOSP IP/OBS MODERATE 35: CPT | Mod: GC

## 2025-06-16 RX ORDER — SENNA 187 MG
2 TABLET ORAL AT BEDTIME
Refills: 0 | Status: DISCONTINUED | OUTPATIENT
Start: 2025-06-16 | End: 2025-07-01

## 2025-06-16 RX ORDER — POLYETHYLENE GLYCOL 3350 17 G/17G
17 POWDER, FOR SOLUTION ORAL DAILY
Refills: 0 | Status: DISCONTINUED | OUTPATIENT
Start: 2025-06-16 | End: 2025-07-01

## 2025-06-16 RX ORDER — LOSARTAN POTASSIUM 100 MG/1
25 TABLET, FILM COATED ORAL DAILY
Refills: 0 | Status: DISCONTINUED | OUTPATIENT
Start: 2025-06-16 | End: 2025-06-21

## 2025-06-16 RX ADMIN — AMLODIPINE BESYLATE 10 MILLIGRAM(S): 10 TABLET ORAL at 05:38

## 2025-06-16 RX ADMIN — HEPARIN SODIUM 900 UNIT(S)/HR: 1000 INJECTION INTRAVENOUS; SUBCUTANEOUS at 19:41

## 2025-06-16 RX ADMIN — HEPARIN SODIUM 900 UNIT(S)/HR: 1000 INJECTION INTRAVENOUS; SUBCUTANEOUS at 19:32

## 2025-06-16 RX ADMIN — HEPARIN SODIUM 900 UNIT(S)/HR: 1000 INJECTION INTRAVENOUS; SUBCUTANEOUS at 07:28

## 2025-06-16 NOTE — SWALLOW VFSS/MBS ASSESSMENT ADULT - PHARYNGEAL PHASE COMMENTS
reduced base of tongue retraction, reduced pharyngeal constriction reduced laryngeal elevation with incomplete laryngeal vestibule closure, reduced base of tongue retraction, reduced pharyngeal constriction

## 2025-06-16 NOTE — PROGRESS NOTE ADULT - PROBLEM SELECTOR PLAN 1
Patient presenting after 6 weeks of ear ache and difficulty swallowing with outpatient sonogram showing neck mass and jugular vein clot  CT showing nonocclusive thrombus R IJ at the level of thyroid gland, 6 x 6 x 7.6 cm heterogenous partially calcified soft tissue mass in the region of the R thyroid lobe, also involving the isthmus, causing mass effect on the trachea which is displaced to the left side without significant airway compromise  Differential includes aggressive neoplasm versus multinodular goiter  Thyroid labs notably WNL; TSH 4.07, T4 9.13, T3 total 107  s/p IR biopsy   CTAP for staging showing: increased L renal pole lesion, additional pancreatic cysts, R groin lymph node, lipomatous mass on L vastus medialis increased in size    - ENT following, appreciate recs -> prefer to keep inpatient to see if inpatient chemo needed if anaplastic   - Passed SS, rec cine -> cine has been ordered  - F/u pathology

## 2025-06-16 NOTE — SWALLOW VFSS/MBS ASSESSMENT ADULT - DIAGNOSTIC IMPRESSIONS
Patient presents with Functional Oral stage and Moderate Pharyngeal stage dysphagia. Oral stage is characterized by adequate oral containment, adequate bolus formation, adequate mastication for Soft and Bite Sized solids, adequate anterior posterior transfer of bolus and adequate oral clearance for Puree/Soft and Bite Sized/Mildly Thick Liquids/Moderately Thick Liquids. Pharyngeal stage is marked by delayed initiation of pharyngeal swallow (Bolus Head at Vallecular for Moderately Thick Liquids), reduced laryngeal elevation with incomplete laryngeal vestibule closure, reduced base of tongue retraction and reduced pharyngeal constriction. Mild pharyngeal clearance deficits post primary swallow for Puree/Soft and Bite Sized, located primarily in vallecular and pyriform sinuses; spontaneous re-swallows and liquid wash assists with pharyngeal clearance. Trace-Mild Pharyngeal clearance deficit for Moderately Thick Liquids/Mildly Thick Liquids post primary swallow, located primarily in vallecular/pyriform sinuses; spontaneous re-swallow assists with pharyngeal clearance. There was Laryngeal Penetration during the swallow for Moderately Thick Liquids via large cup sip without retrieval, leaving trace residue in laryngeal vestibule; cued small sips/teaspoon presentations of Moderately Thick Liquids benefits to eliminate Laryngeal Penetration. There is Deep Laryngeal Penetration during the swallow for Mildly Thick Liquids, leading to subsequent Aspiration after the swallow for Mildly Thick Liquids. Patient is sensate to the Aspiration given delayed cough response, though cough does not effectively clear residue from the airway/trachea. Teaspoon presentations of Mildly Thick Liquids do not benefit to eliminate airway invasion.

## 2025-06-16 NOTE — SWALLOW VFSS/MBS ASSESSMENT ADULT - ADDITIONAL RECOMMENDATIONS
5.) This service to follow up for diet tolerance/dysphagia therapy as schedule permits.  6.) Medical Team advised to reconsult this service should there be a change in patient status.   7.) Patient will benefit from repeat Cinesophagram pending treatment plan/medical management of Right thyroid mass.

## 2025-06-16 NOTE — SWALLOW VFSS/MBS ASSESSMENT ADULT - RECOMMENDED FEEDING/EATING TECHNIQUES
2.) SAFE SWALLOWING GUIDELINES: Upright position with PO and 20-30 min after, TAKE SMALL BITES/SIPS, Swallow 2x per bite/sip.

## 2025-06-16 NOTE — PROGRESS NOTE ADULT - ATTENDING COMMENTS
74M with hx of HTN p/w neck pain  found to have RIJ thrombus and R thyroid mass. On hep ggt. S/p IR biopsy, path pending. S/p CT C/A/P w/ several abnormalities.     CT C/A/P Notable for:  1.  Left interpole 1.4 cm indeterminate renal lesion, increased in size compared to prior study.   2.  Additional pancreatic hypodense/cystic lesions.   3.Prominent right groin lymph node measuring up to 2.8 cm.  4.  Partially visualized large heterogeneous multicystic right neck lesion.  6. Incidental partially visualized left vastus medialis intramuscular lipomatous mass measuring 10.6 x 7.5 cm, increased in size compared to   prior study.     - F/u path  - Team d/w ENT who wants path results prior to dc in case of surgical plan. Based on findings on path will plan for oncology consult.   - On soft and bite size diet. Pending Cine.   -C/w hep ggt pending ENT final plan. Will transition to oral once no further plan for interventions established   -MRI abd/ L femur ordered for noted abnormal imaging. If pt ready for dc prior to imaging then can be done as outpt.   -Moderate stool burden noted on imaging. BM regimen     Dc planning pending thyroid path results 74M with hx of HTN p/w neck pain  found to have RIJ thrombus and R thyroid mass. On hep ggt. S/p IR biopsy, path pending. S/p CT C/A/P w/ several abnormalities.     CT C/A/P Notable for:  1.  Left interpole 1.4 cm indeterminate renal lesion, increased in size compared to prior study.   2.  Additional pancreatic hypodense/cystic lesions.   3.Prominent right groin lymph node measuring up to 2.8 cm.  4.  Partially visualized large heterogeneous multicystic right neck lesion.  6. Incidental partially visualized left vastus medialis intramuscular lipomatous mass measuring 10.6 x 7.5 cm, increased in size compared to   prior study.     - F/u path  - Team d/w ENT who wants path results prior to dc in case of surgical plan. Based on findings on path will plan for oncology consult.   - On soft and bite size diet. Pending Cine.   -C/w hep ggt pending ENT final plan. Will transition to oral once no further plan for interventions established   -MRI abd/ L femur ordered for noted abnormal imaging. If pt ready for dc prior to imaging then can be done as outpt.   -Moderate stool burden noted on imaging. BM regimen   -BPs not at goal. C/w amlodipine 10mg qd. Pt reports BPs being in the 140s systolic at home. Monitor BPs. Can consider adding additional antihypertensive based on trend.     Dc planning pending thyroid path results

## 2025-06-16 NOTE — PROGRESS NOTE ADULT - SUBJECTIVE AND OBJECTIVE BOX
======Overnight/Subjective======  Overnight- NAEON     Subjective- pt seen and examined at bedside.     Brief daily plan- ENT reccs     ======Medications======  MEDICATIONS  (STANDING):  amLODIPine   Tablet 10 milliGRAM(s) Oral daily  heparin  Infusion. 1000 Unit(s)/Hr (10 mL/Hr) IV Continuous <Continuous>    MEDICATIONS  (PRN):  acetaminophen     Tablet .. 650 milliGRAM(s) Oral every 6 hours PRN Temp greater or equal to 38C (100.4F), Mild Pain (1 - 3)  heparin   Injectable 4500 Unit(s) IV Push every 6 hours PRN For aPTT less than 40  heparin   Injectable 2000 Unit(s) IV Push every 6 hours PRN For aPTT between 40 - 57  melatonin 3 milliGRAM(s) Oral at bedtime PRN Insomnia      ======Vital Signs======  T(C): 36.4 (25 @ 05:25), Max: 36.6 (06-15-25 @ 12:41)  T(F): 97.5 (25 @ 05:25), Max: 97.8 (06-15-25 @ 12:41)  HR: 62 (25 @ 05:25) (56 - 63)  BP: 174/81 (25 @ 05:25) (143/68 - 174/81)  BP(mean): --  RR: 17 (25 @ 05:25) (17 - 17)  SpO2: 97% (25 @ 05:25) (96% - 97%)    ======Physical exams======    General: NAD, non-toxic appearing   HEENT: PERRLA, EOMi, no scleral icterus  CV: RRR, normal S1 and S2, no m/r/g  Lungs: normal respiratory effort. CTAB, no wheezes, rales, or rhonchi  Abd: soft, nontender, nondistended  Ext: no edema, 2+ peripheral pulses   Pysch: AAOx3, appropriate affect   Neuro: grossly non-focal, moving all extremities spontaneously   Skin: no rashes or lesions     ======Labs======                13.1  7.70 >----------< 206  (MCV: 91.2)                40.2   138 | 102 | 16  -----------------------< 100[H]  3.6 | 23 | 0.92    TPro: 6.3 / Alb: 3.6 / TBili: 0.3 / DBili: -- / AlkPhos: 63 / ALT: 10 / AST: 15 (25 @ 06:30)  Ca: 9.2 / Phos: 2.7 / M.90 (25 @ 06:30)      PTT - ( 2025 04:45 )  PTT:57.4 sec    ======Microbiology======        ======I&O's======  I&O's Summary    15 [place] 2025 07:01  -  2025 07:00  --------------------------------------------------------  IN: 0 mL / OUT: 500 mL / NET: -500 mL

## 2025-06-16 NOTE — SWALLOW VFSS/MBS ASSESSMENT ADULT - COMMENTS
6/16 Internal Medicine Note: "Mr. Venegas is a 73 yo M with HTN presenting after 6 weeks of ear ache and difficulty swallowing with CT showing nonocclusive thrombus R IJ at the level of thyroid gland, 6 x 6 x 7.6 cm heterogenous partially calcified soft tissue mass in the region of the R thyroid lobe, also involving the isthmus, causing mass effect on the trachea which is displaced to the left side without significant airway compromise. 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 Admit to medicine for management of RIJ thrombus and biopsy of R neck mass."    Patient is known to this service; Clinical Swallow Evaluation completed 6/13/25 with recommendations for Puree/Mildly Thick Liquids and Cinesophagram (See consult for details).    Patient received in Radiology Suite via stretcher for Cinesophagram. Patient transferred to specialized seating unit with lateral view projection. Patient reports decreased PO and intake and dislike for Puree consistency. Agreeable to PO trials for purpose of participation in Cinesophagram.

## 2025-06-16 NOTE — SWALLOW VFSS/MBS ASSESSMENT ADULT - ROSENBEK'S PENETRATION ASPIRATION SCALE
(7) contrast passes glottis, visible subglottic residue remains despite patient’s response (aspiration) PA 3 large cup sip, PA 1 for small/single sip or teaspoon presentation (1) no aspiration, contrast does not enter airway

## 2025-06-17 ENCOUNTER — APPOINTMENT (OUTPATIENT)
Dept: OTOLARYNGOLOGY | Facility: CLINIC | Age: 75
End: 2025-06-17

## 2025-06-17 LAB
ANION GAP SERPL CALC-SCNC: 12 MMOL/L — SIGNIFICANT CHANGE UP (ref 7–14)
APTT BLD: 30.5 SEC — SIGNIFICANT CHANGE UP (ref 26.1–36.8)
APTT BLD: 48.1 SEC — HIGH (ref 26.1–36.8)
APTT BLD: 51.9 SEC — HIGH (ref 26.1–36.8)
BASOPHILS # BLD AUTO: 0.02 K/UL — SIGNIFICANT CHANGE UP (ref 0–0.2)
BASOPHILS NFR BLD AUTO: 0.2 % — SIGNIFICANT CHANGE UP (ref 0–2)
BUN SERPL-MCNC: 21 MG/DL — SIGNIFICANT CHANGE UP (ref 7–23)
CALCIUM SERPL-MCNC: 8.8 MG/DL — SIGNIFICANT CHANGE UP (ref 8.4–10.5)
CHLORIDE SERPL-SCNC: 104 MMOL/L — SIGNIFICANT CHANGE UP (ref 98–107)
CO2 SERPL-SCNC: 23 MMOL/L — SIGNIFICANT CHANGE UP (ref 22–31)
CREAT SERPL-MCNC: 0.93 MG/DL — SIGNIFICANT CHANGE UP (ref 0.5–1.3)
EGFR: 86 ML/MIN/1.73M2 — SIGNIFICANT CHANGE UP
EGFR: 86 ML/MIN/1.73M2 — SIGNIFICANT CHANGE UP
EOSINOPHIL # BLD AUTO: 0.16 K/UL — SIGNIFICANT CHANGE UP (ref 0–0.5)
EOSINOPHIL NFR BLD AUTO: 1.8 % — SIGNIFICANT CHANGE UP (ref 0–6)
GLUCOSE SERPL-MCNC: 97 MG/DL — SIGNIFICANT CHANGE UP (ref 70–99)
HCT VFR BLD CALC: 38.8 % — LOW (ref 39–50)
HGB BLD-MCNC: 12.8 G/DL — LOW (ref 13–17)
IANC: 6.95 K/UL — SIGNIFICANT CHANGE UP (ref 1.8–7.4)
IMM GRANULOCYTES NFR BLD AUTO: 0.4 % — SIGNIFICANT CHANGE UP (ref 0–0.9)
LYMPHOCYTES # BLD AUTO: 0.93 K/UL — LOW (ref 1–3.3)
LYMPHOCYTES # BLD AUTO: 10.3 % — LOW (ref 13–44)
MAGNESIUM SERPL-MCNC: 1.9 MG/DL — SIGNIFICANT CHANGE UP (ref 1.6–2.6)
MCHC RBC-ENTMCNC: 29.1 PG — SIGNIFICANT CHANGE UP (ref 27–34)
MCHC RBC-ENTMCNC: 33 G/DL — SIGNIFICANT CHANGE UP (ref 32–36)
MCV RBC AUTO: 88.2 FL — SIGNIFICANT CHANGE UP (ref 80–100)
MONOCYTES # BLD AUTO: 0.94 K/UL — HIGH (ref 0–0.9)
MONOCYTES NFR BLD AUTO: 10.4 % — SIGNIFICANT CHANGE UP (ref 2–14)
NEUTROPHILS # BLD AUTO: 6.95 K/UL — SIGNIFICANT CHANGE UP (ref 1.8–7.4)
NEUTROPHILS NFR BLD AUTO: 76.9 % — SIGNIFICANT CHANGE UP (ref 43–77)
NRBC # BLD AUTO: 0 K/UL — SIGNIFICANT CHANGE UP (ref 0–0)
NRBC # FLD: 0 K/UL — SIGNIFICANT CHANGE UP (ref 0–0)
NRBC BLD AUTO-RTO: 0 /100 WBCS — SIGNIFICANT CHANGE UP (ref 0–0)
PHOSPHATE SERPL-MCNC: 3.1 MG/DL — SIGNIFICANT CHANGE UP (ref 2.5–4.5)
PLATELET # BLD AUTO: 213 K/UL — SIGNIFICANT CHANGE UP (ref 150–400)
POTASSIUM SERPL-MCNC: 3.9 MMOL/L — SIGNIFICANT CHANGE UP (ref 3.5–5.3)
POTASSIUM SERPL-SCNC: 3.9 MMOL/L — SIGNIFICANT CHANGE UP (ref 3.5–5.3)
RBC # BLD: 4.4 M/UL — SIGNIFICANT CHANGE UP (ref 4.2–5.8)
RBC # FLD: 13.8 % — SIGNIFICANT CHANGE UP (ref 10.3–14.5)
SODIUM SERPL-SCNC: 139 MMOL/L — SIGNIFICANT CHANGE UP (ref 135–145)
WBC # BLD: 9.04 K/UL — SIGNIFICANT CHANGE UP (ref 3.8–10.5)
WBC # FLD AUTO: 9.04 K/UL — SIGNIFICANT CHANGE UP (ref 3.8–10.5)

## 2025-06-17 PROCEDURE — 99232 SBSQ HOSP IP/OBS MODERATE 35: CPT | Mod: GC

## 2025-06-17 PROCEDURE — 74183 MRI ABD W/O CNTR FLWD CNTR: CPT | Mod: 26

## 2025-06-17 RX ADMIN — HEPARIN SODIUM 2000 UNIT(S): 1000 INJECTION INTRAVENOUS; SUBCUTANEOUS at 08:35

## 2025-06-17 RX ADMIN — HEPARIN SODIUM 1100 UNIT(S)/HR: 1000 INJECTION INTRAVENOUS; SUBCUTANEOUS at 16:31

## 2025-06-17 RX ADMIN — LOSARTAN POTASSIUM 25 MILLIGRAM(S): 100 TABLET, FILM COATED ORAL at 05:24

## 2025-06-17 RX ADMIN — HEPARIN SODIUM 1100 UNIT(S)/HR: 1000 INJECTION INTRAVENOUS; SUBCUTANEOUS at 19:12

## 2025-06-17 RX ADMIN — HEPARIN SODIUM 1000 UNIT(S)/HR: 1000 INJECTION INTRAVENOUS; SUBCUTANEOUS at 08:29

## 2025-06-17 RX ADMIN — HEPARIN SODIUM 1300 UNIT(S)/HR: 1000 INJECTION INTRAVENOUS; SUBCUTANEOUS at 23:30

## 2025-06-17 RX ADMIN — Medication 2 TABLET(S): at 21:43

## 2025-06-17 RX ADMIN — HEPARIN SODIUM 2000 UNIT(S): 1000 INJECTION INTRAVENOUS; SUBCUTANEOUS at 16:38

## 2025-06-17 RX ADMIN — HEPARIN SODIUM 900 UNIT(S)/HR: 1000 INJECTION INTRAVENOUS; SUBCUTANEOUS at 07:25

## 2025-06-17 RX ADMIN — AMLODIPINE BESYLATE 10 MILLIGRAM(S): 10 TABLET ORAL at 05:24

## 2025-06-17 RX ADMIN — POLYETHYLENE GLYCOL 3350 17 GRAM(S): 17 POWDER, FOR SOLUTION ORAL at 11:41

## 2025-06-17 RX ADMIN — HEPARIN SODIUM 1300 UNIT(S)/HR: 1000 INJECTION INTRAVENOUS; SUBCUTANEOUS at 23:33

## 2025-06-17 NOTE — PROGRESS NOTE ADULT - PROBLEM SELECTOR PLAN 1
Patient presenting after 6 weeks of ear ache and difficulty swallowing with outpatient sonogram showing neck mass and jugular vein clot  CT showing nonocclusive thrombus R IJ at the level of thyroid gland, 6 x 6 x 7.6 cm heterogenous partially calcified soft tissue mass in the region of the R thyroid lobe, also involving the isthmus, causing mass effect on the trachea which is displaced to the left side without significant airway compromise  Differential includes aggressive neoplasm versus multinodular goiter  Thyroid labs notably WNL; TSH 4.07, T4 9.13, T3 total 107  s/p IR biopsy   CTAP for staging showing: increased L renal pole lesion, additional pancreatic cysts, R groin lymph node, lipomatous mass on L vastus medialis increased in size    - ENT following, appreciate recs -> prefer to keep inpatient to see if inpatient chemo needed if anaplastic   - S/p MBS- soft and bite sized with mod thick liquids due to laryngeal aspiration   - F/u cytopathology

## 2025-06-17 NOTE — PROGRESS NOTE ADULT - PROBLEM SELECTOR PLAN 2
Patient with R IJ nonocclusive thrombus in setting of neck mass    - Suspect due to local mass effect and compression of distal R IJ and possible hypercoagulable state   - C/w heparin gtt -> eliquis not covered, will try xarelto vs. lovenox

## 2025-06-17 NOTE — PROGRESS NOTE ADULT - ATTENDING COMMENTS
74M with hx of HTN p/w neck pain  found to have RIJ thrombus and R thyroid mass. On hep ggt. S/p IR biopsy, path pending. S/p CT C/A/P w/ several abnormalities.     CT C/A/P Notable for:  1.  Left interpole 1.4 cm indeterminate renal lesion, increased in size compared to prior study.   2.  Additional pancreatic hypodense/cystic lesions.   3.Prominent right groin lymph node measuring up to 2.8 cm.  4.  Partially visualized large heterogeneous multicystic right neck lesion.  6. Incidental partially visualized left vastus medialis intramuscular lipomatous mass measuring 10.6 x 7.5 cm, increased in size compared to   prior study.     - F/u path  - Team d/w ENT who wants path results prior to dc in case of surgical plan. Based on findings on path will plan for oncology consult.   - Now s/p Cinesophagram for dysphagia w/u and recs appreciated. On soft and bite size diet.   -C/w hep ggt pending ENT final plan. Will transition to oral once no further plan for interventions established   -MRI abd/ L femur ordered for noted abnormal imaging. If pt ready for dc prior to imaging then can be done as outpt.   -Moderate stool burden noted on imaging and pt is constipated. C/w BM regimen   -BPs intermittently not at goal. C/w amlodipine 10mg qd and losartan 25,g qd (added 6/16).     Dc planning pending thyroid path results

## 2025-06-17 NOTE — PROGRESS NOTE ADULT - SUBJECTIVE AND OBJECTIVE BOX
Patient is a 74y old  Male who presents with a chief complaint of R neck mass (2025 08:06)      ======Overnight/Subjective======  Overnight- NAEON    Subjective- pt seen and examined. no acute concerns.     Brief daily plan- pending MR studies, pathology    ======Medications======  MEDICATIONS  (STANDING):  amLODIPine   Tablet 10 milliGRAM(s) Oral daily  heparin  Infusion. 1000 Unit(s)/Hr (10 mL/Hr) IV Continuous <Continuous>  losartan 25 milliGRAM(s) Oral daily  polyethylene glycol 3350 17 Gram(s) Oral daily  senna 2 Tablet(s) Oral at bedtime    MEDICATIONS  (PRN):  acetaminophen     Tablet .. 650 milliGRAM(s) Oral every 6 hours PRN Temp greater or equal to 38C (100.4F), Mild Pain (1 - 3)  heparin   Injectable 4500 Unit(s) IV Push every 6 hours PRN For aPTT less than 40  heparin   Injectable 2000 Unit(s) IV Push every 6 hours PRN For aPTT between 40 - 57  melatonin 3 milliGRAM(s) Oral at bedtime PRN Insomnia      ======Vital Signs======  T(C): 36.5 (25 @ 05:18), Max: 37 (25 @ 21:07)  T(F): 97.7 (25 @ 05:18), Max: 98.6 (25 @ 21:07)  HR: 58 (25 @ 05:18) (58 - 70)  BP: 173/74 (25 @ 05:18) (136/72 - 173/74)  BP(mean): --  RR: 16 (25 @ 05:18) (16 - 18)  SpO2: 96% (25 @ 05:18) (96% - 96%)    ======Physical exams======    General: NAD, non-toxic appearing   HEENT: PERRLA, EOMi, no scleral icterus. right-midline mass  CV: RRR, normal S1 and S2, no m/r/g  Lungs: normal respiratory effort. CTAB, no wheezes, rales, or rhonchi  Abd: soft, nontender, nondistended  Ext: no edema, 2+ peripheral pulses   Pysch: AAOx3, appropriate affect   Neuro: grossly non-focal, moving all extremities spontaneously   Skin: no rashes or lesions     ======Labs======                12.8[L]  9.04 >----------< 213  (MCV: 88.2)                38.8[L]   138 | 102 | 16  -----------------------< 100[H]  3.6 | 23 | 0.92    TPro: 6.3 / Alb: 3.6 / TBili: 0.3 / DBili: -- / AlkPhos: 63 / ALT: 10 / AST: 15 (25 @ 06:30)  Ca: 9.2 / Phos: 2.7 / M.90 (25 @ 06:30)      PTT - ( 2025 06:40 )  PTT:48.1 sec    ======Microbiology======        ======I&O's======  I&O's Summary    2025 07:01  -  2025 07:00  --------------------------------------------------------  IN: 108 mL / OUT: 900 mL / NET: -792 mL

## 2025-06-18 ENCOUNTER — RESULT REVIEW (OUTPATIENT)
Age: 75
End: 2025-06-18

## 2025-06-18 LAB
ANION GAP SERPL CALC-SCNC: 12 MMOL/L — SIGNIFICANT CHANGE UP (ref 7–14)
APTT BLD: 46.8 SEC — HIGH (ref 26.1–36.8)
BLD GP AB SCN SERPL QL: NEGATIVE — SIGNIFICANT CHANGE UP
BUN SERPL-MCNC: 20 MG/DL — SIGNIFICANT CHANGE UP (ref 7–23)
CALCIUM SERPL-MCNC: 9.8 MG/DL — SIGNIFICANT CHANGE UP (ref 8.4–10.5)
CHLORIDE SERPL-SCNC: 102 MMOL/L — SIGNIFICANT CHANGE UP (ref 98–107)
CO2 SERPL-SCNC: 25 MMOL/L — SIGNIFICANT CHANGE UP (ref 22–31)
CREAT SERPL-MCNC: 0.94 MG/DL — SIGNIFICANT CHANGE UP (ref 0.5–1.3)
EGFR: 85 ML/MIN/1.73M2 — SIGNIFICANT CHANGE UP
EGFR: 85 ML/MIN/1.73M2 — SIGNIFICANT CHANGE UP
GLUCOSE SERPL-MCNC: 98 MG/DL — SIGNIFICANT CHANGE UP (ref 70–99)
HCT VFR BLD CALC: 41.3 % — SIGNIFICANT CHANGE UP (ref 39–50)
HGB BLD-MCNC: 13.4 G/DL — SIGNIFICANT CHANGE UP (ref 13–17)
INR BLD: 1.02 RATIO — SIGNIFICANT CHANGE UP (ref 0.85–1.16)
MAGNESIUM SERPL-MCNC: 2 MG/DL — SIGNIFICANT CHANGE UP (ref 1.6–2.6)
MCHC RBC-ENTMCNC: 29.1 PG — SIGNIFICANT CHANGE UP (ref 27–34)
MCHC RBC-ENTMCNC: 32.4 G/DL — SIGNIFICANT CHANGE UP (ref 32–36)
MCV RBC AUTO: 89.6 FL — SIGNIFICANT CHANGE UP (ref 80–100)
NON-GYNECOLOGICAL CYTOLOGY STUDY: SIGNIFICANT CHANGE UP
NRBC # BLD AUTO: 0 K/UL — SIGNIFICANT CHANGE UP (ref 0–0)
NRBC # FLD: 0 K/UL — SIGNIFICANT CHANGE UP (ref 0–0)
NRBC BLD AUTO-RTO: 0 /100 WBCS — SIGNIFICANT CHANGE UP (ref 0–0)
PHOSPHATE SERPL-MCNC: 3.7 MG/DL — SIGNIFICANT CHANGE UP (ref 2.5–4.5)
PLATELET # BLD AUTO: 224 K/UL — SIGNIFICANT CHANGE UP (ref 150–400)
PMV BLD: 11.4 FL — SIGNIFICANT CHANGE UP (ref 7–13)
POTASSIUM SERPL-MCNC: 4.1 MMOL/L — SIGNIFICANT CHANGE UP (ref 3.5–5.3)
POTASSIUM SERPL-SCNC: 4.1 MMOL/L — SIGNIFICANT CHANGE UP (ref 3.5–5.3)
PROTHROM AB SERPL-ACNC: 12.1 SEC — SIGNIFICANT CHANGE UP (ref 9.9–13.4)
RBC # BLD: 4.61 M/UL — SIGNIFICANT CHANGE UP (ref 4.2–5.8)
RBC # FLD: 13.8 % — SIGNIFICANT CHANGE UP (ref 10.3–14.5)
RH IG SCN BLD-IMP: NEGATIVE — SIGNIFICANT CHANGE UP
SODIUM SERPL-SCNC: 139 MMOL/L — SIGNIFICANT CHANGE UP (ref 135–145)
WBC # BLD: 8.65 K/UL — SIGNIFICANT CHANGE UP (ref 3.8–10.5)
WBC # FLD AUTO: 8.65 K/UL — SIGNIFICANT CHANGE UP (ref 3.8–10.5)

## 2025-06-18 PROCEDURE — 88307 TISSUE EXAM BY PATHOLOGIST: CPT | Mod: 26

## 2025-06-18 PROCEDURE — 88341 IMHCHEM/IMCYTCHM EA ADD ANTB: CPT | Mod: 26

## 2025-06-18 PROCEDURE — 88305 TISSUE EXAM BY PATHOLOGIST: CPT | Mod: 26

## 2025-06-18 PROCEDURE — 88342 IMHCHEM/IMCYTCHM 1ST ANTB: CPT | Mod: 26

## 2025-06-18 PROCEDURE — 99232 SBSQ HOSP IP/OBS MODERATE 35: CPT | Mod: GC

## 2025-06-18 PROCEDURE — 88173 CYTOPATH EVAL FNA REPORT: CPT | Mod: 26

## 2025-06-18 RX ORDER — HEPARIN SODIUM 1000 [USP'U]/ML
4500 INJECTION INTRAVENOUS; SUBCUTANEOUS EVERY 6 HOURS
Refills: 0 | Status: DISCONTINUED | OUTPATIENT
Start: 2025-06-18 | End: 2025-06-21

## 2025-06-18 RX ORDER — HEPARIN SODIUM 1000 [USP'U]/ML
2000 INJECTION INTRAVENOUS; SUBCUTANEOUS EVERY 6 HOURS
Refills: 0 | Status: DISCONTINUED | OUTPATIENT
Start: 2025-06-18 | End: 2025-06-21

## 2025-06-18 RX ORDER — HEPARIN SODIUM 1000 [USP'U]/ML
INJECTION INTRAVENOUS; SUBCUTANEOUS
Qty: 25000 | Refills: 0 | Status: DISCONTINUED | OUTPATIENT
Start: 2025-06-18 | End: 2025-06-21

## 2025-06-18 RX ADMIN — LOSARTAN POTASSIUM 25 MILLIGRAM(S): 100 TABLET, FILM COATED ORAL at 05:18

## 2025-06-18 RX ADMIN — AMLODIPINE BESYLATE 10 MILLIGRAM(S): 10 TABLET ORAL at 05:19

## 2025-06-18 RX ADMIN — POLYETHYLENE GLYCOL 3350 17 GRAM(S): 17 POWDER, FOR SOLUTION ORAL at 12:34

## 2025-06-18 NOTE — PROGRESS NOTE ADULT - ASSESSMENT
Mr. Venegas is a 73 yo M with HTN presenting after 6 weeks of ear ache and difficulty swallowing with CT showing nonocclusive thrombus R IJ at the level of thyroid gland, 6 x 6 x 7.6 cm heterogenous partially calcified soft tissue mass in the region of the R thyroid lobe, also involving the isthmus, causing mass effect on the trachea which is displaced to the left side without significant airway compromise. 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 Admit to medicine for management of RIJ thrombus and biopsy of R neck mass.

## 2025-06-18 NOTE — PROGRESS NOTE ADULT - PROBLEM SELECTOR PLAN 2
Patient with R IJ nonocclusive thrombus in setting of neck mass    - Suspect due to local mass effect and compression of distal R IJ and possible hypercoagulable state   - C/w heparin gtt (held for possible IR core bx today 6/18) -> eliquis not covered, will try xarelto vs. lovenox

## 2025-06-18 NOTE — PROGRESS NOTE ADULT - PROBLEM SELECTOR PLAN 1
Patient presenting after 6 weeks of ear ache and difficulty swallowing with outpatient sonogram showing neck mass and jugular vein clot; - S/p MBS- soft and bite sized with mod thick liquids due to laryngeal aspiration   CT showing nonocclusive thrombus R IJ at the level of thyroid gland, 6 x 6 x 7.6 cm heterogenous partially calcified soft tissue mass in the region of the R thyroid lobe, also involving the isthmus, causing mass effect on the trachea which is displaced to the left side without significant airway compromise  Thyroid labs notably WNL; TSH 4.07, T4 9.13, T3 total 107  s/p IR biopsy   CTAP for staging showing: increased L renal pole lesion, additional pancreatic cysts, R groin lymph node, lipomatous mass on L vastus medialis increased in size    - ENT following > prefer to keep inpatient to see if inpatient chemo needed if anaplastic   - F/u cytopathology-  as per ENT, 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 discussed over the phone and teams 6/17 with primary team, however no documentation. will need IR core biopsy for further eval. explained to patient, in agreement.   - Possible core thyroid biopsy with IR 6/18. heparin held

## 2025-06-18 NOTE — PROGRESS NOTE ADULT - ATTENDING COMMENTS
74M with hx of HTN p/w neck pain  found to have RIJ thrombus and R thyroid mass. On hep ggt. S/p IR biopsy. S/p CT C/A/P w/ several abnormalities. Path now back results personally reviewed and notable for malignant cells w/ Differential diagnosis of  primary(such as medullary thyroidcarcinoma, anaplastic thyroid carcinoma) or metastatic carcinoma and malignant mesenchymal neoplasm.     CT C/A/P Notable for:  1.  Left interpole 1.4 cm indeterminate renal lesion, increased in size compared to prior study.   2.  Additional pancreatic hypodense/cystic lesions.   3.Prominent right groin lymph node measuring up to 2.8 cm.  4.  Partially visualized large heterogeneous multicystic right neck lesion.  6. Incidental partially visualized left vastus medialis intramuscular lipomatous mass measuring 10.6 x 7.5 cm, increased in size compared to   prior study.     - Per team discussion w/ ENT and path results would need core biopsy to truly differentiate the lion of cancer. Core bc planned for today by IR.   - Now s/p Cinesophagram for dysphagia w/u and recs appreciated. On soft and bite size diet.   -C/w hep ggt pending ENT final plan. Will transition to oral once no further plan for interventions established   -MRI abd/ L femur ordered for noted abnormal imaging. Now s/p MRCP of the abdomen results personally reviewed and was notable for suboptimal test due to motion artifacts. No urgency to ozzie mag ing while inpatient. Pt should have f/u imaging as outpt.   -Moderate stool burden noted on imaging and pt was constipated. C/w BM regimen   -BPs intermittently not at goal. C/w amlodipine 10mg qd and losartan 25mg qd (added 6/16).     Dc planning pending thyroid core bx and results as pt may need inpt treatment.

## 2025-06-18 NOTE — PROGRESS NOTE ADULT - SUBJECTIVE AND OBJECTIVE BOX
Patient is a 74y old  Male who presents with a chief complaint of R neck mass (2025 08:14)      ======Overnight/Subjective======  Overnight- NAEON     Subjective- pt seen and examined at bedside. 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 discussed over the phone and teams , however no documentation. will need IR core biopsy for further eval. explained to patient, in agreement.     Brief daily plan- possible core biopsy with IR.     ======Medications======  MEDICATIONS  (STANDING):  amLODIPine   Tablet 10 milliGRAM(s) Oral daily  losartan 25 milliGRAM(s) Oral daily  polyethylene glycol 3350 17 Gram(s) Oral daily  senna 2 Tablet(s) Oral at bedtime    MEDICATIONS  (PRN):  acetaminophen     Tablet .. 650 milliGRAM(s) Oral every 6 hours PRN Temp greater or equal to 38C (100.4F), Mild Pain (1 - 3)  melatonin 3 milliGRAM(s) Oral at bedtime PRN Insomnia      ======Vital Signs======  T(C): 36.6 (25 @ 05:10), Max: 36.7 (25 @ 21:00)  T(F): 97.9 (25 @ 05:10), Max: 98.1 (25 @ 21:00)  HR: 65 (25 @ 05:10) (64 - 65)  BP: 163/79 (25 @ 05:10) (136/65 - 163/79)  BP(mean): --  RR: 17 (25 @ 05:10) (16 - 17)  SpO2: 98% (25 @ 05:10) (96% - 98%)    ======Physical exams======    General: NAD, non-toxic appearing   HEENT: PERRLA, EOMi, no scleral icterus. right-midline mass  CV: RRR, normal S1 and S2, no m/r/g  Lungs: normal respiratory effort. CTAB, no wheezes, rales, or rhonchi  Abd: soft, nontender, nondistended  Ext: no edema, 2+ peripheral pulses   Pysch: AAOx3, appropriate affect   Neuro: grossly non-focal, moving all extremities spontaneously   Skin: no rashes or lesions     ======Labs======                13.4  8.65 >----------< 224  (MCV: 89.6)                41.3   139 | 102 | 20  -----------------------< 98  4.1 | 25 | 0.94    TPro: 6.3 / Alb: 3.6 / TBili: 0.3 / DBili: -- / AlkPhos: 63 / ALT: 10 / AST: 15 (25 @ 06:30)  Ca: 9.8 / Phos: 3.7 / M.00 (25 @ 05:49)      PT/INR - ( 2025 05:49 )   PT: 12.1 sec;   INR: 1.02 ratio         PTT - ( 2025 05:49 )  PTT:46.8 sec    ======Microbiology======  Urinalysis Basic - ( 2025 05:49 )    Color: x / Appearance: x / SG: x / pH: x  Gluc: 98 mg/dL / Ketone: x  / Bili: x / Urobili: x   Blood: x / Protein: x / Nitrite: x   Leuk Esterase: x / RBC: x / WBC x   Sq Epi: x / Non Sq Epi: x / Bacteria: x          ======I&O's======  I&O's Summary

## 2025-06-18 NOTE — PROCEDURE NOTE - LOCAL ANESTHESIA
3 written rx will be deliver to the  this afternoon for pickup after 3p.  Francisco Latham,  For Teams Comfort and Heart  
1% Lidocaine
1% Lidocaine

## 2025-06-18 NOTE — PRE PROCEDURE NOTE - PRE PROCEDURE EVALUATION
------------------------------------------------------------  Interventional Radiology Pre-Procedure Note  ------------------------------------------------------------  Procedure:     Indication: 74y Male referred for thyroid core biopsy after FNA    Past Medical History:  HTN (hypertension)        Allergies: No Known Allergies      Medications:  amLODIPine   Tablet: 10 milliGRAM(s) Oral (06-18-25 @ 05:19)  heparin   Injectable: 2000 Unit(s) IV Push (06-17-25 @ 16:38)  heparin  Infusion.: 1300 Unit(s)/Hr IV Continuous (06-17-25 @ 23:31)  losartan: 25 milliGRAM(s) Oral (06-18-25 @ 05:18)      Vital Signs:   T(F): 97.8 (12:51), Max: 98.1 (21:00)  HR: 66 (12:51)  BP: 144/64 (12:51)  RR: 18 (12:51)  SpO2: 96% (12:51)    Labs:           13.4  8.65)-----(224     (06-18-25 @ 05:49)         41.3     139 | 102 | 20  --------------------< 98     (06-18-25 @ 05:49)  4.1 | 25 | 0.94       PT: 12.1 06-18-25 @ 05:49  aPTT: 46.8[H] 06-18-25 @ 05:49   INR: 1.02 06-18-25 @ 05:49    Imaging: reviewed    Consent: Risks, benefits, and alternatives were discussed and informed consent obtained.    Procedure Plan:   Thyroid core biopsy  The patient is a candidate for the procedure.      Francis Bundy MD  Interventional Radiology    -Available on [employer] TEAMS for all non-urgent questions  -Emergent issues: [facility]-[phone]; [facility]-[phone] ([phone])  -Non-emergent consults: Please place a [place] order "IR Consult" with an appropriate callback number  -Scheduling questions: [facility]: [phone]; [facility]: [phone]  -Clinic/Outpatient booking: [facility]: [phone]; [facility]: [phone]

## 2025-06-19 LAB
ANION GAP SERPL CALC-SCNC: 12 MMOL/L — SIGNIFICANT CHANGE UP (ref 7–14)
APTT BLD: 28.1 SEC — SIGNIFICANT CHANGE UP (ref 26.1–36.8)
APTT BLD: 28.6 SEC — SIGNIFICANT CHANGE UP (ref 26.1–36.8)
APTT BLD: 45 SEC — HIGH (ref 26.1–36.8)
BUN SERPL-MCNC: 26 MG/DL — HIGH (ref 7–23)
CALCIUM SERPL-MCNC: 9.1 MG/DL — SIGNIFICANT CHANGE UP (ref 8.4–10.5)
CHLORIDE SERPL-SCNC: 101 MMOL/L — SIGNIFICANT CHANGE UP (ref 98–107)
CO2 SERPL-SCNC: 22 MMOL/L — SIGNIFICANT CHANGE UP (ref 22–31)
CREAT SERPL-MCNC: 0.98 MG/DL — SIGNIFICANT CHANGE UP (ref 0.5–1.3)
EGFR: 81 ML/MIN/1.73M2 — SIGNIFICANT CHANGE UP
EGFR: 81 ML/MIN/1.73M2 — SIGNIFICANT CHANGE UP
GLUCOSE SERPL-MCNC: 94 MG/DL — SIGNIFICANT CHANGE UP (ref 70–99)
HCT VFR BLD CALC: 39.5 % — SIGNIFICANT CHANGE UP (ref 39–50)
HCT VFR BLD CALC: 40.2 % — SIGNIFICANT CHANGE UP (ref 39–50)
HGB BLD-MCNC: 12.8 G/DL — LOW (ref 13–17)
HGB BLD-MCNC: 12.9 G/DL — LOW (ref 13–17)
MAGNESIUM SERPL-MCNC: 2 MG/DL — SIGNIFICANT CHANGE UP (ref 1.6–2.6)
MCHC RBC-ENTMCNC: 28.8 PG — SIGNIFICANT CHANGE UP (ref 27–34)
MCHC RBC-ENTMCNC: 29.2 PG — SIGNIFICANT CHANGE UP (ref 27–34)
MCHC RBC-ENTMCNC: 32.1 G/DL — SIGNIFICANT CHANGE UP (ref 32–36)
MCHC RBC-ENTMCNC: 32.4 G/DL — SIGNIFICANT CHANGE UP (ref 32–36)
MCV RBC AUTO: 89.7 FL — SIGNIFICANT CHANGE UP (ref 80–100)
MCV RBC AUTO: 90 FL — SIGNIFICANT CHANGE UP (ref 80–100)
NRBC # BLD AUTO: 0 K/UL — SIGNIFICANT CHANGE UP (ref 0–0)
NRBC # BLD AUTO: 0 K/UL — SIGNIFICANT CHANGE UP (ref 0–0)
NRBC # FLD: 0 K/UL — SIGNIFICANT CHANGE UP (ref 0–0)
NRBC # FLD: 0 K/UL — SIGNIFICANT CHANGE UP (ref 0–0)
NRBC BLD AUTO-RTO: 0 /100 WBCS — SIGNIFICANT CHANGE UP (ref 0–0)
NRBC BLD AUTO-RTO: 0 /100 WBCS — SIGNIFICANT CHANGE UP (ref 0–0)
PHOSPHATE SERPL-MCNC: 3.4 MG/DL — SIGNIFICANT CHANGE UP (ref 2.5–4.5)
PLATELET # BLD AUTO: 218 K/UL — SIGNIFICANT CHANGE UP (ref 150–400)
PLATELET # BLD AUTO: 228 K/UL — SIGNIFICANT CHANGE UP (ref 150–400)
PMV BLD: 11.1 FL — SIGNIFICANT CHANGE UP (ref 7–13)
PMV BLD: 11.9 FL — SIGNIFICANT CHANGE UP (ref 7–13)
POTASSIUM SERPL-MCNC: 4.4 MMOL/L — SIGNIFICANT CHANGE UP (ref 3.5–5.3)
POTASSIUM SERPL-SCNC: 4.4 MMOL/L — SIGNIFICANT CHANGE UP (ref 3.5–5.3)
RBC # BLD: 4.39 M/UL — SIGNIFICANT CHANGE UP (ref 4.2–5.8)
RBC # BLD: 4.48 M/UL — SIGNIFICANT CHANGE UP (ref 4.2–5.8)
RBC # FLD: 13.9 % — SIGNIFICANT CHANGE UP (ref 10.3–14.5)
RBC # FLD: 14 % — SIGNIFICANT CHANGE UP (ref 10.3–14.5)
SODIUM SERPL-SCNC: 135 MMOL/L — SIGNIFICANT CHANGE UP (ref 135–145)
WBC # BLD: 9.57 K/UL — SIGNIFICANT CHANGE UP (ref 3.8–10.5)
WBC # BLD: 9.85 K/UL — SIGNIFICANT CHANGE UP (ref 3.8–10.5)
WBC # FLD AUTO: 9.57 K/UL — SIGNIFICANT CHANGE UP (ref 3.8–10.5)
WBC # FLD AUTO: 9.85 K/UL — SIGNIFICANT CHANGE UP (ref 3.8–10.5)

## 2025-06-19 PROCEDURE — 99232 SBSQ HOSP IP/OBS MODERATE 35: CPT | Mod: GC

## 2025-06-19 PROCEDURE — 73719 MRI LOWER EXTREMITY W/DYE: CPT | Mod: 26,LT

## 2025-06-19 RX ORDER — DEXTROMETHORPHAN HBR, GUAIFENESIN 200 MG/10ML
200 LIQUID ORAL EVERY 6 HOURS
Refills: 0 | Status: DISCONTINUED | OUTPATIENT
Start: 2025-06-19 | End: 2025-07-01

## 2025-06-19 RX ADMIN — HEPARIN SODIUM 4500 UNIT(S): 1000 INJECTION INTRAVENOUS; SUBCUTANEOUS at 17:51

## 2025-06-19 RX ADMIN — AMLODIPINE BESYLATE 10 MILLIGRAM(S): 10 TABLET ORAL at 05:54

## 2025-06-19 RX ADMIN — HEPARIN SODIUM 1100 UNIT(S)/HR: 1000 INJECTION INTRAVENOUS; SUBCUTANEOUS at 00:14

## 2025-06-19 RX ADMIN — HEPARIN SODIUM 1400 UNIT(S)/HR: 1000 INJECTION INTRAVENOUS; SUBCUTANEOUS at 17:36

## 2025-06-19 RX ADMIN — DEXTROMETHORPHAN HBR, GUAIFENESIN 200 MILLIGRAM(S): 200 LIQUID ORAL at 00:58

## 2025-06-19 RX ADMIN — HEPARIN SODIUM 1200 UNIT(S)/HR: 1000 INJECTION INTRAVENOUS; SUBCUTANEOUS at 08:33

## 2025-06-19 RX ADMIN — HEPARIN SODIUM 1100 UNIT(S)/HR: 1000 INJECTION INTRAVENOUS; SUBCUTANEOUS at 07:42

## 2025-06-19 RX ADMIN — HEPARIN SODIUM 2000 UNIT(S): 1000 INJECTION INTRAVENOUS; SUBCUTANEOUS at 08:53

## 2025-06-19 RX ADMIN — HEPARIN SODIUM 1400 UNIT(S)/HR: 1000 INJECTION INTRAVENOUS; SUBCUTANEOUS at 19:22

## 2025-06-19 RX ADMIN — LOSARTAN POTASSIUM 25 MILLIGRAM(S): 100 TABLET, FILM COATED ORAL at 05:53

## 2025-06-19 NOTE — PROGRESS NOTE ADULT - PROBLEM SELECTOR PLAN 1
Patient presenting after 6 weeks of ear ache and difficulty swallowing with outpatient sonogram showing neck mass and jugular vein clot; - S/p MBS- soft and bite sized with mod thick liquids due to laryngeal aspiration   CT showing nonocclusive thrombus R IJ at the level of thyroid gland, 6 x 6 x 7.6 cm heterogenous partially calcified soft tissue mass in the region of the R thyroid lobe, also involving the isthmus, causing mass effect on the trachea which is displaced to the left side without significant airway compromise  Thyroid labs notably WNL; TSH 4.07, T4 9.13, T3 total 107  s/p IR biopsy   CTAP for staging showing: increased L renal pole lesion, additional pancreatic cysts, R groin lymph node, lipomatous mass on L vastus medialis increased in size  Cytopath: Positive for malignancy. Differential diagnosis includes primary(such as medullary thyroid carcinoma, anaplastic thyroid carcinoma) or metastatic carcinoma and malignant mesenchymal neoplasm. No cell-block available to perform further ancillary studies. Repeat sampling with tissue examination recommended for accurate  categorization of this neoplasm   S/p core thyroid bx with IR 6/18     - F/u pathology  - ENT following > prefer to keep inpatient to see if inpatient chemo needed if anaplastic

## 2025-06-19 NOTE — PROGRESS NOTE ADULT - ASSESSMENT
Mr. Venegas is a 75 yo M with HTN presenting after 6 weeks of ear ache and difficulty swallowing with CT showing nonocclusive thrombus R IJ at the level of thyroid gland, 6 x 6 x 7.6 cm heterogenous partially calcified soft tissue mass in the region of the R thyroid lobe, also involving the isthmus, causing mass effect on the trachea which is displaced to the left side without significant airway compromise. 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 Admit to medicine for management of RIJ thrombus and biopsy of R neck mass.

## 2025-06-19 NOTE — PROGRESS NOTE ADULT - PROBLEM SELECTOR PLAN 2
Patient with R IJ nonocclusive thrombus in setting of neck mass    - Suspect due to local mass effect and compression of distal R IJ and possible hypercoagulable state   - C/w heparin gtt (xarelto or lovenox on dc, eliquis not covered)

## 2025-06-19 NOTE — PROGRESS NOTE ADULT - SUBJECTIVE AND OBJECTIVE BOX
Patient is a 74y old  Male who presents with a chief complaint of R neck mass (2025 08:07)      ======Overnight/Subjective======  Overnight- S/p core thyroid bx w IR, restarted heparin gtt overnight     Subjective- pt seen and examined at bedside. no acute concerns.     Brief daily plan- await pathology, ENT reccs     ======Medications======  MEDICATIONS  (STANDING):  amLODIPine   Tablet 10 milliGRAM(s) Oral daily  heparin  Infusion.  Unit(s)/Hr (11 mL/Hr) IV Continuous <Continuous>  losartan 25 milliGRAM(s) Oral daily  polyethylene glycol 3350 17 Gram(s) Oral daily  senna 2 Tablet(s) Oral at bedtime    MEDICATIONS  (PRN):  acetaminophen     Tablet .. 650 milliGRAM(s) Oral every 6 hours PRN Temp greater or equal to 38C (100.4F), Mild Pain (1 - 3)  guaiFENesin Oral Liquid (Sugar-Free) 200 milliGRAM(s) Oral every 6 hours PRN Cough  heparin   Injectable 4500 Unit(s) IV Push every 6 hours PRN For aPTT less than 40  heparin   Injectable 2000 Unit(s) IV Push every 6 hours PRN For aPTT between 40 - 57  melatonin 3 milliGRAM(s) Oral at bedtime PRN Insomnia      ======Vital Signs======  T(C): 36.4 (25 @ 05:09), Max: 36.9 (25 @ 21:38)  T(F): 97.5 (25 @ 05:09), Max: 98.4 (25 @ 21:38)  HR: 62 (25 @ 05:09) (62 - 74)  BP: 162/76 (25 @ 05:09) (144/64 - 162/76)  BP(mean): --  RR: 17 (25 @ 05:09) (17 - 18)  SpO2: 95% (25 @ 05:09) (95% - 98%)    ======Physical exams======    General: NAD, non-toxic appearing   HEENT: PERRLA, EOMi, no scleral icterus. right-midline mass  CV: RRR, normal S1 and S2, no m/r/g  Lungs: normal respiratory effort. CTAB, no wheezes, rales, or rhonchi  Abd: soft, nontender, nondistended  Ext: no edema, 2+ peripheral pulses   Pysch: AAOx3, appropriate affect   Neuro: grossly non-focal, moving all extremities spontaneously   Skin: no rashes or lesions     ======Labs======                13.4  8.65 >----------< 224  (MCV: 89.6)                41.3   139 | 102 | 20  -----------------------< 98  4.1 | 25 | 0.94    TPro: 6.3 / Alb: 3.6 / TBili: 0.3 / DBili: -- / AlkPhos: 63 / ALT: 10 / AST: 15 (25 @ 06:30)  Ca: 9.8 / Phos: 3.7 / M.00 (25 @ 05:49)      PT/INR - ( 2025 05:49 )   PT: 12.1 sec;   INR: 1.02 ratio         PTT - ( 2025 23:55 )  PTT:28.1 sec    ======Microbiology======  Urinalysis Basic - ( 2025 05:49 )    Color: x / Appearance: x / SG: x / pH: x  Gluc: 98 mg/dL / Ketone: x  / Bili: x / Urobili: x   Blood: x / Protein: x / Nitrite: x   Leuk Esterase: x / RBC: x / WBC x   Sq Epi: x / Non Sq Epi: x / Bacteria: x          ======I&O's======  I&O's Summary

## 2025-06-19 NOTE — PROGRESS NOTE ADULT - ATTENDING COMMENTS
74M with hx of HTN p/w neck pain  found to have RIJ thrombus and R thyroid mass. On hep ggt. S/p IR biopsy. S/p CT C/A/P w/ several abnormalities. Path now back results personally reviewed and notable for malignant cells w/ Differential diagnosis of  primary(such as medullary thyroid carcinoma, anaplastic thyroid carcinoma) or metastatic carcinoma and malignant mesenchymal neoplasm. Pt now s/p core bx on 6/18 to differentiate the type of cancer    CT C/A/P Notable for:  1.  Left interpole 1.4 cm indeterminate renal lesion, increased in size compared to prior study.   2.  Additional pancreatic hypodense/cystic lesions.   3.Prominent right groin lymph node measuring up to 2.8 cm.  4.  Partially visualized large heterogeneous multicystic right neck lesion.  6. Incidental partially visualized left vastus medialis intramuscular lipomatous mass measuring 10.6 x 7.5 cm, increased in size compared to   prior study.     - F/u path from Core bx   - Now s/p Cinesophagram for dysphagia w/u and recs appreciated. On soft and bite size diet.   -On hep ggt pending ENT final plan. Will transition to oral once no further plan for interventions established   -MRI abd/ L femur ordered for noted abnormal imaging. Now s/p MRCP of the abdomen results personally reviewed and was notable for suboptimal test due to motion artifacts. No urgency to ozzie mag ing while inpatient. Pt should have f/u imaging as outpt.   -Moderate stool burden noted on imaging and pt was constipated. C/w BM regimen   -BPs intermittently not at goal. C/w amlodipine 10mg qd and losartan 25mg qd (added 6/16).     Dc planning pending thyroid core bx results

## 2025-06-20 LAB
ANION GAP SERPL CALC-SCNC: 14 MMOL/L — SIGNIFICANT CHANGE UP (ref 7–14)
APTT BLD: 62.4 SEC — HIGH (ref 26.1–36.8)
APTT BLD: 78.3 SEC — HIGH (ref 26.1–36.8)
APTT BLD: 99.5 SEC — HIGH (ref 26.1–36.8)
BUN SERPL-MCNC: 29 MG/DL — HIGH (ref 7–23)
CALCIUM SERPL-MCNC: 9.4 MG/DL — SIGNIFICANT CHANGE UP (ref 8.4–10.5)
CHLORIDE SERPL-SCNC: 100 MMOL/L — SIGNIFICANT CHANGE UP (ref 98–107)
CO2 SERPL-SCNC: 23 MMOL/L — SIGNIFICANT CHANGE UP (ref 22–31)
CREAT SERPL-MCNC: 1 MG/DL — SIGNIFICANT CHANGE UP (ref 0.5–1.3)
EGFR: 79 ML/MIN/1.73M2 — SIGNIFICANT CHANGE UP
EGFR: 79 ML/MIN/1.73M2 — SIGNIFICANT CHANGE UP
GLUCOSE SERPL-MCNC: 96 MG/DL — SIGNIFICANT CHANGE UP (ref 70–99)
HCT VFR BLD CALC: 38.3 % — LOW (ref 39–50)
HGB BLD-MCNC: 12.8 G/DL — LOW (ref 13–17)
MAGNESIUM SERPL-MCNC: 2.1 MG/DL — SIGNIFICANT CHANGE UP (ref 1.6–2.6)
MCHC RBC-ENTMCNC: 29.7 PG — SIGNIFICANT CHANGE UP (ref 27–34)
MCHC RBC-ENTMCNC: 33.4 G/DL — SIGNIFICANT CHANGE UP (ref 32–36)
MCV RBC AUTO: 88.9 FL — SIGNIFICANT CHANGE UP (ref 80–100)
NRBC # BLD AUTO: 0 K/UL — SIGNIFICANT CHANGE UP (ref 0–0)
NRBC # FLD: 0 K/UL — SIGNIFICANT CHANGE UP (ref 0–0)
NRBC BLD AUTO-RTO: 0 /100 WBCS — SIGNIFICANT CHANGE UP (ref 0–0)
PHOSPHATE SERPL-MCNC: 3.5 MG/DL — SIGNIFICANT CHANGE UP (ref 2.5–4.5)
PLATELET # BLD AUTO: 245 K/UL — SIGNIFICANT CHANGE UP (ref 150–400)
PMV BLD: 11.7 FL — SIGNIFICANT CHANGE UP (ref 7–13)
POTASSIUM SERPL-MCNC: 4.2 MMOL/L — SIGNIFICANT CHANGE UP (ref 3.5–5.3)
POTASSIUM SERPL-SCNC: 4.2 MMOL/L — SIGNIFICANT CHANGE UP (ref 3.5–5.3)
RBC # BLD: 4.31 M/UL — SIGNIFICANT CHANGE UP (ref 4.2–5.8)
RBC # FLD: 13.8 % — SIGNIFICANT CHANGE UP (ref 10.3–14.5)
SODIUM SERPL-SCNC: 137 MMOL/L — SIGNIFICANT CHANGE UP (ref 135–145)
WBC # BLD: 9.66 K/UL — SIGNIFICANT CHANGE UP (ref 3.8–10.5)
WBC # FLD AUTO: 9.66 K/UL — SIGNIFICANT CHANGE UP (ref 3.8–10.5)

## 2025-06-20 PROCEDURE — 99232 SBSQ HOSP IP/OBS MODERATE 35: CPT | Mod: GC

## 2025-06-20 RX ADMIN — HEPARIN SODIUM UNIT(S)/HR: 1000 INJECTION INTRAVENOUS; SUBCUTANEOUS at 09:12

## 2025-06-20 RX ADMIN — AMLODIPINE BESYLATE 10 MILLIGRAM(S): 10 TABLET ORAL at 05:48

## 2025-06-20 RX ADMIN — HEPARIN SODIUM UNIT(S)/HR: 1000 INJECTION INTRAVENOUS; SUBCUTANEOUS at 17:19

## 2025-06-20 RX ADMIN — HEPARIN SODIUM UNIT(S)/HR: 1000 INJECTION INTRAVENOUS; SUBCUTANEOUS at 15:55

## 2025-06-20 RX ADMIN — LOSARTAN POTASSIUM 25 MILLIGRAM(S): 100 TABLET, FILM COATED ORAL at 05:48

## 2025-06-20 RX ADMIN — DEXTROMETHORPHAN HBR, GUAIFENESIN 200 MILLIGRAM(S): 200 LIQUID ORAL at 15:17

## 2025-06-20 RX ADMIN — HEPARIN SODIUM UNIT(S)/HR: 1000 INJECTION INTRAVENOUS; SUBCUTANEOUS at 07:27

## 2025-06-20 RX ADMIN — HEPARIN SODIUM UNIT(S)/HR: 1000 INJECTION INTRAVENOUS; SUBCUTANEOUS at 19:41

## 2025-06-20 RX ADMIN — HEPARIN SODIUM UNIT(S)/HR: 1000 INJECTION INTRAVENOUS; SUBCUTANEOUS at 01:47

## 2025-06-20 NOTE — PROGRESS NOTE ADULT - ATTENDING COMMENTS
74M with hx of HTN p/w neck pain  found to have RIJ thrombus and R thyroid mass. On hep ggt. S/p IR biopsy. S/p CT C/A/P w/ several abnormalities. Path now back results personally reviewed and notable for malignant cells w/ Differential diagnosis of  primary(such as medullary thyroid carcinoma, anaplastic thyroid carcinoma) or metastatic carcinoma and malignant mesenchymal neoplasm. Pt now s/p core bx on 6/18 to differentiate the type of cancer    CT C/A/P Notable for:  1.  Left interpole 1.4 cm indeterminate renal lesion, increased in size compared to prior study.   2.  Additional pancreatic hypodense/cystic lesions.   3.Prominent right groin lymph node measuring up to 2.8 cm.  4.  Partially visualized large heterogeneous multicystic right neck lesion.  6. Incidental partially visualized left vastus medialis intramuscular lipomatous mass measuring 10.6 x 7.5 cm, increased in size compared to   prior study.     - F/u path from Core bx   - Now s/p Cinesophagram for dysphagia w/u and recs appreciated. On soft and bite size diet.   -On hep ggt pending ENT final plan. Will transition to oral once no further plan for interventions established   -MRI abd/ L femur ordered for noted abnormal imaging. Now s/p MRI femur results personally reviewed and findings c/w lipoma. MRCP was notable for suboptimal test due to motion artifacts. No urgency to ozzie mag ing while inpatient. Pt should have f/u imaging as outpt.   -Moderate stool burden noted on imaging and pt was constipated. Constipation overall improved. C/w BM regimen   -BPs intermittently not at goal. C/w amlodipine 10mg qd and losartan 25mg qd (added 6/16).     Dc planning pending thyroid core bx results

## 2025-06-20 NOTE — PROGRESS NOTE ADULT - SUBJECTIVE AND OBJECTIVE BOX
Patient is a 74y old  Male who presents with a chief complaint of R neck mass (2025 08:28)      ======Overnight/Subjective======  Overnight- NAEON    Subjective- pt seen and examined at bedside. no acute concerns.     Brief daily plan- waiting for path     ======Medications======  MEDICATIONS  (STANDING):  amLODIPine   Tablet 10 milliGRAM(s) Oral daily  heparin  Infusion.  Unit(s)/Hr (11 mL/Hr) IV Continuous <Continuous>  losartan 25 milliGRAM(s) Oral daily  polyethylene glycol 3350 17 Gram(s) Oral daily  senna 2 Tablet(s) Oral at bedtime    MEDICATIONS  (PRN):  acetaminophen     Tablet .. 650 milliGRAM(s) Oral every 6 hours PRN Temp greater or equal to 38C (100.4F), Mild Pain (1 - 3)  guaiFENesin Oral Liquid (Sugar-Free) 200 milliGRAM(s) Oral every 6 hours PRN Cough  heparin   Injectable 4500 Unit(s) IV Push every 6 hours PRN For aPTT less than 40  heparin   Injectable 2000 Unit(s) IV Push every 6 hours PRN For aPTT between 40 - 57  melatonin 3 milliGRAM(s) Oral at bedtime PRN Insomnia      ======Vital Signs======  T(C): 36.6 (25 @ 05:29), Max: 36.7 (25 @ 12:20)  T(F): 97.9 (25 @ 05:29), Max: 98 (25 @ 12:20)  HR: 61 (25 @ 05:29) (61 - 64)  BP: 167/79 (25 @ 05:29) (128/61 - 167/79)  BP(mean): --  RR: 18 (25 @ 05:29) (18 - 19)  SpO2: 96% (25 @ 05:29) (96% - 97%)    ======Physical exams======    General: NAD, non-toxic appearing   HEENT: PERRLA, EOMi, no scleral icterus. right-midline mass  CV: RRR, normal S1 and S2, no m/r/g  Lungs: normal respiratory effort. CTAB, no wheezes, rales, or rhonchi  Abd: soft, nontender, nondistended  Ext: no edema, 2+ peripheral pulses   Pysch: AAOx3, appropriate affect   Neuro: grossly non-focal, moving all extremities spontaneously   Skin: no rashes or lesions     ======Labs======                12.8[L]  9.66 >----------< 245  (MCV: 88.9)                38.3[L]   137 | 100 | 29[H]  -----------------------< 96  4.2 | 23 | 1.00    TPro: 6.3 / Alb: 3.6 / TBili: 0.3 / DBili: -- / AlkPhos: 63 / ALT: 10 / AST: 15 (25 @ 06:30)  Ca: 9.4 / Phos: 3.5 / M.10 (25 @ 06:54)      PTT - ( 2025 07:50 )  PTT:62.4 sec    ======Microbiology======  Urinalysis Basic - ( 2025 06:54 )    Color: x / Appearance: x / SG: x / pH: x  Gluc: 96 mg/dL / Ketone: x  / Bili: x / Urobili: x   Blood: x / Protein: x / Nitrite: x   Leuk Esterase: x / RBC: x / WBC x   Sq Epi: x / Non Sq Epi: x / Bacteria: x          ======I&O's======  I&O's Summary

## 2025-06-21 LAB — APTT BLD: 61.4 SEC — HIGH (ref 26.1–36.8)

## 2025-06-21 PROCEDURE — 99232 SBSQ HOSP IP/OBS MODERATE 35: CPT | Mod: GC

## 2025-06-21 RX ORDER — ENOXAPARIN SODIUM 100 MG/ML
60 INJECTION SUBCUTANEOUS EVERY 12 HOURS
Refills: 0 | Status: DISCONTINUED | OUTPATIENT
Start: 2025-06-21 | End: 2025-07-01

## 2025-06-21 RX ORDER — LOSARTAN POTASSIUM 100 MG/1
50 TABLET, FILM COATED ORAL DAILY
Refills: 0 | Status: DISCONTINUED | OUTPATIENT
Start: 2025-06-22 | End: 2025-07-01

## 2025-06-21 RX ORDER — ACETAMINOPHEN 500 MG/5ML
1000 LIQUID (ML) ORAL ONCE
Refills: 0 | Status: COMPLETED | OUTPATIENT
Start: 2025-06-21 | End: 2025-06-21

## 2025-06-21 RX ORDER — DEXTROMETHORPHAN HBR, GUAIFENESIN 200 MG/10ML
600 LIQUID ORAL ONCE
Refills: 0 | Status: COMPLETED | OUTPATIENT
Start: 2025-06-21 | End: 2025-06-21

## 2025-06-21 RX ADMIN — AMLODIPINE BESYLATE 10 MILLIGRAM(S): 10 TABLET ORAL at 06:18

## 2025-06-21 RX ADMIN — ENOXAPARIN SODIUM 60 MILLIGRAM(S): 100 INJECTION SUBCUTANEOUS at 18:00

## 2025-06-21 RX ADMIN — LOSARTAN POTASSIUM 25 MILLIGRAM(S): 100 TABLET, FILM COATED ORAL at 06:18

## 2025-06-21 RX ADMIN — Medication 1000 MILLIGRAM(S): at 10:23

## 2025-06-21 RX ADMIN — DEXTROMETHORPHAN HBR, GUAIFENESIN 600 MILLIGRAM(S): 200 LIQUID ORAL at 10:23

## 2025-06-21 RX ADMIN — Medication 1000 MILLIGRAM(S): at 11:23

## 2025-06-21 RX ADMIN — Medication 2 TABLET(S): at 21:47

## 2025-06-21 RX ADMIN — POLYETHYLENE GLYCOL 3350 17 GRAM(S): 17 POWDER, FOR SOLUTION ORAL at 12:18

## 2025-06-21 RX ADMIN — HEPARIN SODIUM UNIT(S)/HR: 1000 INJECTION INTRAVENOUS; SUBCUTANEOUS at 08:58

## 2025-06-21 RX ADMIN — HEPARIN SODIUM UNIT(S)/HR: 1000 INJECTION INTRAVENOUS; SUBCUTANEOUS at 10:32

## 2025-06-21 RX ADMIN — HEPARIN SODIUM UNIT(S)/HR: 1000 INJECTION INTRAVENOUS; SUBCUTANEOUS at 07:44

## 2025-06-21 NOTE — PROGRESS NOTE ADULT - PROBLEM SELECTOR PROBLEM 3
After Visit Summary   10/18/2018    Ron Barnett    MRN: [id number]           Patient Information     Date Of Birth          1974        Visit Information        Provider Department      10/18/2018 3:00 PM LV RN [facility]        Today's Diagnoses     Screening examination for pulmonary tuberculosis    -  1       Follow-ups after your visit        Who to contact     If you have questions or need follow up information about today's clinic visit or your schedule please contact [facility] directly at [phone].  Normal or non-critical lab and imaging results will be communicated to you by MyChart, letter or phone within 4 business days after the clinic has received the results. If you do not hear from us within 7 days, please contact the clinic through [employer] or phone. If you have a critical or abnormal lab result, we will notify you by phone as soon as possible.  Submit refill requests through [employer] or call your pharmacy and they will forward the refill request to us. Please allow 3 business days for your refill to be completed.          Additional Information About Your Visit        MyChart Information     [employer] gives you secure access to your electronic health record. If you see a primary care provider, you can also send messages to your care team and make appointments. If you have questions, please call your primary care clinic.  If you do not have a primary care provider, please call [phone] and they will assist you.        Care EveryWhere ID     This is your Care EveryWhere ID. This could be used by other organizations to access your [facility] medical records  [id number]         Blood Pressure from Last 3 Encounters:   04/20/18 124/79   04/13/18 110/70   01/22/18 108/70    Weight from Last 3 Encounters:   04/13/18 191 lb 4.8 oz (86.8 kg)   01/22/18 184 lb 4.8 oz (83.6 kg)   12/27/17 189 lb (85.7 kg)              Today, you had the following     No  orders found for display       Primary Care Provider Office Phone # Fax #    Davian Oseguera [phone] [address]  [facility] [address]        Equal Access to Services     SHELIA GANDARA : Hadii dorcas leung hadfinesseo Soomaali, waaxda luqadaha, qaybta kaalmada adeegyada, sully jamesin hayaataty kesslerindira boyd anastasiia aquino. So [facility] [phone].    ATENCIÓN: Si habla español, tiene a elizondo disposición servicios gratuitos de asistencia lingüística. Llame al [phone].    We comply with applicable federal civil rights laws and [place] laws. We do not discriminate on the basis of race, color, national origin, age, disability, sex, sexual orientation, or gender identity.            Thank you!     Thank you for choosing [facility]  for your care. Our goal is always to provide you with excellent care. Hearing back from our patients is one way we can continue to improve our services. Please take a few minutes to complete the written survey that you may receive in the mail after your visit with us. Thank you!             Your Updated Medication List - Protect others around you: Learn how to safely use, store and throw away your medicines at www.disposemymeds.org.          This list is accurate as of 10/18/18  3:04 PM.  Always use your most recent med list.                   Brand Name Dispense Instructions for use Diagnosis    levothyroxine 112 MCG tablet    SYNTHROID/LEVOTHROID    90 tablet    Take 1 tablet (112 mcg) by mouth daily    Hypothyroidism, unspecified type          LITZY (acute kidney injury)

## 2025-06-21 NOTE — PROGRESS NOTE ADULT - ATTENDING COMMENTS
Presented with neck pain, found to have R thyroid mass and RIJ thrombus  S/P biopsy, pending path  On heparin gtt, will transition to therapeutic lovenox   BP control  Patient states that the waiting is making him anxious, but he agrees with plan to follow up pathology while inpatient   ENT/Onc f/u pending path

## 2025-06-21 NOTE — PROGRESS NOTE ADULT - SUBJECTIVE AND OBJECTIVE BOX
Patient is a 74y old  Male who presents with a chief complaint of R neck mass (2025 08:28)      ======Overnight/Subjective======  Overnight- NAEON    Subjective- pt seen and examined at bedside. no acute concerns.     Brief daily plan- awaiting pathology.    ======Medications======  MEDICATIONS  (STANDING):  acetaminophen     Tablet .. 1000 milliGRAM(s) Oral once  amLODIPine   Tablet 10 milliGRAM(s) Oral daily  guaiFENesin  milliGRAM(s) Oral once  heparin  Infusion.  Unit(s)/Hr (11 mL/Hr) IV Continuous <Continuous>  losartan 25 milliGRAM(s) Oral daily  polyethylene glycol 3350 17 Gram(s) Oral daily  senna 2 Tablet(s) Oral at bedtime    MEDICATIONS  (PRN):  acetaminophen     Tablet .. 650 milliGRAM(s) Oral every 6 hours PRN Temp greater or equal to 38C (100.4F), Mild Pain (1 - 3)  guaiFENesin Oral Liquid (Sugar-Free) 200 milliGRAM(s) Oral every 6 hours PRN Cough  heparin   Injectable 4500 Unit(s) IV Push every 6 hours PRN For aPTT less than 40  heparin   Injectable 2000 Unit(s) IV Push every 6 hours PRN For aPTT between 40 - 57  melatonin 3 milliGRAM(s) Oral at bedtime PRN Insomnia      ======Vital Signs======  T(C): 36.3 (25 @ 05:29), Max: 36.6 (25 @ 14:32)  T(F): 97.4 (25 @ 05:29), Max: 97.9 (25 @ 14:32)  HR: 63 (25 @ 05:29) (60 - 64)  BP: 151/83 (25 @ 05:29) (151/82 - 167/71)  BP(mean): --  RR: 18 (25 @ 05:29) (16 - 18)  SpO2: 98% (25 @ 05:29) (96% - 98%)    ======Physical exams======    General: NAD, non-toxic appearing   HEENT: PERRLA, EOMi, no scleral icterus. right-midline mass  CV: RRR, normal S1 and S2, no m/r/g  Lungs: normal respiratory effort. CTAB, no wheezes, rales, or rhonchi  Abd: soft, nontender, nondistended  Ext: no edema, 2+ peripheral pulses   Pysch: AAOx3, appropriate affect   Neuro: grossly non-focal, moving all extremities spontaneously   Skin: no rashes or lesions     ======Labs======                12.8[L]  9.66 >----------< 245  (MCV: 88.9)                38.3[L]   137 | 100 | 29[H]  -----------------------< 96  4.2 | 23 | 1.00    TPro: 6.3 / Alb: 3.6 / TBili: 0.3 / DBili: -- / AlkPhos: 63 / ALT: 10 / AST: 15 (25 @ 06:30)  Ca: 9.4 / Phos: 3.5 / M.10 (25 @ 06:54)      PTT - ( 2025 05:40 )  PTT:61.4 sec    ======Microbiology======  Urinalysis Basic - ( 2025 06:54 )    Color: x / Appearance: x / SG: x / pH: x  Gluc: 96 mg/dL / Ketone: x  / Bili: x / Urobili: x   Blood: x / Protein: x / Nitrite: x   Leuk Esterase: x / RBC: x / WBC x   Sq Epi: x / Non Sq Epi: x / Bacteria: x          ======I&O's======  I&O's Summary    2025 07:01  -  2025 07:00  --------------------------------------------------------  IN: 0 mL / OUT: 200 mL / NET: -200 mL

## 2025-06-22 LAB
ANION GAP SERPL CALC-SCNC: 10 MMOL/L — SIGNIFICANT CHANGE UP (ref 7–14)
APTT BLD: 30.5 SEC — SIGNIFICANT CHANGE UP (ref 26.1–36.8)
BUN SERPL-MCNC: 34 MG/DL — HIGH (ref 7–23)
CALCIUM SERPL-MCNC: 9.3 MG/DL — SIGNIFICANT CHANGE UP (ref 8.4–10.5)
CHLORIDE SERPL-SCNC: 103 MMOL/L — SIGNIFICANT CHANGE UP (ref 98–107)
CO2 SERPL-SCNC: 25 MMOL/L — SIGNIFICANT CHANGE UP (ref 22–31)
CREAT SERPL-MCNC: 1.13 MG/DL — SIGNIFICANT CHANGE UP (ref 0.5–1.3)
EGFR: 68 ML/MIN/1.73M2 — SIGNIFICANT CHANGE UP
EGFR: 68 ML/MIN/1.73M2 — SIGNIFICANT CHANGE UP
GLUCOSE SERPL-MCNC: 103 MG/DL — HIGH (ref 70–99)
HCT VFR BLD CALC: 36.7 % — LOW (ref 39–50)
HGB BLD-MCNC: 12 G/DL — LOW (ref 13–17)
MAGNESIUM SERPL-MCNC: 2.3 MG/DL — SIGNIFICANT CHANGE UP (ref 1.6–2.6)
MCHC RBC-ENTMCNC: 29.9 PG — SIGNIFICANT CHANGE UP (ref 27–34)
MCHC RBC-ENTMCNC: 32.7 G/DL — SIGNIFICANT CHANGE UP (ref 32–36)
MCV RBC AUTO: 91.3 FL — SIGNIFICANT CHANGE UP (ref 80–100)
NRBC # BLD AUTO: 0 K/UL — SIGNIFICANT CHANGE UP (ref 0–0)
NRBC # FLD: 0 K/UL — SIGNIFICANT CHANGE UP (ref 0–0)
NRBC BLD AUTO-RTO: 0 /100 WBCS — SIGNIFICANT CHANGE UP (ref 0–0)
PHOSPHATE SERPL-MCNC: 3.8 MG/DL — SIGNIFICANT CHANGE UP (ref 2.5–4.5)
PLATELET # BLD AUTO: 237 K/UL — SIGNIFICANT CHANGE UP (ref 150–400)
PMV BLD: 11 FL — SIGNIFICANT CHANGE UP (ref 7–13)
POTASSIUM SERPL-MCNC: 4.3 MMOL/L — SIGNIFICANT CHANGE UP (ref 3.5–5.3)
POTASSIUM SERPL-SCNC: 4.3 MMOL/L — SIGNIFICANT CHANGE UP (ref 3.5–5.3)
RBC # BLD: 4.02 M/UL — LOW (ref 4.2–5.8)
RBC # FLD: 13.7 % — SIGNIFICANT CHANGE UP (ref 10.3–14.5)
SODIUM SERPL-SCNC: 138 MMOL/L — SIGNIFICANT CHANGE UP (ref 135–145)
WBC # BLD: 7.72 K/UL — SIGNIFICANT CHANGE UP (ref 3.8–10.5)
WBC # FLD AUTO: 7.72 K/UL — SIGNIFICANT CHANGE UP (ref 3.8–10.5)

## 2025-06-22 PROCEDURE — 99232 SBSQ HOSP IP/OBS MODERATE 35: CPT | Mod: GC

## 2025-06-22 RX ADMIN — AMLODIPINE BESYLATE 10 MILLIGRAM(S): 10 TABLET ORAL at 05:32

## 2025-06-22 RX ADMIN — ENOXAPARIN SODIUM 60 MILLIGRAM(S): 100 INJECTION SUBCUTANEOUS at 17:40

## 2025-06-22 RX ADMIN — DEXTROMETHORPHAN HBR, GUAIFENESIN 200 MILLIGRAM(S): 200 LIQUID ORAL at 16:07

## 2025-06-22 RX ADMIN — ENOXAPARIN SODIUM 60 MILLIGRAM(S): 100 INJECTION SUBCUTANEOUS at 05:32

## 2025-06-22 NOTE — PROGRESS NOTE ADULT - ATTENDING COMMENTS
Presented with neck pain, found to have R thyroid mass and RIJ thrombus  S/P biopsy, pending path  On heparin gtt, will transition to therapeutic lovenox; thankful he is not "tethered to the bed"  BP control  Patient states that the waiting is making him anxious, but he agrees with plan to follow up pathology while inpatient   ENT/Onc f/u pending path

## 2025-06-22 NOTE — PROGRESS NOTE ADULT - SUBJECTIVE AND OBJECTIVE BOX
************************  Monalisa Shields MD  Internal Medicine PGY-1  ************************    Patient is a 74y old  Male who presents with a chief complaint of R neck mass (21 Jun 2025 09:51)    Overnight no events, this morning patient with no acute complaints.Denies CP, SOB, fevers/chills, N/V, or abdominal pain.  Patient urinating well with BM(s).      Patient reminded of ongoing careplan.    MEDICATIONS  (STANDING):  amLODIPine   Tablet 10 milliGRAM(s) Oral daily  enoxaparin Injectable 60 milliGRAM(s) SubCutaneous every 12 hours  losartan 50 milliGRAM(s) Oral daily  polyethylene glycol 3350 17 Gram(s) Oral daily  senna 2 Tablet(s) Oral at bedtime    MEDICATIONS  (PRN):  acetaminophen     Tablet .. 650 milliGRAM(s) Oral every 6 hours PRN Temp greater or equal to 38C (100.4F), Mild Pain (1 - 3)  guaiFENesin Oral Liquid (Sugar-Free) 200 milliGRAM(s) Oral every 6 hours PRN Cough  melatonin 3 milliGRAM(s) Oral at bedtime PRN Insomnia      CAPILLARY BLOOD GLUCOSE        I&O's Summary      PHYSICAL EXAM:  Vital Signs Last 24 Hrs  T(C): 36.7 (22 Jun 2025 05:12), Max: 36.8 (21 Jun 2025 12:56)  T(F): 98.1 (22 Jun 2025 05:12), Max: 98.2 (21 Jun 2025 12:56)  HR: 70 (22 Jun 2025 05:12) (60 - 75)  BP: 159/75 (22 Jun 2025 05:12) (116/60 - 159/84)  BP(mean): --  RR: 17 (22 Jun 2025 05:30) (16 - 17)  SpO2: 94% (22 Jun 2025 05:30) (94% - 96%)    Parameters below as of 22 Jun 2025 05:30  Patient On (Oxygen Delivery Method): room air        PHYSICAL EXAM:  General: NAD, non-toxic appearing   HEENT: PERRLA, EOMi, no scleral icterus. right-midline mass  CV: RRR, normal S1 and S2, no m/r/g  Lungs: normal respiratory effort. CTAB, no wheezes, rales, or rhonchi  Abd: soft, nontender, nondistended  Ext: no edema, 2+ peripheral pulses   Pysch: AAOx3, appropriate affect   Neuro: grossly non-focal, moving all extremities spontaneously   Skin: no rashes or lesions     LABS:                        12.0   7.72  )-----------( 237      ( 22 Jun 2025 06:08 )             36.7     06-22    138  |  103  |  34[H]  ----------------------------<  103[H]  4.3   |  25  |  1.13    Ca    9.3      22 Jun 2025 06:08  Phos  3.8     06-22  Mg     2.30     06-22      PTT - ( 22 Jun 2025 06:08 )  PTT:30.5 sec      Urinalysis Basic - ( 22 Jun 2025 06:08 )    Color: x / Appearance: x / SG: x / pH: x  Gluc: 103 mg/dL / Ketone: x  / Bili: x / Urobili: x   Blood: x / Protein: x / Nitrite: x   Leuk Esterase: x / RBC: x / WBC x   Sq Epi: x / Non Sq Epi: x / Bacteria: x

## 2025-06-23 LAB
ANION GAP SERPL CALC-SCNC: 18 MMOL/L — HIGH (ref 7–14)
BUN SERPL-MCNC: 36 MG/DL — HIGH (ref 7–23)
CALCIUM SERPL-MCNC: 9.1 MG/DL — SIGNIFICANT CHANGE UP (ref 8.4–10.5)
CHLORIDE SERPL-SCNC: 103 MMOL/L — SIGNIFICANT CHANGE UP (ref 98–107)
CO2 SERPL-SCNC: 20 MMOL/L — LOW (ref 22–31)
CREAT SERPL-MCNC: 1.12 MG/DL — SIGNIFICANT CHANGE UP (ref 0.5–1.3)
EGFR: 69 ML/MIN/1.73M2 — SIGNIFICANT CHANGE UP
EGFR: 69 ML/MIN/1.73M2 — SIGNIFICANT CHANGE UP
GLUCOSE SERPL-MCNC: 90 MG/DL — SIGNIFICANT CHANGE UP (ref 70–99)
HCT VFR BLD CALC: 36 % — LOW (ref 39–50)
HGB BLD-MCNC: 12 G/DL — LOW (ref 13–17)
MAGNESIUM SERPL-MCNC: 2.3 MG/DL — SIGNIFICANT CHANGE UP (ref 1.6–2.6)
MCHC RBC-ENTMCNC: 29.9 PG — SIGNIFICANT CHANGE UP (ref 27–34)
MCHC RBC-ENTMCNC: 33.3 G/DL — SIGNIFICANT CHANGE UP (ref 32–36)
MCV RBC AUTO: 89.8 FL — SIGNIFICANT CHANGE UP (ref 80–100)
NRBC # BLD AUTO: 0 K/UL — SIGNIFICANT CHANGE UP (ref 0–0)
NRBC # FLD: 0 K/UL — SIGNIFICANT CHANGE UP (ref 0–0)
NRBC BLD AUTO-RTO: 0 /100 WBCS — SIGNIFICANT CHANGE UP (ref 0–0)
PHOSPHATE SERPL-MCNC: 3.5 MG/DL — SIGNIFICANT CHANGE UP (ref 2.5–4.5)
PLATELET # BLD AUTO: 230 K/UL — SIGNIFICANT CHANGE UP (ref 150–400)
PMV BLD: 11.2 FL — SIGNIFICANT CHANGE UP (ref 7–13)
POTASSIUM SERPL-MCNC: 4.3 MMOL/L — SIGNIFICANT CHANGE UP (ref 3.5–5.3)
POTASSIUM SERPL-SCNC: 4.3 MMOL/L — SIGNIFICANT CHANGE UP (ref 3.5–5.3)
RBC # BLD: 4.01 M/UL — LOW (ref 4.2–5.8)
RBC # FLD: 13.8 % — SIGNIFICANT CHANGE UP (ref 10.3–14.5)
SODIUM SERPL-SCNC: 141 MMOL/L — SIGNIFICANT CHANGE UP (ref 135–145)
WBC # BLD: 6.55 K/UL — SIGNIFICANT CHANGE UP (ref 3.8–10.5)
WBC # FLD AUTO: 6.55 K/UL — SIGNIFICANT CHANGE UP (ref 3.8–10.5)

## 2025-06-23 PROCEDURE — 99232 SBSQ HOSP IP/OBS MODERATE 35: CPT | Mod: GC

## 2025-06-23 RX ADMIN — ENOXAPARIN SODIUM 60 MILLIGRAM(S): 100 INJECTION SUBCUTANEOUS at 05:44

## 2025-06-23 RX ADMIN — AMLODIPINE BESYLATE 10 MILLIGRAM(S): 10 TABLET ORAL at 05:44

## 2025-06-23 RX ADMIN — LOSARTAN POTASSIUM 50 MILLIGRAM(S): 100 TABLET, FILM COATED ORAL at 05:44

## 2025-06-23 RX ADMIN — ENOXAPARIN SODIUM 60 MILLIGRAM(S): 100 INJECTION SUBCUTANEOUS at 17:29

## 2025-06-23 NOTE — PROGRESS NOTE ADULT - PROBLEM SELECTOR PLAN 2
Patient with R IJ nonocclusive thrombus in setting of neck mass    - Suspect due to local mass effect and compression of distal R IJ and possible hypercoagulable state   - C/w Lovenox full acc

## 2025-06-23 NOTE — PROGRESS NOTE ADULT - SUBJECTIVE AND OBJECTIVE BOX
PROGRESS NOTE:   Authored by Gigi Pearson MD  Patient is a 74y old  Male who presents with a chief complaint of R neck mass (22 Jun 2025 08:30)      SUBJECTIVE / OVERNIGHT EVENTS:  No acute events overnight. Patient doing well and not in any acute distress.     ADDITIONAL REVIEW OF SYSTEMS: All other systems negative unless otherwise specified.     MEDICATIONS  (STANDING):  amLODIPine   Tablet 10 milliGRAM(s) Oral daily  enoxaparin Injectable 60 milliGRAM(s) SubCutaneous every 12 hours  losartan 50 milliGRAM(s) Oral daily  polyethylene glycol 3350 17 Gram(s) Oral daily  senna 2 Tablet(s) Oral at bedtime    MEDICATIONS  (PRN):  acetaminophen     Tablet .. 650 milliGRAM(s) Oral every 6 hours PRN Temp greater or equal to 38C (100.4F), Mild Pain (1 - 3)  guaiFENesin Oral Liquid (Sugar-Free) 200 milliGRAM(s) Oral every 6 hours PRN Cough  melatonin 3 milliGRAM(s) Oral at bedtime PRN Insomnia      CAPILLARY BLOOD GLUCOSE        I&O's Summary      PHYSICAL EXAM:  Vital Signs Last 24 Hrs  T(C): 36.3 (23 Jun 2025 05:18), Max: 36.6 (22 Jun 2025 21:24)  T(F): 97.3 (23 Jun 2025 05:18), Max: 97.8 (22 Jun 2025 21:24)  HR: 60 (23 Jun 2025 05:18) (60 - 66)  BP: 147/75 (23 Jun 2025 05:18) (131/67 - 168/78)  BP(mean): --  RR: 17 (23 Jun 2025 05:18) (17 - 17)  SpO2: 95% (23 Jun 2025 05:18) (94% - 96%)    Parameters below as of 23 Jun 2025 05:18  Patient On (Oxygen Delivery Method): room air        PHYSICAL EXAM:  General: NAD, non-toxic appearing   HEENT: PERRLA, EOMi, no scleral icterus. right-midline mass  CV: RRR, normal S1 and S2, no m/r/g  Lungs: normal respiratory effort. CTAB, no wheezes, rales, or rhonchi  Abd: soft, nontender, nondistended  Ext: no edema, 2+ peripheral pulses   Pysch: AAOx3, appropriate affect   Neuro: grossly non-focal, moving all extremities spontaneously   Skin: no rashes or lesions   LABS:                        12.0   7.72  )-----------( 237      ( 22 Jun 2025 06:08 )             36.7     06-22    138  |  103  |  34[H]  ----------------------------<  103[H]  4.3   |  25  |  1.13    Ca    9.3      22 Jun 2025 06:08  Phos  3.8     06-22  Mg     2.30     06-22      PTT - ( 22 Jun 2025 06:08 )  PTT:30.5 sec      Urinalysis Basic - ( 22 Jun 2025 06:08 )    Color: x / Appearance: x / SG: x / pH: x  Gluc: 103 mg/dL / Ketone: x  / Bili: x / Urobili: x   Blood: x / Protein: x / Nitrite: x   Leuk Esterase: x / RBC: x / WBC x   Sq Epi: x / Non Sq Epi: x / Bacteria: x          RADIOLOGY & ADDITIONAL TESTS:  Lab Results Reviewed   Imaging Reviewed  Electrocardiogram Reviewed   PROGRESS NOTE:   Authored by Gigi Pearson MD  Patient is a 74y old  Male who presents with a chief complaint of R neck mass (22 Jun 2025 08:30)      SUBJECTIVE / OVERNIGHT EVENTS:  No acute events overnight. Patient doing well and not in any acute distress. Anxious regarding pathology results that are pending.     ADDITIONAL REVIEW OF SYSTEMS: All other systems negative unless otherwise specified.     MEDICATIONS  (STANDING):  amLODIPine   Tablet 10 milliGRAM(s) Oral daily  enoxaparin Injectable 60 milliGRAM(s) SubCutaneous every 12 hours  losartan 50 milliGRAM(s) Oral daily  polyethylene glycol 3350 17 Gram(s) Oral daily  senna 2 Tablet(s) Oral at bedtime    MEDICATIONS  (PRN):  acetaminophen     Tablet .. 650 milliGRAM(s) Oral every 6 hours PRN Temp greater or equal to 38C (100.4F), Mild Pain (1 - 3)  guaiFENesin Oral Liquid (Sugar-Free) 200 milliGRAM(s) Oral every 6 hours PRN Cough  melatonin 3 milliGRAM(s) Oral at bedtime PRN Insomnia      CAPILLARY BLOOD GLUCOSE        I&O's Summary      PHYSICAL EXAM:  Vital Signs Last 24 Hrs  T(C): 36.3 (23 Jun 2025 05:18), Max: 36.6 (22 Jun 2025 21:24)  T(F): 97.3 (23 Jun 2025 05:18), Max: 97.8 (22 Jun 2025 21:24)  HR: 60 (23 Jun 2025 05:18) (60 - 66)  BP: 147/75 (23 Jun 2025 05:18) (131/67 - 168/78)  BP(mean): --  RR: 17 (23 Jun 2025 05:18) (17 - 17)  SpO2: 95% (23 Jun 2025 05:18) (94% - 96%)    Parameters below as of 23 Jun 2025 05:18  Patient On (Oxygen Delivery Method): room air        PHYSICAL EXAM:  General: NAD, non-toxic appearing   HEENT: PERRLA, EOMi, no scleral icterus. right-midline mass  CV: RRR, normal S1 and S2, no m/r/g  Lungs: normal respiratory effort. CTAB, no wheezes, rales, or rhonchi  Abd: soft, nontender, nondistended  Ext: no edema, 2+ peripheral pulses   Pysch: AAOx3, appropriate affect   Neuro: grossly non-focal, moving all extremities spontaneously   Skin: no rashes or lesions   LABS:                        12.0   7.72  )-----------( 237      ( 22 Jun 2025 06:08 )             36.7     06-22    138  |  103  |  34[H]  ----------------------------<  103[H]  4.3   |  25  |  1.13    Ca    9.3      22 Jun 2025 06:08  Phos  3.8     06-22  Mg     2.30     06-22      PTT - ( 22 Jun 2025 06:08 )  PTT:30.5 sec      Urinalysis Basic - ( 22 Jun 2025 06:08 )    Color: x / Appearance: x / SG: x / pH: x  Gluc: 103 mg/dL / Ketone: x  / Bili: x / Urobili: x   Blood: x / Protein: x / Nitrite: x   Leuk Esterase: x / RBC: x / WBC x   Sq Epi: x / Non Sq Epi: x / Bacteria: x          RADIOLOGY & ADDITIONAL TESTS:  Lab Results Reviewed   Imaging Reviewed  Electrocardiogram Reviewed

## 2025-06-23 NOTE — PROGRESS NOTE ADULT - ASSESSMENT
Mr. Venegas is a 73 yo M with HTN presenting after 6 weeks of ear ache and difficulty swallowing with CT showing nonocclusive thrombus R IJ at the level of thyroid gland, 6 x 6 x 7.6 cm heterogenous partially calcified soft tissue mass in the region of the R thyroid lobe, also involving the isthmus, causing mass effect on the trachea which is displaced to the left side without significant airway compromise. 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 Admit to medicine for management of RIJ thrombus and biopsy of R neck mass. S/P Core biopsy of thyroid with path pending. 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

## 2025-06-23 NOTE — PROGRESS NOTE ADULT - ATTENDING COMMENTS
Presented with neck pain, found to have R thyroid mass and RIJ thrombus    S/P biopsy, pending path  Continue therapeutic lovenox. Patient agrees to continue as OP   States that the waiting is making him anxious, but he agrees with plan to follow up pathology while inpatient  ENT/Onc f/u pending path    Rest as above

## 2025-06-24 PROCEDURE — 99232 SBSQ HOSP IP/OBS MODERATE 35: CPT | Mod: GC

## 2025-06-24 RX ADMIN — ENOXAPARIN SODIUM 60 MILLIGRAM(S): 100 INJECTION SUBCUTANEOUS at 17:29

## 2025-06-24 RX ADMIN — AMLODIPINE BESYLATE 10 MILLIGRAM(S): 10 TABLET ORAL at 05:54

## 2025-06-24 RX ADMIN — ENOXAPARIN SODIUM 60 MILLIGRAM(S): 100 INJECTION SUBCUTANEOUS at 05:53

## 2025-06-24 RX ADMIN — Medication 2 TABLET(S): at 21:24

## 2025-06-24 RX ADMIN — LOSARTAN POTASSIUM 50 MILLIGRAM(S): 100 TABLET, FILM COATED ORAL at 05:54

## 2025-06-24 NOTE — PROGRESS NOTE ADULT - SUBJECTIVE AND OBJECTIVE BOX
Patient is a 74y old  Male who presents with a chief complaint of R neck mass (2025 07:16)      ======Overnight/Subjective======  Overnight- NAEON    Subjective- pt seen and examined at bedside.     Brief daily plan- awaiting path.     ======Medications======  MEDICATIONS  (STANDING):  amLODIPine   Tablet 10 milliGRAM(s) Oral daily  enoxaparin Injectable 60 milliGRAM(s) SubCutaneous every 12 hours  losartan 50 milliGRAM(s) Oral daily  pantoprazole    Tablet 40 milliGRAM(s) Oral before breakfast  polyethylene glycol 3350 17 Gram(s) Oral daily  senna 2 Tablet(s) Oral at bedtime    MEDICATIONS  (PRN):  acetaminophen     Tablet .. 650 milliGRAM(s) Oral every 6 hours PRN Temp greater or equal to 38C (100.4F), Mild Pain (1 - 3)  guaiFENesin Oral Liquid (Sugar-Free) 200 milliGRAM(s) Oral every 6 hours PRN Cough  melatonin 3 milliGRAM(s) Oral at bedtime PRN Insomnia      ======Vital Signs======  T(C): 36.6 (25 @ 05:31), Max: 36.6 (25 @ 12:10)  T(F): 97.9 (25 @ 05:31), Max: 97.9 (25 @ 21:51)  HR: 58 (25 @ 05:31) (58 - 67)  BP: 147/80 (25 @ 05:31) (129/67 - 147/80)  BP(mean): --  RR: 18 (25 @ 05:31) (18 - 18)  SpO2: 97% (25 @ 05:31) (96% - 98%)    ======Physical exams======    General: NAD, non-toxic appearing   HEENT: PERRLA, EOMi, no scleral icterus. right-midline mass  CV: RRR, normal S1 and S2, no m/r/g  Lungs: normal respiratory effort. CTAB, no wheezes, rales, or rhonchi  Abd: soft, nontender, nondistended  Ext: no edema, 2+ peripheral pulses   Pysch: AAOx3, appropriate affect   Neuro: grossly non-focal, moving all extremities spontaneously   Skin: no rashes or lesions     ======Labs======                12.0[L]  6.55 >----------< 230  (MCV: 89.8)                36.0[L]   141 | 103 | 36[H]  -----------------------< 90  4.3 | 20[L] | 1.12    TPro: 6.3 / Alb: 3.6 / TBili: 0.3 / DBili: -- / AlkPhos: 63 / ALT: 10 / AST: 15 (25 @ 06:30)  Ca: 9.1 / Phos: 3.5 / M.30 (25 @ 07:32)          ======Microbiology======  Urinalysis Basic - ( 2025 07:32 )    Color: x / Appearance: x / SG: x / pH: x  Gluc: 90 mg/dL / Ketone: x  / Bili: x / Urobili: x   Blood: x / Protein: x / Nitrite: x   Leuk Esterase: x / RBC: x / WBC x   Sq Epi: x / Non Sq Epi: x / Bacteria: x          ======I&O's======  I&O's Summary

## 2025-06-25 PROCEDURE — 99232 SBSQ HOSP IP/OBS MODERATE 35: CPT | Mod: GC

## 2025-06-25 RX ADMIN — AMLODIPINE BESYLATE 10 MILLIGRAM(S): 10 TABLET ORAL at 05:34

## 2025-06-25 RX ADMIN — ENOXAPARIN SODIUM 60 MILLIGRAM(S): 100 INJECTION SUBCUTANEOUS at 17:11

## 2025-06-25 RX ADMIN — LOSARTAN POTASSIUM 50 MILLIGRAM(S): 100 TABLET, FILM COATED ORAL at 05:35

## 2025-06-25 RX ADMIN — ENOXAPARIN SODIUM 60 MILLIGRAM(S): 100 INJECTION SUBCUTANEOUS at 05:35

## 2025-06-25 NOTE — PROGRESS NOTE ADULT - ATTENDING COMMENTS
Presented with neck pain, found to have R thyroid mass and RIJ thrombus    S/P biopsy, pending path  Continue therapeutic lovenox. Patient agrees to continue as OP   States that the waiting is making him anxious, but he agrees with plan to follow up pathology while inpatient  ENT/Onc f/u pending path  Ensures added to help nutritional deficiencies     Rest as above

## 2025-06-25 NOTE — PROGRESS NOTE ADULT - SUBJECTIVE AND OBJECTIVE BOX
Patient is a 74y old  Male who presents with a chief complaint of R neck mass (2025 08:19)      ======Overnight/Subjective======  Overnight    Subjective    Brief daily plan    ======Medications======  MEDICATIONS  (STANDING):  amLODIPine   Tablet 10 milliGRAM(s) Oral daily  enoxaparin Injectable 60 milliGRAM(s) SubCutaneous every 12 hours  losartan 50 milliGRAM(s) Oral daily  pantoprazole    Tablet 40 milliGRAM(s) Oral before breakfast  polyethylene glycol 3350 17 Gram(s) Oral daily  senna 2 Tablet(s) Oral at bedtime    MEDICATIONS  (PRN):  acetaminophen     Tablet .. 650 milliGRAM(s) Oral every 6 hours PRN Temp greater or equal to 38C (100.4F), Mild Pain (1 - 3)  guaiFENesin Oral Liquid (Sugar-Free) 200 milliGRAM(s) Oral every 6 hours PRN Cough  melatonin 3 milliGRAM(s) Oral at bedtime PRN Insomnia      ======Vital Signs======  T(C): 36.3 (25 @ 05:11), Max: 36.6 (25 @ 12:45)  T(F): 97.4 (25 @ 05:11), Max: 97.9 (-25 @ 12:45)  HR: 54 (25 @ 05:11) (54 - 60)  BP: 158/83 (25 @ 05:11) (137/79 - 158/83)  BP(mean): --  RR: 16 (25 @ 05:11) (16 - 17)  SpO2: 97% (25 @ 05:11) (96% - 97%)    ======Physical exams======  GENERAL: NAD  Cardiovascular: RRR, S1 S2, no m/r/g, no JVD  LUNGS: Unlabored respiration, CTABL  ABDOMEN: Soft, NTND  EXTREMITIES: Warm extremities, no edema, peripheral pulses 2+ bilaterally  NEURO: AAOx3, PERRLA    ======Labs======                12.0[L]  6.55 >----------< 230  (MCV: 89.8)                36.0[L]   141 | 103 | 36[H]  -----------------------< 90  4.3 | 20[L] | 1.12    TPro: 6.3 / Alb: 3.6 / TBili: 0.3 / DBili: -- / AlkPhos: 63 / ALT: 10 / AST: 15 (25 @ 06:30)  Ca: 9.1 / Phos: 3.5 / M.30 (25 @ 07:32)          ======Microbiology======  Urinalysis Basic - ( 2025 07:32 )    Color: x / Appearance: x / SG: x / pH: x  Gluc: 90 mg/dL / Ketone: x  / Bili: x / Urobili: x   Blood: x / Protein: x / Nitrite: x   Leuk Esterase: x / RBC: x / WBC x   Sq Epi: x / Non Sq Epi: x / Bacteria: x          ======I&O's======  I&O's Summary

## 2025-06-26 PROCEDURE — 99232 SBSQ HOSP IP/OBS MODERATE 35: CPT | Mod: GC

## 2025-06-26 PROCEDURE — G0452: CPT | Mod: 26

## 2025-06-26 RX ADMIN — LOSARTAN POTASSIUM 50 MILLIGRAM(S): 100 TABLET, FILM COATED ORAL at 06:25

## 2025-06-26 RX ADMIN — AMLODIPINE BESYLATE 10 MILLIGRAM(S): 10 TABLET ORAL at 06:25

## 2025-06-26 RX ADMIN — ENOXAPARIN SODIUM 60 MILLIGRAM(S): 100 INJECTION SUBCUTANEOUS at 17:06

## 2025-06-26 RX ADMIN — ENOXAPARIN SODIUM 60 MILLIGRAM(S): 100 INJECTION SUBCUTANEOUS at 06:25

## 2025-06-26 NOTE — PROVIDER CONTACT NOTE (OTHER) - RECOMMENDATIONS
notify Md Red Rojas
notify MD
notify MD, give BP med
notify MD
give due bp med
notify MD
notify MD
notify MD, give BP med
notify MD, re check BP

## 2025-06-26 NOTE — PROVIDER CONTACT NOTE (OTHER) - BACKGROUND
75 y/o male admitted for acute embolism and thrombosis of internal jugular vein with hx of HTN
75 y/o male admitted for acute embolism and thrombosis of internal jugular vein with hx of HTN
pt is admitted with internal jugular vein thrombosis
pt 74 year old male admitted with acute embolism and thrombus of internal jugular vein
pt is admitted with internal jugular vein thrombosis
73 y/o male admitted for acute embolism and thrombosis of internal jugular vein with hx of HTN
75 y/o male admitted for acute embolism and thrombosis of internal jugular vein with hx of HTN
75 y/o male admitted for acute embolism and thrombosis of internal jugular vein with hx of HTN
pt is admitted with internal jugular vein thrombosis
pt is admitted with internal jugular vein thrombosis
73 y/o male admitted for acute embolism and thrombosis of internal jugular vein with hx of HTN

## 2025-06-26 NOTE — PROVIDER CONTACT NOTE (OTHER) - NAME OF MD/NP/PA/DO NOTIFIED:
MD LAUREANO
Tiburcio Nixon MD
Kye Mckeon MD
Dr.Bhatt Fishman
Kye Mckeon MD
MD CONTEH, LILIA
MD CONTEH, LILIA
Gautam Chow MD
MD CONTEH, LILIA
MD CONTEH, LILIA
Md Red Rojas

## 2025-06-26 NOTE — PROGRESS NOTE ADULT - ATTENDING COMMENTS
Presented with neck pain, found to have R thyroid mass and RIJ thrombus    S/P biopsy, pending path  Continue therapeutic lovenox. Patient agrees to continue as OP   States that the waiting is making him anxious, but he agrees with plan to follow up pathology while inpatient  ENT/Onc f/u pending path  Ensures added to help nutritional deficiencies     Patient concerned about warmth coming from lower neck. No sig warmth, pain, or erythema noted.      Rest as above

## 2025-06-26 NOTE — PROVIDER CONTACT NOTE (OTHER) - SITUATION
/85 HR 58
HR 55
HR 53 /86
HR 54 /83
HR 56
pt neck swollen and slightly bruised
/88, HR 55
/95 HR 52
pt's HR is 58, less than parameter. bp 147/80, HR 58
/96 HR 57
HR 59 /81
normal sinus rhythm

## 2025-06-26 NOTE — PROVIDER CONTACT NOTE (OTHER) - ACTION/TREATMENT ORDERED:
MD made aware - a/w for feedback
MD made aware, no new orders
MD made aware - a/w for feedback
MD notified and aware. no intervention ordered at this time.
MD notified and made aware, no new intervention at this time
Md Red stafford MD states attending came by and assessed, stable for now, will reassess tomorrow
MD MADE AWARE, no new orders; give due bp med
MD made aware and with order to give IV hydralazine 5mg
MD notified and aware. no intervention ordered at this time.
MD notified and aware. no intervention ordered at this time.
no intervention at this time. will continue monitoring.

## 2025-06-26 NOTE — PROVIDER CONTACT NOTE (OTHER) - DATE AND TIME:
13-Jun-2025 22:00
14-Jun-2025 00:46
24-Jun-2025 06:12
15-[place]-2025 05:38
24-Jun-2025 22:23
26-Jun-2025 13:38
13-Jun-2025 23:05
26-Jun-2025 06:23
13-Jun-2025 21:36
15-[place]-2025 21:30
25-Jun-2025 05:42

## 2025-06-26 NOTE — PROGRESS NOTE ADULT - PROBLEM SELECTOR PLAN 1
History   Chief Complaint:  Chest Pain       HPI   Austin Garza is a 79 year old male with a history of AAA, hypertension, type II diabetes, and chronic lymphocytic leukemia, taking Plavix and ibrutinib, who presents with chest pain. The patient reports intermittent chest pain starting when he woke Monday morning. She states that, since onset, the pain feels that it has been worse each morning when he wakes and this evening around 2300 the pain was quite significant, stabbing, and associated with what the patient felt was an irregular heart beat. Currently, he states that he is pain free when he is not moving or breathing deeply, but any pain or deep breathing causes stabbing pain in the center of his chest and shoulder blades. He otherwise denies bloody stool, hematuria, fever, cough, congestion, nausea, vomiting, or diarrhea. He denies history of abnormal heart rhythm or atrial fibrillation.     Review of Systems   Constitutional: Negative for fever.   HENT: Negative for congestion.    Respiratory: Negative for cough.    Cardiovascular: Positive for chest pain and palpitations.   Gastrointestinal: Negative for blood in stool, diarrhea, nausea and vomiting.   Genitourinary: Negative for hematuria.   All other systems reviewed and are negative.        Allergies:  Ace Inhibitors    Medications:  Aspirin 81 mg  Plavix  Ibrutinib  Losartan-hydrochlorothiazide  Metformin  Rosuvastatin    Past Medical History:    AAA  BCC, face  Chronic lymphocytic leukemia  Diaphragmatic hernia  Diverticulitis of colon  GERD  Hypertension  Hyperlipidemia  IBS  Macular degeneration of retina   SCC, back  Type II diabetes  Artery dissection  Bilateral incipient cataracts  CKD stage III  Inflammatory monoarthritis     Past Surgical History:    Appendectomy  Bone marrow biopsy  Endovascular repair aneurysm abdominal aorta  Fistulotomy with marsupialization for repair  SCC resection, back     Family History:    Cerebrovascular  disease  Hypertension  CAD  Skin cancer  Glaucoma     Social History:  The patient was brought to the emergency department by EMS.  PCP: Vanessa Munson     Physical Exam     Patient Vitals for the past 24 hrs:   BP Temp Temp src Pulse Resp SpO2   07/30/21 0515 (!) 156/92 -- -- 115 -- 96 %   07/30/21 0445 -- -- -- -- -- 93 %   07/30/21 0430 (!) 148/87 -- -- 107 -- 96 %   07/30/21 0415 (!) 138/98 -- -- 113 -- 99 %   07/30/21 0400 (!) 142/93 -- -- 103 -- 97 %   07/30/21 0345 131/81 -- -- 110 -- 98 %   07/30/21 0330 (!) 138/102 -- -- 114 -- 97 %   07/30/21 0315 (!) 131/105 -- -- 112 -- 96 %   07/30/21 0305 (!) 167/93 98.5  F (36.9  C) Oral (!) 123 18 98 %       Physical Exam  Gen: alert  HEENT: PERRL, oropharynx clear  Neck: normal ROM  CV: No murmurs, 2+ distal pulses in all 4 extremities, tachycardic, irregularly irregular rhythm  Chest: no tenderness over the chest wall  Pulm: breath sounds equal, lungs clear  Abd: Soft, nontender  Back: no evidence of injury  MSK: no lower extremity edema, no calf tenderness  Skin: no rash  Neuro: alert, appropriate conversation and interaction    Emergency Department Course   ECG  ECG taken at 0311, ECG read at 0313  Atrial Fibrillation with rapid ventricular response. Inferior infarct, age undetermined.  Rate 120 bpm. MT interval * ms. QRS duration 74 ms. QT/QTc 256/361 ms. P-R-T axes * -1 9.     Imaging:  CT Chest Pulmonary Embolism w Contrast  IMPRESSION:  1.  Negative for pulmonary embolism.  2.  Ectatic ascending thoracic aorta measuring 4.3 cm in diameter. No evidence for dissection.  3.  Atelectasis in the lower lungs but nothing definite for acute pulmonary infiltrate or pneumonitis.  4.  Cardiac enlargement and trace pericardial effusion.  5.  Marked coronary artery calcification.        As read by Radiology.     Laboratory:  CBC: WBC: 11.6 (H), HGB: 12.8 (L), PLT: 102 (L)    BMP: Glucose 198 (H), GFR: 41 (L), Creatinine: 1.58 (H), o/w WNL     Troponin (Collected 0326):  <0.015    D-dimer: 2.11 (H)    Asymptomatic SARS-CoV2 (COVID19) Virus PCR: Pending    Procedures      Emergency Department Course:  Reviewed:  I reviewed the patient's nursing notes, vitals, past medical records, and Care Everywhere.     Assessments:  0310 I performed an exam of the patient and obtained history, as documented above.     0534 I rechecked the patient and discussed his workup. Given the findings he consents to admission for further evaluation and care as needed.     0540 I rechecked the patient, who's rate is currently around 110 bpm.    Consults:   0558 I consulted with Dr. Madrigal, of the hospitalist service, who accepts the patient for admission at this time.     Interventions:    Medications   CT Flush 100mL Saline (89 mLs Intravenous Given 7/30/21 0424)   iopamidol (ISOVUE-370) solution 500 mL (71 mLs Intravenous Given 7/30/21 0425)   aspirin (ASA) tablet 325 mg (325 mg Oral Given 7/30/21 0603)       Disposition:  The patient was admitted to the hospital under the care of Dr. Montiel.     Impression & Plan   Covid-19  Austin Garza was evaluated during a global COVID-19 pandemic, which necessitated consideration that the patient might be at risk for infection with the SARS-CoV-2 virus that causes COVID-19.   Applicable protocols for evaluation were followed during the patient's care.   COVID-19 was considered as part of the patient's evaluation. The plan for testing is:  a test was obtained during this visit.       Medical Decision Making:  Patient presents for several days of intermittent chest pain.  On arrival in the ED, patient has a-fib with RVR.  Pt received verapamil from EMS with improvement in rate control.  Rate Consistently in 110s here.  Initial trop is negative here.  PE considered given sharp and intermittent nature of CP.  Ddimer is elevated therefore CT chest PE protocol was obtained.  This was negative for PE.  Given need for ongoing cardiac eval and management of a-fib, patient  will be admitted.  Case discussed with Dr. Madrigal and admitted to telemetry.    Diagnosis:    ICD-10-CM    1. Chest pain, unspecified type  R07.9    2. Atrial fibrillation, unspecified type (H)  I48.91        Scribe Disclosure:  Muriel BLAIR, am serving as a scribe at 3:03 AM on 7/30/2021 to document services personally performed by Selina Delacruz MD based on my observations and the provider's statements to me.     July 30, 2021   [facility]     Selina Delacruz MD  07/30/21 0629     Patient presenting after 6 weeks of ear ache and difficulty swallowing with outpatient sonogram showing neck mass and jugular vein clot  - CT showing nonocclusive thrombus R IJ at the level of thyroid gland, 6 x 6 x 7.6 cm heterogenous partially calcified soft tissue mass in the region of the R thyroid lobe, also involving the isthmus, causing mass effect on the trachea which is displaced to the left side without significant airway compromise  - Differential includes aggressive neoplasm versus multinodular goiter  - Thyroid labs notably WNL; TSH 4.07, T4 9.13, T3 total 107  - ENT following, appreciate recs  - IR consult for biopsy of mass  - S + S eval for associated dysphagia

## 2025-06-26 NOTE — PROGRESS NOTE ADULT - SUBJECTIVE AND OBJECTIVE BOX
Patient is a 74y old  Male who presents with a chief complaint of R neck mass (2025 06:56)      ======Overnight/Subjective======  Overnight    Subjective  A&Ox3  PE: stable neck mass    Brief daily plan    ======Medications======  MEDICATIONS  (STANDING):  amLODIPine   Tablet 10 milliGRAM(s) Oral daily  enoxaparin Injectable 60 milliGRAM(s) SubCutaneous every 12 hours  losartan 50 milliGRAM(s) Oral daily  pantoprazole    Tablet 40 milliGRAM(s) Oral before breakfast  polyethylene glycol 3350 17 Gram(s) Oral daily  senna 2 Tablet(s) Oral at bedtime    MEDICATIONS  (PRN):  acetaminophen     Tablet .. 650 milliGRAM(s) Oral every 6 hours PRN Temp greater or equal to 38C (100.4F), Mild Pain (1 - 3)  guaiFENesin Oral Liquid (Sugar-Free) 200 milliGRAM(s) Oral every 6 hours PRN Cough  melatonin 3 milliGRAM(s) Oral at bedtime PRN Insomnia      ======Vital Signs======  T(C): 36.3 (25 @ 13:12), Max: 36.7 (25 @ 21:18)  T(F): 97.4 (25 @ 13:12), Max: 98.1 (25 @ 21:18)  HR: 63 (25 @ 13:12) (53 - 63)  BP: 132/76 (25 @ 13:12) (132/76 - 157/86)  BP(mean): --  RR: 17 (25 @ 13:12) (16 - 17)  SpO2: 97% (25 @ 13:12) (94% - 99%)    ======Physical exams======  GENERAL: NAD  Cardiovascular: RRR, S1 S2, no m/r/g, no JVD  LUNGS: Unlabored respiration, CTABL  ABDOMEN: Soft, NTND  EXTREMITIES: Warm extremities, no edema, peripheral pulses 2+ bilaterally  NEURO: AAOx3, PERRLA    ======Labs======                12.0[L]  6.55 >----------< 230  (MCV: 89.8)                36.0[L]   141 | 103 | 36[H]  -----------------------< 90  4.3 | 20[L] | 1.12    TPro: 6.3 / Alb: 3.6 / TBili: 0.3 / DBili: -- / AlkPhos: 63 / ALT: 10 / AST: 15 (25 @ 06:30)  Ca: 9.1 / Phos: 3.5 / M.30 (25 @ 07:32)          ======Microbiology======        ======I&O's======  I&O's Summary

## 2025-06-26 NOTE — PROVIDER CONTACT NOTE (OTHER) - ASSESSMENT
pt is alert and oriented. denies any discomfort and not in any distress
/85 HR 58 T 97.9 RR 18 oxygen sat RA 98%; not in distress, alert and responsive, asymptomatic
/95 HR 52; not in distress, alert and repsonsive, asymptomatic
/96 HR 57
pt is alert and oriented. denies any discomfort and not in any distress
/88, HR 55; not in distress, alert and responsive, asymptomatic
HR 55 T 97.5 RR 17 /88 oxygen sat ra 96%; not in distress, alert and responsive
pt is a&o 4, neck swollen and slightly bruised
pt is alert and oriented. denies any discomfort and not in any distress
pt is alert and oriented. denies any discomfort and not in any distress
hr 56 bp 165/80 not in distress, alert and responsive

## 2025-06-26 NOTE — PROVIDER CONTACT NOTE (OTHER) - REASON
pt's HR is 58, less than parameter
HR 54
/96 HR 57
HR 56
/85 HR 58
HR 59
/95 HR 52
pt neck swollen and slightly bruised
/88, HR 55
HR 53 /86
HR 55

## 2025-06-27 LAB
ANION GAP SERPL CALC-SCNC: 12 MMOL/L — SIGNIFICANT CHANGE UP (ref 7–14)
BUN SERPL-MCNC: 31 MG/DL — HIGH (ref 7–23)
CALCIUM SERPL-MCNC: 8.7 MG/DL — SIGNIFICANT CHANGE UP (ref 8.4–10.5)
CHLORIDE SERPL-SCNC: 105 MMOL/L — SIGNIFICANT CHANGE UP (ref 98–107)
CO2 SERPL-SCNC: 23 MMOL/L — SIGNIFICANT CHANGE UP (ref 22–31)
CREAT SERPL-MCNC: 1 MG/DL — SIGNIFICANT CHANGE UP (ref 0.5–1.3)
EGFR: 79 ML/MIN/1.73M2 — SIGNIFICANT CHANGE UP
EGFR: 79 ML/MIN/1.73M2 — SIGNIFICANT CHANGE UP
GLUCOSE SERPL-MCNC: 96 MG/DL — SIGNIFICANT CHANGE UP (ref 70–99)
HCT VFR BLD CALC: 39.1 % — SIGNIFICANT CHANGE UP (ref 39–50)
HGB BLD-MCNC: 12.5 G/DL — LOW (ref 13–17)
MAGNESIUM SERPL-MCNC: 2 MG/DL — SIGNIFICANT CHANGE UP (ref 1.6–2.6)
MCHC RBC-ENTMCNC: 29.1 PG — SIGNIFICANT CHANGE UP (ref 27–34)
MCHC RBC-ENTMCNC: 32 G/DL — SIGNIFICANT CHANGE UP (ref 32–36)
MCV RBC AUTO: 90.9 FL — SIGNIFICANT CHANGE UP (ref 80–100)
NON-GYNECOLOGICAL CYTOLOGY STUDY: SIGNIFICANT CHANGE UP
NRBC # BLD AUTO: 0 K/UL — SIGNIFICANT CHANGE UP (ref 0–0)
NRBC # FLD: 0 K/UL — SIGNIFICANT CHANGE UP (ref 0–0)
NRBC BLD AUTO-RTO: 0 /100 WBCS — SIGNIFICANT CHANGE UP (ref 0–0)
PHOSPHATE SERPL-MCNC: 3.5 MG/DL — SIGNIFICANT CHANGE UP (ref 2.5–4.5)
PLATELET # BLD AUTO: 267 K/UL — SIGNIFICANT CHANGE UP (ref 150–400)
PMV BLD: 10.6 FL — SIGNIFICANT CHANGE UP (ref 7–13)
POTASSIUM SERPL-MCNC: 4.3 MMOL/L — SIGNIFICANT CHANGE UP (ref 3.5–5.3)
POTASSIUM SERPL-SCNC: 4.3 MMOL/L — SIGNIFICANT CHANGE UP (ref 3.5–5.3)
RBC # BLD: 4.3 M/UL — SIGNIFICANT CHANGE UP (ref 4.2–5.8)
RBC # FLD: 13.6 % — SIGNIFICANT CHANGE UP (ref 10.3–14.5)
SODIUM SERPL-SCNC: 140 MMOL/L — SIGNIFICANT CHANGE UP (ref 135–145)
WBC # BLD: 6.19 K/UL — SIGNIFICANT CHANGE UP (ref 3.8–10.5)
WBC # FLD AUTO: 6.19 K/UL — SIGNIFICANT CHANGE UP (ref 3.8–10.5)

## 2025-06-27 PROCEDURE — 99233 SBSQ HOSP IP/OBS HIGH 50: CPT | Mod: GC

## 2025-06-27 PROCEDURE — 70491 CT SOFT TISSUE NECK W/DYE: CPT | Mod: 26

## 2025-06-27 PROCEDURE — 99223 1ST HOSP IP/OBS HIGH 75: CPT | Mod: GC

## 2025-06-27 RX ORDER — DABRAFENIB 75 MG/1
75 CAPSULE ORAL TWICE DAILY
Qty: 120 | Refills: 3 | Status: ACTIVE | COMMUNITY
Start: 2025-06-27 | End: 1900-01-01

## 2025-06-27 RX ORDER — TRAMETINIB 2 MG/1
2 TABLET, FILM COATED ORAL DAILY
Qty: 30 | Refills: 3 | Status: ACTIVE | COMMUNITY
Start: 2025-06-27 | End: 1900-01-01

## 2025-06-27 RX ADMIN — ENOXAPARIN SODIUM 60 MILLIGRAM(S): 100 INJECTION SUBCUTANEOUS at 05:45

## 2025-06-27 RX ADMIN — AMLODIPINE BESYLATE 10 MILLIGRAM(S): 10 TABLET ORAL at 05:45

## 2025-06-27 RX ADMIN — LOSARTAN POTASSIUM 50 MILLIGRAM(S): 100 TABLET, FILM COATED ORAL at 05:45

## 2025-06-27 RX ADMIN — ENOXAPARIN SODIUM 60 MILLIGRAM(S): 100 INJECTION SUBCUTANEOUS at 17:22

## 2025-06-27 NOTE — CONSULT NOTE ADULT - PROVIDER SPECIALTY LIST ADULT
Heme/Onc
ENT
Intervent Radiology
Quality 47: Advance Care Plan: Advance Care Planning discussed and documented; advance care plan or surrogate decision maker documented in the medical record.
Quality 431: Preventive Care And Screening: Unhealthy Alcohol Use - Screening: Patient not identified as an unhealthy alcohol user when screened for unhealthy alcohol use using a systematic screening method
Quality 226: Preventive Care And Screening: Tobacco Use: Screening And Cessation Intervention: Patient screened for tobacco use and is an ex/non-smoker
Quality 130: Documentation Of Current Medications In The Medical Record: Current Medications Documented
Detail Level: Detailed

## 2025-06-27 NOTE — PROGRESS NOTE ADULT - PROBLEM SELECTOR PLAN 1
Patient presenting after 6 weeks of ear ache and difficulty swallowing with outpatient sonogram showing neck mass and jugular vein clot  - CT showing nonocclusive thrombus R IJ at the level of thyroid gland, 6 x 6 x 7.6 cm heterogenous partially calcified soft tissue mass in the region of the R thyroid lobe, also involving the isthmus, causing mass effect on the trachea which is displaced to the left side without significant airway compromise  - Differential includes aggressive neoplasm versus multinodular goiter  - Thyroid labs notably WNL; TSH 4.07, T4 9.13, T3 total 107  - ENT following, appreciate recs  - IR consult for biopsy of mass  - S + S eval for associated dysphagia Patient presenting after 6 weeks of ear ache and difficulty swallowing with outpatient sonogram showing neck mass and jugular vein clot  - CT showing nonocclusive thrombus R IJ at the level of thyroid gland, 6 x 6 x 7.6 cm heterogenous partially calcified soft tissue mass in the region of the R thyroid lobe, also involving the isthmus, causing mass effect on the trachea which is displaced to the left side without significant airway compromise  - Thyroid labs notably WNL; TSH 4.07, T4 9.13, T3 total 107  - Biopsy done, path showed thyroid anaplastic carcinoma  - ENT following, appreciate recs  - Heme/onc following, appreciate recs  - S + S eval for associated dysphagia  - Patient will begin receiving chemo tx outpatient on Mon, pt prefers to stay in hospital until Mon

## 2025-06-27 NOTE — PROGRESS NOTE ADULT - ATTENDING COMMENTS
Presented with neck pain, found to have R thyroid mass and RIJ thrombus    S/P biopsy, path now showing c/f anaplastic thyroid carcinoma.  ENT consulted. Pt not currently a surgical candidate.   Oncology consulted, dabrafenib and trametinib were sent to [facility] pharmacy. Unfortunately the pharmacy is closed until Monday   Continue therapeutic lovenox. Patient agrees to continue as OP     Rest as above

## 2025-06-27 NOTE — PROGRESS NOTE ADULT - PROBLEM SELECTOR PLAN 2
Patient with R IJ nonocclusive thrombus in setting of neck mass  - Suspect due to local mass effect and compression of distal R IJ and possible hypercoagulable state   - Started on heparin gtt Patient with R IJ nonocclusive thrombus in setting of neck mass  - Suspect due to local mass effect and compression of distal R IJ and possible hypercoagulable state   - c/w IM Lovenox

## 2025-06-27 NOTE — CONSULT NOTE ADULT - ATTENDING COMMENTS
73 yo M admitted for R neck mass found to have anaplastic thyroid carcinoma. CT Neck: Right thyroid mass measuring up to 8.0 cm. There is leftward tracheal deviation with mild luminal narrowing. There is approximately 2 cm substernal extension. Mass effect upon the right IJ results in severe narrowing. Two areas of tumoral invasion into the right IJ as discussed above. Filling defect within the right IJ measures up to 1.3 cm with mixed density, possibly representing mixed tumoral and bland thrombus. IR Biopsy performed 6/18 showed Anaplastic Thyroid Cancer with BRAF mutation. Discussed the high risk nature of the location and aggressiveness of tumor with patient. Explained that this tumor can cause rapid airway compromise which can cause difficulty in securing the airway especially in emergent situations which can increase the chance of mortality. 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 All risks explained and clarifying questions answered.  Discussed case w/ Dr. Mirza, biodesix sent, Dabrafenib and Trematinib sent to [facility] and will start on Monday. Will set up appointment outpatient with Dr. Mirza at [facility].

## 2025-06-27 NOTE — CONSULT NOTE ADULT - SUBJECTIVE AND OBJECTIVE BOX
Hematology/Oncology Consult Note    ADMISSION HPI  Mr. Venegas is a 75 yo M with HTN presenting for R neck mass. Patient was in his normal state of health until about 6 weeks ago when he noticed a R ear ache. Pain was aching, located over R neck and radiated up to R ear. Didnt think anything of it and figured it would go away after some time. During those weeks he developed some difficulty swallowing and occasional headaches that were new for him. He got used to the difficulty swallowing and was able to eat some food but admits that he has had poor PO intake for several weeks now. Endorses weight loss of about 16 lb (140lb -> 124lb). Went to PCP who noted his trachea was deviated to the left side. Sonogram showed a mass in R neck with associated clot in jugular and he was advised to present to ED for further eval. In ED, patient had mild LITZY but otherwise labs WNL. 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 No destruction of the cartilage. The mass extends into the left side in the retropharyngeal in the prevertebral region. 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 Admit to medicine for management of RIJ thrombus and biopsy of R neck lesion    On interview, the patient mentions the above complaints. Otherwise denies fever, chills, recent travel, changes in taste, changes in vision, changes in hearing, chest pain, palpitations, SOB, cough, abd pain, n/v/d/c, muscle aches, joint pain, blood in urine or stools.  (13 Jun 2025 05:50)    ONCOLOGIC HISTORY  Newly diagnosed Anaplastic Thyroid Carcinoma    ALLERGIES  No Known Allergies    INTOLERANCES  No Known Intolerances    MEDICATIONS (STANDING)  amLODIPine   Tablet 10 milliGRAM(s) Oral daily  enoxaparin Injectable 60 milliGRAM(s) SubCutaneous every 12 hours  losartan 50 milliGRAM(s) Oral daily  pantoprazole    Tablet 40 milliGRAM(s) Oral before breakfast  polyethylene glycol 3350 17 Gram(s) Oral daily  senna 2 Tablet(s) Oral at bedtime    MEDICATIONS (PRN)  acetaminophen     Tablet .. 650 milliGRAM(s) Oral every 6 hours PRN Temp greater or equal to 38C (100.4F), Mild Pain (1 - 3)  guaiFENesin Oral Liquid (Sugar-Free) 200 milliGRAM(s) Oral every 6 hours PRN Cough  melatonin 3 milliGRAM(s) Oral at bedtime PRN Insomnia    PAST MEDICAL & SURGICAL HISTORY  HTN (hypertension)  History of open reduction and internal fixation (ORIF) procedure    FAMILY HISTORY  FHx: brain cancer (Father)    SOCIAL HISTORY    REVIEW OF SYSTEMS:  CONSTITUTIONAL: No weakness, fevers or chills  EYES/ENT: No visual changes;  No vertigo or throat pain   NECK: No pain or stiffness  RESPIRATORY: No cough, wheezing, hemoptysis; No shortness of breath  CARDIOVASCULAR: No chest pain or palpitations  GASTROINTESTINAL: No abdominal or epigastric pain. No nausea, vomiting, or hematemesis; No diarrhea or constipation. No melena or hematochezia.  GENITOURINARY: No dysuria, frequency or hematuria  NEUROLOGICAL: No numbness or weakness  SKIN: No itching, burning, rashes, or lesions   All other review of systems is negative unless indicated above.      VITALS  T(F): 98 (06-27-25 @ 12:29), Max: 98 (06-27-25 @ 12:29)  HR: 66 (06-27-25 @ 12:29)  BP: 137/79 (06-27-25 @ 12:29)  RR: 17 (06-27-25 @ 12:29)  SpO2: 97% (06-27-25 @ 12:29)  Wt(kg): --    GENERAL: NAD, well-developed  HEAD:  Atraumatic, Normocephalic  EYES: EOMI, PERRLA, conjunctiva and sclera clear  NECK: Supple, No JVD  CHEST/LUNG: Clear to auscultation bilaterally; No wheeze  HEART: Regular rate and rhythm; No murmurs, rubs, or gallops  ABDOMEN: Soft, Nontender, Nondistended; Bowel sounds present  EXTREMITIES:  2+ Peripheral Pulses, No clubbing, cyanosis, or edema  NEUROLOGY: non-focal  SKIN: No rashes or lesions                       12.5   6.19  )-----------( 267      ( 27 Jun 2025 04:39 )             39.1     06-27    140  |  105  |  31[H]  ----------------------------<  96  4.3   |  23  |  1.00    Ca    8.7      27 Jun 2025 04:39  Phos  3.5     06-27  Mg     2.00     06-27  Magnesium: 2.00 mg/dL (06-27 @ 04:39)  Phosphorus: 3.5 mg/dL (06-27 @ 04:39) Hematology/Oncology Consult Note    ADMISSION HPI  Mr. Venegas is a 73 yo M with HTN presenting for R neck mass. Patient was in his normal state of health until about 6 weeks ago when he noticed a R ear ache. Pain was aching, located over R neck and radiated up to R ear. Didnt think anything of it and figured it would go away after some time. During those weeks he developed some difficulty swallowing and occasional headaches that were new for him. He got used to the difficulty swallowing and was able to eat some food but admits that he has had poor PO intake for several weeks now. Endorses weight loss of about 16 lb (140lb -> 124lb). Went to PCP who noted his trachea was deviated to the left side. Sonogram showed a mass in R neck with associated clot in jugular and he was advised to present to ED for further eval. In ED, patient had mild LITZY but otherwise labs WNL. 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 No destruction of the cartilage. The mass extends into the left side in the retropharyngeal in the prevertebral region. 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 Admit to medicine for management of RIJ thrombus and biopsy of R neck lesion    On interview, the patient mentions the above complaints. Otherwise denies fever, chills, recent travel, changes in taste, changes in vision, changes in hearing, chest pain, palpitations, SOB, cough, abd pain, n/v/d/c, muscle aches, joint pain, blood in urine or stools.  (13 Jun 2025 05:50)    ONCOLOGIC HISTORY  Newly diagnosed Anaplastic Thyroid Carcinoma    ALLERGIES  No Known Allergies    INTOLERANCES  No Known Intolerances    MEDICATIONS (STANDING)  amLODIPine   Tablet 10 milliGRAM(s) Oral daily  enoxaparin Injectable 60 milliGRAM(s) SubCutaneous every 12 hours  losartan 50 milliGRAM(s) Oral daily  pantoprazole    Tablet 40 milliGRAM(s) Oral before breakfast  polyethylene glycol 3350 17 Gram(s) Oral daily  senna 2 Tablet(s) Oral at bedtime    MEDICATIONS (PRN)  acetaminophen     Tablet .. 650 milliGRAM(s) Oral every 6 hours PRN Temp greater or equal to 38C (100.4F), Mild Pain (1 - 3)  guaiFENesin Oral Liquid (Sugar-Free) 200 milliGRAM(s) Oral every 6 hours PRN Cough  melatonin 3 milliGRAM(s) Oral at bedtime PRN Insomnia    PAST MEDICAL & SURGICAL HISTORY  HTN (hypertension)  History of open reduction and internal fixation (ORIF) procedure    FAMILY HISTORY  FHx: brain cancer (Father)    VITALS  T(F): 98 (06-27-25 @ 12:29), Max: 98 (06-27-25 @ 12:29)  HR: 66 (06-27-25 @ 12:29)  BP: 137/79 (06-27-25 @ 12:29)  RR: 17 (06-27-25 @ 12:29)  SpO2: 97% (06-27-25 @ 12:29)  Wt(kg): --    GENERAL: NAD  HEAD:  NCAT   EYES: EOMI, Ccnjunctiva and sclera clear  NECK: Large thyroid mass visible  CHEST/LUNG: Normal respiratory effort  HEART: Regular rate and rhythm  ABDOMEN: Nondistended  NEUROLOGY: Non-focal                         12.5   6.19  )-----------( 267      ( 27 Jun 2025 04:39 )             39.1     06-27    140  |  105  |  31[H]  ----------------------------<  96  4.3   |  23  |  1.00    Ca    8.7      27 Jun 2025 04:39  Phos  3.5     06-27  Mg     2.00     06-27  Magnesium: 2.00 mg/dL (06-27 @ 04:39)  Phosphorus: 3.5 mg/dL (06-27 @ 04:39)

## 2025-06-27 NOTE — CONSULT NOTE ADULT - ASSESSMENT
Mr. Venegas is a 75 yo M with HTN presenting for R neck mass found to have anaplastic thyroid carcinoma    Imaging Reports  - US Thyroid: Right Lobe: 7.3 x 3.6 x 2.8 cm. Diffusely heterogeneous. 11 mm mid nodule with rim calcification. Focal echo poor area to an upper pole with increased vascularity measuring approximately 3.2 x 1.9 x 2.3 cm Left Lobe: 4.1 x 1.5 x 1.1 cm. Several cysts and colloid nodules. Cervical Lymph Nodes: No enlarged or abnormal morphology cervical nodes. Focal right IJV partially occlusive thrombus  - CT Neck: Right thyroid mass measuring up to 8.0 cm is concerning for neoplasm. There is leftward tracheal deviation with mild luminal narrowing. There is approximately 2 cm substernal extension. Mass effect upon the right IJ results in severe narrowing. Two areas of tumoral invasion into the right IJ as discussed above. Filling defect within the right IJ measures up to 1.3 cm with mixed density, possibly representing mixed tumoral and bland thrombus.  - CT Chest, Abdomen, Pelvis w/ IV Contrast: 1. Left interpole 1.4 cm indeterminate renal lesion, increased in size compared to prior study. Consider further evaluation with MRI as clinically indicated.  2. Additional pancreatic hypodense/cystic lesions. Consider further evaluation with MRCP as clinically indicated. 3. Moderate stool burden, most notably at the rectum with rectal distention and wall thickening, which may represent stercoral proctitis. 4. Prominent right groin lymph node measuring up to 2.8 cm. 5. Partially visualized large heterogeneous multicystic right neck lesion. 6. Incidental partially visualized left vastus medialis intramuscular lipomatous mass measuring 10.6 x 7.5 cm, increased in size compared to prior study. Contrast-enhanced MRI is suggested for further evaluation.  - MRCP: Pancreas could not be evaluated due to significant motion artifacts. The lesion in question in the interpolar region of left kidney demonstrates minimal internal enhancement and may represent a minimally complex cyst. A repeat contrast-enhanced MRI/MRCP is advised on outpatient basis to reevaluate pancreas.  - MR Left Femur: Large benign perineural simple lipoma encasing left obturator neurovascular bundle at its passage through the obturator foramen.    #Newly Diagnosed Anaplastic Thyroid Carcinoma  IR Biopsy performed 6/18    Recommendations  - Obtain ENT evaluation for resection of thyroid neoplasm; will need tracheostomy    ***Note and recommendations are not complete until discussion with attending physician*** Mr. Venegas is a 75 yo M with HTN presenting for R neck mass found to have anaplastic thyroid carcinoma    Imaging Reports  - US Thyroid: Right Lobe: 7.3 x 3.6 x 2.8 cm. Diffusely heterogeneous. 11 mm mid nodule with rim calcification. Focal echo poor area to an upper pole with increased vascularity measuring approximately 3.2 x 1.9 x 2.3 cm Left Lobe: 4.1 x 1.5 x 1.1 cm. Several cysts and colloid nodules. Cervical Lymph Nodes: No enlarged or abnormal morphology cervical nodes. Focal right IJV partially occlusive thrombus  - CT Neck: Right thyroid mass measuring up to 8.0 cm is concerning for neoplasm. There is leftward tracheal deviation with mild luminal narrowing. There is approximately 2 cm substernal extension. Mass effect upon the right IJ results in severe narrowing. Two areas of tumoral invasion into the right IJ as discussed above. Filling defect within the right IJ measures up to 1.3 cm with mixed density, possibly representing mixed tumoral and bland thrombus.  - CT Chest, Abdomen, Pelvis w/ IV Contrast: 1. Left interpole 1.4 cm indeterminate renal lesion, increased in size compared to prior study. Consider further evaluation with MRI as clinically indicated.  2. Additional pancreatic hypodense/cystic lesions. Consider further evaluation with MRCP as clinically indicated. 3. Moderate stool burden, most notably at the rectum with rectal distention and wall thickening, which may represent stercoral proctitis. 4. Prominent right groin lymph node measuring up to 2.8 cm. 5. Partially visualized large heterogeneous multicystic right neck lesion. 6. Incidental partially visualized left vastus medialis intramuscular lipomatous mass measuring 10.6 x 7.5 cm, increased in size compared to prior study. Contrast-enhanced MRI is suggested for further evaluation.  - MRCP: Pancreas could not be evaluated due to significant motion artifacts. The lesion in question in the interpolar region of left kidney demonstrates minimal internal enhancement and may represent a minimally complex cyst. A repeat contrast-enhanced MRI/MRCP is advised on outpatient basis to reevaluate pancreas.  - MR Left Femur: Large benign perineural simple lipoma encasing left obturator neurovascular bundle at its passage through the obturator foramen.    #Newly Diagnosed Anaplastic Thyroid Carcinoma  - IR Biopsy performed 6/18 found to have Anaplastic Thyroid Cancer with BRAF mutation  - Discussed the high risk nature of the location and aggressiveness of tumor with patient. Explained that this tumor can cause rapid airway compromise which can cause difficulty in securing the airway especially in emergent situations which can increase the chance of mortality. 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 All risks explained and clarifying questions answered.     Recommendations  - Discussed case w/ Dr. Mirza, biodesix sent, Dabrafenib and Trematinib sent to [facility] will start on Monday  - Will set up appointment outpatient with Dr. Mirza through [facility]  - ENT evaluation appreciated. Oncology team discussed with patient regarding risk of airway compromise (described above), patient does not want to pursue a tracheostomy, understands risks  - No plans for inpatient treatment, oncology to sign off, please reach out with any questions

## 2025-06-27 NOTE — CHART NOTE - NSCHARTNOTEFT_GEN_A_CORE
NUTRITION FOLLOW UP NOTE    Pt seen for severe malnutrition f/u    SOURCE: [x] Patient [x] Medical record [] RN/PCA [] Family/Caregiver []Patient unavailable [x] Other: MD     Medical Course: 74 year old male with a PMH of HTN presenting after 6 weeks of ear ache and difficulty swallowing with CT showing nonocclusive thrombus R IJ at the level of thyroid gland. S/P Core biopsy of thyroid with path pending. Concern for anaplastic thyroid cancer per chart.    Diet Prescription: Soft and bite sized, moderately thick liquids, kosher w/ ensure max 1x daily    Nutrition Course: Patient seen for VFSS/MBS (6/16) w/ rec for soft and bite sized/moderately thick liquids. Patient seen at bed side, lethargic and feel asleep during encounter. Noted w/ intakes % per RN flow sheet. No GI distress noted. Patient did report tolerating new diet consistency well and liking the food better. No edema or pressure injuries noted per RN flow sheet.    Pertinent Medications: MEDICATIONS  (STANDING):  amLODIPine   Tablet 10 milliGRAM(s) Oral daily  enoxaparin Injectable 60 milliGRAM(s) SubCutaneous every 12 hours  losartan 50 milliGRAM(s) Oral daily  polyethylene glycol 3350 17 Gram(s) Oral daily  senna 2 Tablet(s) Oral at bedtime    MEDICATIONS  (PRN):  acetaminophen     Tablet .. 650 milliGRAM(s) Oral every 6 hours PRN Temp greater or equal to 38C (100.4F), Mild Pain (1 - 3)  guaiFENesin Oral Liquid (Sugar-Free) 200 milliGRAM(s) Oral every 6 hours PRN Cough  melatonin 3 milliGRAM(s) Oral at bedtime PRN Insomnia    Pertinent Labs: 06-23 Na141 mmol/L Glu 90 mg/dL K+ 4.3 mmol/L Cr  1.12 mg/dL BUN 36 mg/dL[H] 06-23 Phos 3.5 mg/dL    Weight: 64.2 kg (6/23) per bed scale observation - ?accuracy  59 kg (6/13)    Estimated Needs: [x] recalculated: based on weight 59 kg  Calories - 1,770-2,065 kcal (30-35 kcal/kg)  Protein - 71-89 g pro (1.2-1.5 g/kg)    Previous Nutrition Diagnosis: Severe malnutrition  Nutrition Diagnosis is [x ] ongoing  [ ] resolved [ ] not applicable   New Nutrition Diagnosis: [x ] not applicable     Education:  [ ] Provided pt with verbal / written education on   [ ] Provided on previous assessment by RD  [x ] Not applicable  [ ] Pt refused     Interventions:   - continue diet as ordered  - remove ensure max from diet given thickened liquids. Nutrition department will provide mighty shake TID (660 kcal, 27 g pro)  - obtain weekly weight    Monitor & Evaluate:  PO intake, tolerance to diet/supplement, nutrition related lab values, weight trends, BMs/GI distress, hydration status, skin integrity.    Orlando Taveras RD, CDCES  TEAMS
PRE-INTERVENTIONAL RADIOLOGY PROCEDURE NOTE  ***************************************************************  Aleida Miguel, PGY1  Internal Medicine   [employer] TEAMS  ***************************************************************  Patient Name: JEAN SHAFFER    Patient Age: 74y    Patient Gender: Male    Procedure: R thyroid biopsy     Diagnosis/Indication: possible new Ca    Interventional Radiology Attending Physician: Dr. Delgado     Ordering Attending Physician: Dr. Trammell    Pertinent Medical History: hypertension, R IJ thrombosis     Pertinent labs:                      14.0   8.25  )-----------( 245      ( 13 Jun 2025 07:20 )             42.2       06-13    142  |  106  |  28[H]  ----------------------------<  96  3.8   |  22  |  1.13    Ca    9.4      13 Jun 2025 07:20  Phos  3.0     06-13  Mg     2.10     06-13    TPro  6.5  /  Alb  3.8  /  TBili  0.3  /  DBili  x   /  AST  15  /  ALT  11  /  AlkPhos  69  06-13      PT/INR - ( 13 Jun 2025 07:45 )   PT: 13.5 sec;   INR: 1.17 ratio         PTT - ( 13 Jun 2025 07:45 )  PTT:109.6 sec        Patient and Family Aware ? Yes
IR core biopsy c/f anaplastic carcinoma. Case discussed with Dr. Urban, no surgical resection at this time. Med onc emailed for inpatient chemo.
PRE-INTERVENTIONAL RADIOLOGY PROCEDURE NOTE      Patient Age: 74    Patient Gender: Male    Procedure: Core thyroid Biopsy     Diagnosis/Indication: Concern for anaplastic thyroid cancer     Interventional Radiology Attending Physician: Dr Delgado     Ordering Attending Physician: Dr Alvarez     Pertinent Medical History: PMH of HTN presenting with RIJ Thrombus and Thyroid mass     Pertinent labs:                      12.8   9.04  )-----------( 213      ( 17 Jun 2025 06:40 )             38.8       06-17    139  |  104  |  21  ----------------------------<  97  3.9   |  23  |  0.93    Ca    8.8      17 Jun 2025 06:40  Phos  3.1     06-17  Mg     1.90     06-17        PTT - ( 17 Jun 2025 15:14 )  PTT:51.9 sec        Patient and Family Aware ? Yes
Patient is scheduled for Core thyroid Biopsy on 6/18    --Please hold AM anticoagulation  --Please put in pre-procedure note.  --Please place procedure order for thyroid core biopsy under Dr. Delgado
Patient scheduled for core thyroid biopsy with IR today.   - Please send the specimen for BRAF testing in addition to pathology.  - Primary team made aware and advised to let IR team know as well

## 2025-06-27 NOTE — PROGRESS NOTE ADULT - TIME BILLING
Time-based billing (NON-critical care).     More than 50% of the visit was spent counseling and / or coordinating care by the attending physician.  The necessity of the time spent during the encounter on this date of service was due to:     review of laboratory data, radiology results, consultants' recommendations, documentation in [place], discussion with patient/ACP and interdisciplinary staff (such as , social workers, etc). Interventions were performed as documented above.
Time-based billing (NON-critical care).     More than 50% of the visit was spent counseling and / or coordinating care by the attending physician.  The necessity of the time spent during the encounter on this date of service was due to:     review of laboratory data, radiology results, consultants' recommendations, documentation in [place], discussion with patient/ACP and interdisciplinary staff (such as , social workers, etc). Interventions were performed as documented above.
Time-based billing (NON-critical care).     More than 50% of the visit was spent counseling and / or coordinating care by the attending physician.  The necessity of the time spent during the encounter on this date of service was due to:     review of laboratory data, radiology results, consultants' recommendations, documentation in [place], discussion with patient/ACP and interdisciplinary staff (such as , social workers, etc). Interventions were performed as documented above.
(including, but not limited to)  - preparing to see the patient (eg, review of tests)   - obtaining and/or reviewing separately obtained history  - performing a medically appropriate examination and/or evaluation   - counseling and educating the patient/family/caregiver    - ordering medications, tests, or procedures   - referring and communicating with other health care professionals (when not separately reported)   - documenting clinical information in the electronic or other health record   - independently interpreting results (not separately reported) and communicating results to the patient/family/caregiver   - care coordination (not separately reported)
(including, but not limited to)  - preparing to see the patient (eg, review of tests)   - obtaining and/or reviewing separately obtained history  - performing a medically appropriate examination and/or evaluation   - counseling and educating the patient/family/caregiver    - ordering medications, tests, or procedures   - referring and communicating with other health care professionals (when not separately reported)   - documenting clinical information in the electronic or other health record   - independently interpreting results (not separately reported) and communicating results to the patient/family/caregiver   - care coordination (not separately reported)
- Ordering, reviewing, and interpreting labs, testing, and imaging.  - Independently obtaining a review of systems and performing a physical exam  - Reviewing prior hospitalization and where necessary, outpatient records.  - Reviewing consultant recommendations/communicating with consultants  - Counselling and educating patient and family regarding interpretation of aforementioned items and plan of care.
Time-based billing (NON-critical care).     More than 50% of the visit was spent counseling and / or coordinating care by the attending physician.  The necessity of the time spent during the encounter on this date of service was due to:     review of laboratory data, radiology results, consultants' recommendations, documentation in [place], discussion with patient/ACP and interdisciplinary staff (such as , social workers, etc). Interventions were performed as documented above.
- Ordering, reviewing, and interpreting labs, testing, and imaging.  - Independently obtaining a review of systems and performing a physical exam  - Reviewing prior hospitalization and where necessary, outpatient records.  - Reviewing consultant recommendations/communicating with consultants  - Counselling and educating patient and family regarding interpretation of aforementioned items and plan of care.
Time-based billing (NON-critical care).     More than 50% of the visit was spent counseling and / or coordinating care by the attending physician.  The necessity of the time spent during the encounter on this date of service was due to:     review of laboratory data, radiology results, consultants' recommendations, documentation in [place], discussion with patient/ACP and interdisciplinary staff (such as , social workers, etc). Interventions were performed as documented above.

## 2025-06-27 NOTE — CHART NOTE - NSCHARTNOTESELECT_GEN_ALL_CORE
ENT/Event Note
ENT/Event Note
IR Pre-Procedure/Event Note
IR/Event Note
IR pre-procedure
Nutrition Services

## 2025-06-27 NOTE — PROGRESS NOTE ADULT - SUBJECTIVE AND OBJECTIVE BOX
Patient is a 74y old  Male who presents with a chief complaint of R neck mass (2025 15:17)      ======Overnight/Subjective======  Overnight    Subjective    Brief daily plan    ======Medications======  MEDICATIONS  (STANDING):  amLODIPine   Tablet 10 milliGRAM(s) Oral daily  enoxaparin Injectable 60 milliGRAM(s) SubCutaneous every 12 hours  losartan 50 milliGRAM(s) Oral daily  pantoprazole    Tablet 40 milliGRAM(s) Oral before breakfast  polyethylene glycol 3350 17 Gram(s) Oral daily  senna 2 Tablet(s) Oral at bedtime    MEDICATIONS  (PRN):  acetaminophen     Tablet .. 650 milliGRAM(s) Oral every 6 hours PRN Temp greater or equal to 38C (100.4F), Mild Pain (1 - 3)  guaiFENesin Oral Liquid (Sugar-Free) 200 milliGRAM(s) Oral every 6 hours PRN Cough  melatonin 3 milliGRAM(s) Oral at bedtime PRN Insomnia      ======Vital Signs======  T(C): 36.4 (25 @ 05:10), Max: 36.5 (25 @ 21:15)  T(F): 97.5 (25 @ 05:10), Max: 97.7 (25 @ 21:15)  HR: 59 (25 @ 05:10) (59 - 63)  BP: 161/84 (25 @ 05:10) (132/76 - 161/84)  BP(mean): --  RR: 16 (25 @ 05:10) (16 - 17)  SpO2: 97% (25 @ 05:10) (97% - 98%)    ======Physical exams======  GENERAL: NAD  Cardiovascular: RRR, S1 S2, no m/r/g, no JVD  LUNGS: Unlabored respiration, CTABL  ABDOMEN: Soft, NTND  EXTREMITIES: Warm extremities, no edema, peripheral pulses 2+ bilaterally  NEURO: AAOx3, PERRLA    ======Labs======                12.5[L]  6.19 >----------< 267  (MCV: 90.9)                39.1   140 | 105 | 31[H]  -----------------------< 96  4.3 | 23 | 1.00    TPro: 6.3 / Alb: 3.6 / TBili: 0.3 / DBili: -- / AlkPhos: 63 / ALT: 10 / AST: 15 (25 @ 06:30)  Ca: 8.7 / Phos: 3.5 / M.00 (25 @ 04:39)          ======Microbiology======  Urinalysis Basic - ( 2025 04:39 )    Color: x / Appearance: x / SG: x / pH: x  Gluc: 96 mg/dL / Ketone: x  / Bili: x / Urobili: x   Blood: x / Protein: x / Nitrite: x   Leuk Esterase: x / RBC: x / WBC x   Sq Epi: x / Non Sq Epi: x / Bacteria: x          ======I&O's======  I&O's Summary       Patient is a 74y old  Male who presents with a chief complaint of R neck mass (2025 15:17)      ======Overnight/Subjective======  Overnight  No overnight events    Subjective  Patient reports that his neck mass has grown over the past 2 days. No difficulty breathing or eating, however does feel a "tickling" sensation.  PE: 3x2 inch mass present on neck area    ======Medications======  MEDICATIONS  (STANDING):  amLODIPine   Tablet 10 milliGRAM(s) Oral daily  enoxaparin Injectable 60 milliGRAM(s) SubCutaneous every 12 hours  losartan 50 milliGRAM(s) Oral daily  pantoprazole    Tablet 40 milliGRAM(s) Oral before breakfast  polyethylene glycol 3350 17 Gram(s) Oral daily  senna 2 Tablet(s) Oral at bedtime    MEDICATIONS  (PRN):  acetaminophen     Tablet .. 650 milliGRAM(s) Oral every 6 hours PRN Temp greater or equal to 38C (100.4F), Mild Pain (1 - 3)  guaiFENesin Oral Liquid (Sugar-Free) 200 milliGRAM(s) Oral every 6 hours PRN Cough  melatonin 3 milliGRAM(s) Oral at bedtime PRN Insomnia      ======Vital Signs======  T(C): 36.4 (25 @ 05:10), Max: 36.5 (25 @ 21:15)  T(F): 97.5 (25 @ 05:10), Max: 97.7 (25 @ 21:15)  HR: 59 (25 @ 05:10) (59 - 63)  BP: 161/84 (25 @ 05:10) (132/76 - 161/84)  BP(mean): --  RR: 16 (25 @ 05:10) (16 - 17)  SpO2: 97% (25 @ 05:10) (97% - 98%)    ======Physical exams======  GENERAL: NAD  Cardiovascular: RRR, S1 S2, no m/r/g, no JVD  LUNGS: Unlabored respiration, CTABL  ABDOMEN: Soft, NTND  EXTREMITIES: Warm extremities, no edema, peripheral pulses 2+ bilaterally  NEURO: AAOx3, PERRLA    ======Labs======                12.5[L]  6.19 >----------< 267  (MCV: 90.9)                39.1   140 | 105 | 31[H]  -----------------------< 96  4.3 | 23 | 1.00    TPro: 6.3 / Alb: 3.6 / TBili: 0.3 / DBili: -- / AlkPhos: 63 / ALT: 10 / AST: 15 (25 @ 06:30)  Ca: 8.7 / Phos: 3.5 / M.00 (25 @ 04:39)          ======Microbiology======  Urinalysis Basic - ( 2025 04:39 )    Color: x / Appearance: x / SG: x / pH: x  Gluc: 96 mg/dL / Ketone: x  / Bili: x / Urobili: x   Blood: x / Protein: x / Nitrite: x   Leuk Esterase: x / RBC: x / WBC x   Sq Epi: x / Non Sq Epi: x / Bacteria: x          ======I&O's======  I&O's Summary

## 2025-06-28 PROCEDURE — 99232 SBSQ HOSP IP/OBS MODERATE 35: CPT | Mod: GC

## 2025-06-28 RX ADMIN — LOSARTAN POTASSIUM 50 MILLIGRAM(S): 100 TABLET, FILM COATED ORAL at 05:27

## 2025-06-28 RX ADMIN — ENOXAPARIN SODIUM 60 MILLIGRAM(S): 100 INJECTION SUBCUTANEOUS at 05:39

## 2025-06-28 RX ADMIN — AMLODIPINE BESYLATE 10 MILLIGRAM(S): 10 TABLET ORAL at 05:27

## 2025-06-28 RX ADMIN — ENOXAPARIN SODIUM 60 MILLIGRAM(S): 100 INJECTION SUBCUTANEOUS at 18:35

## 2025-06-28 NOTE — PROGRESS NOTE ADULT - PROBLEM SELECTOR PLAN 4
- Cont home amlodipine and losartan DVT: Heparin full AC  Diet: NPO Pending speech and swallow eval, plan for biopsy  Dispo: Pending biopsy plan

## 2025-06-28 NOTE — PROGRESS NOTE ADULT - PROBLEM SELECTOR PLAN 1
Patient presenting after 6 weeks of ear ache and difficulty swallowing with outpatient sonogram showing neck mass and jugular vein clot  - CT showing nonocclusive thrombus R IJ at the level of thyroid gland, 6 x 6 x 7.6 cm heterogenous partially calcified soft tissue mass in the region of the R thyroid lobe, also involving the isthmus, causing mass effect on the trachea which is displaced to the left side without significant airway compromise  - Thyroid labs notably WNL; TSH 4.07, T4 9.13, T3 total 107  - Biopsy done, path showed thyroid anaplastic carcinoma  - ENT following, appreciate recs  - Heme/onc following, appreciate recs  - S + S eval for associated dysphagia  - Patient will begin receiving chemo tx outpatient on Mon, pt prefers to stay in hospital until Mon

## 2025-06-28 NOTE — PROGRESS NOTE ADULT - PROBLEM SELECTOR PLAN 5
DVT: Heparin full AC  Diet: Soft and bite sized with mod thick lqds   Dispo: medically active, no needs.
DVT: Heparin full AC  Diet: NPO Pending speech and swallow eval, plan for biopsy  Dispo: Pending biopsy plan
DVT: Heparin full AC  Diet: NPO Pending speech and swallow eval, plan for biopsy  Dispo: Pending biopsy plan
DVT: Heparin full AC  Diet: Soft and bite sized with mod thick lqds   Dispo: medically active, no needs.
DVT: Heparin full AC  Diet: NPO Pending speech and swallow eval, plan for biopsy  Dispo: Pending biopsy plan
DVT: Heparin full AC  Diet: Pureed pending cinesophagram   Dispo: medically active, no needs.
DVT: Heparin full AC  Diet: Soft and bite sized with mod thick lqds   Dispo: medically active, no needs.
DVT: Heparin full AC  Diet: Soft and bite sized with mod thick lqds   Dispo: medically active, no needs.
DVT: Heparin full AC  Diet: NPO Pending speech and swallow eval, plan for biopsy  Dispo: Pending biopsy plan
DVT: Heparin full AC  Diet: NPO Pending speech and swallow eval, plan for biopsy  Dispo: Pending biopsy plan
DVT: Heparin full AC  Diet: Soft and bite sized with mod thick lqds   Dispo: medically active, no needs.
DVT: Heparin full AC  Diet: NPO Pending speech and swallow eval, plan for biopsy  Dispo: Pending biopsy plan
DVT: Heparin full AC (held for possible IR core bx)  Diet: Soft and bite sized with mod thick lqds   Dispo: medically active, no needs.
DVT: Heparin full AC  Diet: Soft and bite sized with mod thick lqds   Dispo: medically active, no needs.
DVT: Heparin full AC  Diet: Soft and bite sized with mod thick lqds   Dispo: medically active, no needs.
DVT: Heparin full AC  Diet: NPO Pending speech and swallow eval, plan for biopsy  Dispo: Pending biopsy plan

## 2025-06-28 NOTE — PROGRESS NOTE ADULT - SUBJECTIVE AND OBJECTIVE BOX
Patient is a 74y old  Male who presents with a chief complaint of R neck mass (2025 13:44)      ======Overnight/Subjective======  Overnight    Subjective    Brief daily plan    ======Medications======  MEDICATIONS  (STANDING):  amLODIPine   Tablet 10 milliGRAM(s) Oral daily  enoxaparin Injectable 60 milliGRAM(s) SubCutaneous every 12 hours  losartan 50 milliGRAM(s) Oral daily  pantoprazole    Tablet 40 milliGRAM(s) Oral before breakfast  polyethylene glycol 3350 17 Gram(s) Oral daily  senna 2 Tablet(s) Oral at bedtime    MEDICATIONS  (PRN):  acetaminophen     Tablet .. 650 milliGRAM(s) Oral every 6 hours PRN Temp greater or equal to 38C (100.4F), Mild Pain (1 - 3)  guaiFENesin Oral Liquid (Sugar-Free) 200 milliGRAM(s) Oral every 6 hours PRN Cough  melatonin 3 milliGRAM(s) Oral at bedtime PRN Insomnia      ======Vital Signs======  T(C): 36.5 (25 @ 05:12), Max: 36.7 (25 @ 12:29)  T(F): 97.7 (25 @ 05:12), Max: 98 (25 @ 12:29)  HR: 57 (25 @ 05:12) (57 - 66)  BP: 160/88 (25 @ 05:12) (137/79 - 160/88)  BP(mean): --  RR: 18 (25 @ 05:12) (17 - 18)  SpO2: 99% (25 @ 05:12) (97% - 99%)    ======Physical exams======  GENERAL: NAD  Cardiovascular: RRR, S1 S2, no m/r/g, no JVD  LUNGS: Unlabored respiration, CTABL  ABDOMEN: Soft, NTND  EXTREMITIES: Warm extremities, no edema, peripheral pulses 2+ bilaterally  NEURO: AAOx3, PERRLA    ======Labs======                12.5[L]  6.19 >----------< 267  (MCV: 90.9)                39.1   140 | 105 | 31[H]  -----------------------< 96  4.3 | 23 | 1.00    TPro: 6.3 / Alb: 3.6 / TBili: 0.3 / DBili: -- / AlkPhos: 63 / ALT: 10 / AST: 15 (25 @ 06:30)  Ca: 8.7 / Phos: 3.5 / M.00 (25 @ 04:39)          ======Microbiology======  Urinalysis Basic - ( 2025 04:39 )    Color: x / Appearance: x / SG: x / pH: x  Gluc: 96 mg/dL / Ketone: x  / Bili: x / Urobili: x   Blood: x / Protein: x / Nitrite: x   Leuk Esterase: x / RBC: x / WBC x   Sq Epi: x / Non Sq Epi: x / Bacteria: x          ======I&O's======  I&O's Summary       Patient is a 74y old  Male who presents with a chief complaint of R neck mass (2025 13:44)    ======Overnight/Subjective======  Overnight  None    Subjective  Pt reports that he feels that the mass has grown larger. C/o new mild pain in the R throat area.    ======Medications======  MEDICATIONS  (STANDING):  amLODIPine   Tablet 10 milliGRAM(s) Oral daily  enoxaparin Injectable 60 milliGRAM(s) SubCutaneous every 12 hours  losartan 50 milliGRAM(s) Oral daily  pantoprazole    Tablet 40 milliGRAM(s) Oral before breakfast  polyethylene glycol 3350 17 Gram(s) Oral daily  senna 2 Tablet(s) Oral at bedtime    MEDICATIONS  (PRN):  acetaminophen     Tablet .. 650 milliGRAM(s) Oral every 6 hours PRN Temp greater or equal to 38C (100.4F), Mild Pain (1 - 3)  guaiFENesin Oral Liquid (Sugar-Free) 200 milliGRAM(s) Oral every 6 hours PRN Cough  melatonin 3 milliGRAM(s) Oral at bedtime PRN Insomnia    ======Vital Signs======  T(C): 36.5 (25 @ 05:12), Max: 36.7 (25 @ 12:29)  T(F): 97.7 (25 @ 05:12), Max: 98 (25 @ 12:29)  HR: 57 (25 @ 05:12) (57 - 66)  BP: 160/88 (25 @ 05:12) (137/79 - 160/88)  BP(mean): --  RR: 18 (25 @ 05:12) (17 - 18)  SpO2: 99% (25 @ 05:12) (97% - 99%)    ======Physical exams======  GENERAL: NAD  Cardiovascular: RRR, S1 S2, no m/r/g, no JVD  LUNGS: Unlabored respiration, CTABL  ABDOMEN: Soft, NTND  EXTREMITIES: Warm extremities, no edema, peripheral pulses 2+ bilaterally  NEURO: AAOx3, PERRLA    ======Labs======                12.5[L]  6.19 >----------< 267  (MCV: 90.9)                39.1   140 | 105 | 31[H]  -----------------------< 96  4.3 | 23 | 1.00    TPro: 6.3 / Alb: 3.6 / TBili: 0.3 / DBili: -- / AlkPhos: 63 / ALT: 10 / AST: 15 (25 @ 06:30)  Ca: 8.7 / Phos: 3.5 / M.00 (25 @ 04:39)      ======Microbiology======  Urinalysis Basic - ( 2025 04:39 )    Color: x / Appearance: x / SG: x / pH: x  Gluc: 96 mg/dL / Ketone: x  / Bili: x / Urobili: x   Blood: x / Protein: x / Nitrite: x   Leuk Esterase: x / RBC: x / WBC x   Sq Epi: x / Non Sq Epi: x / Bacteria: x          ======I&O's======  I&O's Summary

## 2025-06-28 NOTE — PROGRESS NOTE ADULT - PROBLEM SELECTOR PLAN 3
Patient presents after several weeks of poor PO intake due to dysphagia found to have elevated Scr to 1.32  - Baseline in outpatient labs around 1.00  - Suspect due to poor PO intake and hypovolemia   - Cont  cc/hr   - Check urine studies  - Trend BMP daily - Cont home amlodipine and losartan

## 2025-06-28 NOTE — PROGRESS NOTE ADULT - PROBLEM SELECTOR PLAN 2
Patient with R IJ nonocclusive thrombus in setting of neck mass  - Suspect due to local mass effect and compression of distal R IJ and possible hypercoagulable state   - c/w IM Lovenox

## 2025-06-28 NOTE — PROGRESS NOTE ADULT - ATTENDING COMMENTS
75 yo M with HTN presented with neck pain, found to have R thyroid mass and RIJ thrombus. path c/f anaplastic thyroid carcinoma.    #Anaplastic thyroid carcinoma: ENT consulted. Pt not currently a surgical candidate. Oncology consulted, dabrafenib and trametinib were sent to [facility] pharmacy. Unfortunately the pharmacy is closed until Monday   #RIJ thrombus: Continue therapeutic lovenox. Patient agrees to continue as OP     Rest as above

## 2025-06-29 PROCEDURE — 99232 SBSQ HOSP IP/OBS MODERATE 35: CPT | Mod: GC

## 2025-06-29 RX ADMIN — ENOXAPARIN SODIUM 60 MILLIGRAM(S): 100 INJECTION SUBCUTANEOUS at 05:51

## 2025-06-29 RX ADMIN — ENOXAPARIN SODIUM 60 MILLIGRAM(S): 100 INJECTION SUBCUTANEOUS at 17:41

## 2025-06-29 RX ADMIN — LOSARTAN POTASSIUM 50 MILLIGRAM(S): 100 TABLET, FILM COATED ORAL at 05:48

## 2025-06-29 RX ADMIN — AMLODIPINE BESYLATE 10 MILLIGRAM(S): 10 TABLET ORAL at 05:48

## 2025-06-29 NOTE — PROGRESS NOTE ADULT - ATTENDING COMMENTS
73 yo M with HTN presented with neck pain, found to have R thyroid mass and RIJ thrombus. path c/f anaplastic thyroid carcinoma.    #Anaplastic thyroid carcinoma: ENT consulted. Pt not currently a surgical candidate. Oncology consulted, dabrafenib and trametinib were sent to [facility] pharmacy. Unfortunately the pharmacy is closed until Monday. pt requesting to stay until Monday.   #RIJ thrombus: Continue therapeutic lovenox. Patient agrees to continue as OP     Rest as above

## 2025-06-29 NOTE — PROGRESS NOTE ADULT - ASSESSMENT
Mr. Venegas is a 73 yo M with HTN presenting after 6 weeks of ear ache and difficulty swallowing with CT showing nonocclusive thrombus R IJ at the level of thyroid gland, 6 x 6 x 7.6 cm heterogenous partially calcified soft tissue mass in the region of the R thyroid lobe, also involving the isthmus, causing mass effect on the trachea which is displaced to the left side without significant airway compromise. 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 Admit to medicine for management of RIJ thrombus and biopsy of R neck mass  Mr. Venegas is a 75 yo M with HTN presenting after 6 weeks of ear ache and difficulty swallowing with CT showing nonocclusive thrombus R IJ at the level of thyroid gland, 6 x 6 x 7.6 cm heterogenous partially calcified soft tissue mass in the region of the R thyroid lobe, also involving the isthmus, causing mass effect on the trachea which is displaced to the left side without significant airway compromise. Biopsy of mass positive for anaplastic thyroid carcinoma.

## 2025-06-29 NOTE — PROGRESS NOTE ADULT - SUBJECTIVE AND OBJECTIVE BOX
Patient is a 74y old  Male who presents with a chief complaint of R neck mass (2025 08:05)      ======Overnight/Subjective======  Overnight  No events    Subjective    Brief daily plan    ======Medications======  MEDICATIONS  (STANDING):  amLODIPine   Tablet 10 milliGRAM(s) Oral daily  enoxaparin Injectable 60 milliGRAM(s) SubCutaneous every 12 hours  losartan 50 milliGRAM(s) Oral daily  pantoprazole    Tablet 40 milliGRAM(s) Oral before breakfast  polyethylene glycol 3350 17 Gram(s) Oral daily  senna 2 Tablet(s) Oral at bedtime    MEDICATIONS  (PRN):  acetaminophen     Tablet .. 650 milliGRAM(s) Oral every 6 hours PRN Temp greater or equal to 38C (100.4F), Mild Pain (1 - 3)  guaiFENesin Oral Liquid (Sugar-Free) 200 milliGRAM(s) Oral every 6 hours PRN Cough  melatonin 3 milliGRAM(s) Oral at bedtime PRN Insomnia      ======Vital Signs======  T(C): 36.6 (25 @ 05:21), Max: 36.6 (25 @ 05:21)  T(F): 97.9 (25 @ 05:21), Max: 97.9 (25 @ 05:21)  HR: 60 (25 @ 05:21) (58 - 62)  BP: 147/78 (25 @ 05:21) (127/66 - 147/78)  BP(mean): --  RR: 18 (25 @ 05:21) (18 - 18)  SpO2: 97% (25 @ 05:21) (97% - 100%)    ======Physical exams======  GENERAL: NAD  Cardiovascular: RRR, S1 S2, no m/r/g, no JVD  LUNGS: Unlabored respiration, CTABL  ABDOMEN: Soft, NTND  EXTREMITIES: Warm extremities, no edema, peripheral pulses 2+ bilaterally  NEURO: AAOx3, PERRLA    ======Labs======                12.5[L]  6.19 >----------< 267  (MCV: 90.9)                39.1   140 | 105 | 31[H]  -----------------------< 96  4.3 | 23 | 1.00    TPro: 6.3 / Alb: 3.6 / TBili: 0.3 / DBili: -- / AlkPhos: 63 / ALT: 10 / AST: 15 (25 @ 06:30)  Ca: 8.7 / Phos: 3.5 / M.00 (25 @ 04:39)          ======Microbiology======        ======I&O's======  I&O's Summary       Patient is a 74y old  Male who presents with a chief complaint of R neck mass (2025 08:05)    ======Overnight/Subjective======  Overnight  No acute events    Subjective  Pt reports he can feel the mass pressing on his throat. Denied pain, SOB, difficulty swallowing.    PE: There is a large L neck mass present. Non-tender.    ======Medications======  MEDICATIONS  (STANDING):  amLODIPine   Tablet 10 milliGRAM(s) Oral daily  enoxaparin Injectable 60 milliGRAM(s) SubCutaneous every 12 hours  losartan 50 milliGRAM(s) Oral daily  pantoprazole    Tablet 40 milliGRAM(s) Oral before breakfast  polyethylene glycol 3350 17 Gram(s) Oral daily  senna 2 Tablet(s) Oral at bedtime    MEDICATIONS  (PRN):  acetaminophen     Tablet .. 650 milliGRAM(s) Oral every 6 hours PRN Temp greater or equal to 38C (100.4F), Mild Pain (1 - 3)  guaiFENesin Oral Liquid (Sugar-Free) 200 milliGRAM(s) Oral every 6 hours PRN Cough  melatonin 3 milliGRAM(s) Oral at bedtime PRN Insomnia      ======Vital Signs======  T(C): 36.6 (25 @ 05:21), Max: 36.6 (25 @ 05:21)  T(F): 97.9 (25 @ 05:21), Max: 97.9 (25 @ 05:21)  HR: 60 (25 @ 05:21) (58 - 62)  BP: 147/78 (25 @ 05:21) (127/66 - 147/78)  BP(mean): --  RR: 18 (25 @ 05:21) (18 - 18)  SpO2: 97% (25 @ 05:21) (97% - 100%)    ======Physical exams======  GENERAL: NAD  Cardiovascular: RRR, S1 S2, no m/r/g, no JVD  LUNGS: Unlabored respiration, CTABL  ABDOMEN: Soft, NTND  EXTREMITIES: Warm extremities, no edema, peripheral pulses 2+ bilaterally  NEURO: AAOx3, PERRLA    ======Labs======                12.5[L]  6.19 >----------< 267  (MCV: 90.9)                39.1   140 | 105 | 31[H]  -----------------------< 96  4.3 | 23 | 1.00    TPro: 6.3 / Alb: 3.6 / TBili: 0.3 / DBili: -- / AlkPhos: 63 / ALT: 10 / AST: 15 (25 @ 06:30)  Ca: 8.7 / Phos: 3.5 / M.00 (25 @ 04:39)          ======Microbiology======        ======I&O's======  I&O's Summary       Patient is a 74y old  Male who presents with a chief complaint of R neck mass (2025 08:05)    ======Overnight/Subjective======  Overnight  No acute events    Subjective  Pt reports he can feel the mass pressing on his throat. Denied pain, SOB, difficulty swallowing.    Pt wants to stay inpatient while recieving oral chemo. He does not want to be discharged on Mon.    PE: There is a large L neck mass present. Non-tender.    ======Medications======  MEDICATIONS  (STANDING):  amLODIPine   Tablet 10 milliGRAM(s) Oral daily  enoxaparin Injectable 60 milliGRAM(s) SubCutaneous every 12 hours  losartan 50 milliGRAM(s) Oral daily  pantoprazole    Tablet 40 milliGRAM(s) Oral before breakfast  polyethylene glycol 3350 17 Gram(s) Oral daily  senna 2 Tablet(s) Oral at bedtime    MEDICATIONS  (PRN):  acetaminophen     Tablet .. 650 milliGRAM(s) Oral every 6 hours PRN Temp greater or equal to 38C (100.4F), Mild Pain (1 - 3)  guaiFENesin Oral Liquid (Sugar-Free) 200 milliGRAM(s) Oral every 6 hours PRN Cough  melatonin 3 milliGRAM(s) Oral at bedtime PRN Insomnia      ======Vital Signs======  T(C): 36.6 (25 @ 05:21), Max: 36.6 (25 @ 05:21)  T(F): 97.9 (25 @ 05:21), Max: 97.9 (25 @ 05:21)  HR: 60 (25 @ 05:21) (58 - 62)  BP: 147/78 (25 @ 05:21) (127/66 - 147/78)  BP(mean): --  RR: 18 (25 @ 05:21) (18 - 18)  SpO2: 97% (25 @ 05:21) (97% - 100%)    ======Physical exams======  GENERAL: NAD  Cardiovascular: RRR, S1 S2, no m/r/g, no JVD  LUNGS: Unlabored respiration, CTABL  ABDOMEN: Soft, NTND  EXTREMITIES: Warm extremities, no edema, peripheral pulses 2+ bilaterally  NEURO: AAOx3, PERRLA    ======Labs======                12.5[L]  6.19 >----------< 267  (MCV: 90.9)                39.1   140 | 105 | 31[H]  -----------------------< 96  4.3 | 23 | 1.00    TPro: 6.3 / Alb: 3.6 / TBili: 0.3 / DBili: -- / AlkPhos: 63 / ALT: 10 / AST: 15 (25 @ 06:30)  Ca: 8.7 / Phos: 3.5 / M.00 (25 @ 04:39)          ======Microbiology======        ======I&O's======  I&O's Summary

## 2025-06-30 ENCOUNTER — TRANSCRIPTION ENCOUNTER (OUTPATIENT)
Age: 75
End: 2025-06-30

## 2025-06-30 LAB
ANION GAP SERPL CALC-SCNC: 9 MMOL/L — SIGNIFICANT CHANGE UP (ref 7–14)
BUN SERPL-MCNC: 31 MG/DL — HIGH (ref 7–23)
CALCIUM SERPL-MCNC: 9.3 MG/DL — SIGNIFICANT CHANGE UP (ref 8.4–10.5)
CHLORIDE SERPL-SCNC: 103 MMOL/L — SIGNIFICANT CHANGE UP (ref 98–107)
CO2 SERPL-SCNC: 25 MMOL/L — SIGNIFICANT CHANGE UP (ref 22–31)
CREAT SERPL-MCNC: 1.01 MG/DL — SIGNIFICANT CHANGE UP (ref 0.5–1.3)
EGFR: 78 ML/MIN/1.73M2 — SIGNIFICANT CHANGE UP
EGFR: 78 ML/MIN/1.73M2 — SIGNIFICANT CHANGE UP
GLUCOSE SERPL-MCNC: 97 MG/DL — SIGNIFICANT CHANGE UP (ref 70–99)
HCT VFR BLD CALC: 38.8 % — LOW (ref 39–50)
HGB BLD-MCNC: 12.4 G/DL — LOW (ref 13–17)
MAGNESIUM SERPL-MCNC: 2 MG/DL — SIGNIFICANT CHANGE UP (ref 1.6–2.6)
MCHC RBC-ENTMCNC: 28.8 PG — SIGNIFICANT CHANGE UP (ref 27–34)
MCHC RBC-ENTMCNC: 32 G/DL — SIGNIFICANT CHANGE UP (ref 32–36)
MCV RBC AUTO: 90.2 FL — SIGNIFICANT CHANGE UP (ref 80–100)
NRBC # BLD AUTO: 0 K/UL — SIGNIFICANT CHANGE UP (ref 0–0)
NRBC # FLD: 0 K/UL — SIGNIFICANT CHANGE UP (ref 0–0)
NRBC BLD AUTO-RTO: 0 /100 WBCS — SIGNIFICANT CHANGE UP (ref 0–0)
PHOSPHATE SERPL-MCNC: 3.9 MG/DL — SIGNIFICANT CHANGE UP (ref 2.5–4.5)
PLATELET # BLD AUTO: 264 K/UL — SIGNIFICANT CHANGE UP (ref 150–400)
PMV BLD: 11.2 FL — SIGNIFICANT CHANGE UP (ref 7–13)
POTASSIUM SERPL-MCNC: 4.4 MMOL/L — SIGNIFICANT CHANGE UP (ref 3.5–5.3)
POTASSIUM SERPL-SCNC: 4.4 MMOL/L — SIGNIFICANT CHANGE UP (ref 3.5–5.3)
RBC # BLD: 4.3 M/UL — SIGNIFICANT CHANGE UP (ref 4.2–5.8)
RBC # FLD: 13.8 % — SIGNIFICANT CHANGE UP (ref 10.3–14.5)
SODIUM SERPL-SCNC: 137 MMOL/L — SIGNIFICANT CHANGE UP (ref 135–145)
WBC # BLD: 6.77 K/UL — SIGNIFICANT CHANGE UP (ref 3.8–10.5)
WBC # FLD AUTO: 6.77 K/UL — SIGNIFICANT CHANGE UP (ref 3.8–10.5)

## 2025-06-30 PROCEDURE — 99233 SBSQ HOSP IP/OBS HIGH 50: CPT | Mod: GC

## 2025-06-30 PROCEDURE — 99232 SBSQ HOSP IP/OBS MODERATE 35: CPT | Mod: GC

## 2025-06-30 RX ORDER — TRAMETINIB 0.05 MG/ML
2 POWDER, FOR SOLUTION ORAL DAILY
Refills: 0 | Status: DISCONTINUED | OUTPATIENT
Start: 2025-06-30 | End: 2025-07-01

## 2025-06-30 RX ORDER — DABRAFENIB 50 MG/1
150 CAPSULE ORAL EVERY 12 HOURS
Refills: 0 | Status: DISCONTINUED | OUTPATIENT
Start: 2025-06-30 | End: 2025-07-01

## 2025-06-30 RX ADMIN — ENOXAPARIN SODIUM 60 MILLIGRAM(S): 100 INJECTION SUBCUTANEOUS at 18:08

## 2025-06-30 RX ADMIN — LOSARTAN POTASSIUM 50 MILLIGRAM(S): 100 TABLET, FILM COATED ORAL at 06:10

## 2025-06-30 RX ADMIN — ENOXAPARIN SODIUM 60 MILLIGRAM(S): 100 INJECTION SUBCUTANEOUS at 06:10

## 2025-06-30 RX ADMIN — TRAMETINIB 2 MILLIGRAM(S): 0.05 POWDER, FOR SOLUTION ORAL at 20:51

## 2025-06-30 RX ADMIN — Medication 40 MILLIGRAM(S): at 06:10

## 2025-06-30 RX ADMIN — DABRAFENIB 150 MILLIGRAM(S): 50 CAPSULE ORAL at 20:49

## 2025-06-30 RX ADMIN — AMLODIPINE BESYLATE 10 MILLIGRAM(S): 10 TABLET ORAL at 06:10

## 2025-06-30 NOTE — DISCHARGE NOTE NURSING/CASE MANAGEMENT/SOCIAL WORK - NSDCFUADDAPPT_GEN_ALL_CORE_FT
APPTS ARE READY TO BE MADE: [ ] YES    Best Family or Patient Contact (if needed):    Additional Information about above appointments (if needed):    1: Primary care provider  2: ENT  3: OMFS    Other comments or requests:

## 2025-06-30 NOTE — DISCHARGE NOTE NURSING/CASE MANAGEMENT/SOCIAL WORK - FINANCIAL ASSISTANCE
[facility] provides services at a reduced cost to those who are determined to be eligible through [facility]’s financial assistance program. Information regarding [facility]’s financial assistance program can be found by going to [email or web address] or by calling [phone].

## 2025-06-30 NOTE — PROGRESS NOTE ADULT - ASSESSMENT
Mr. 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     Imaging Reports  - US Thyroid: Right Lobe: 7.3 x 3.6 x 2.8 cm. Diffusely heterogeneous. 11 mm mid nodule with rim calcification. Focal echo poor area to an upper pole with increased vascularity measuring approximately 3.2 x 1.9 x 2.3 cm Left Lobe: 4.1 x 1.5 x 1.1 cm. Several cysts and colloid nodules. Cervical Lymph Nodes: No enlarged or abnormal morphology cervical nodes. Focal right IJV partially occlusive thrombus  - CT Neck: Right thyroid mass measuring up to 8.0 cm is concerning for neoplasm. There is leftward tracheal deviation with mild luminal narrowing. There is approximately 2 cm substernal extension. Mass effect upon the right IJ results in severe narrowing. Two areas of tumoral invasion into the right IJ as discussed above. Filling defect within the right IJ measures up to 1.3 cm with mixed density, possibly representing mixed tumoral and bland thrombus.  - CT Chest, Abdomen, Pelvis w/ IV Contrast: 1. Left interpole 1.4 cm indeterminate renal lesion, increased in size compared to prior study. Consider further evaluation with MRI as clinically indicated.  2. Additional pancreatic hypodense/cystic lesions. Consider further evaluation with MRCP as clinically indicated. 3. Moderate stool burden, most notably at the rectum with rectal distention and wall thickening, which may represent stercoral proctitis. 4. Prominent right groin lymph node measuring up to 2.8 cm. 5. Partially visualized large heterogeneous multicystic right neck lesion. 6. Incidental partially visualized left vastus medialis intramuscular lipomatous mass measuring 10.6 x 7.5 cm, increased in size compared to prior study. Contrast-enhanced MRI is suggested for further evaluation.  - MRCP: Pancreas could not be evaluated due to significant motion artifacts. The lesion in question in the interpolar region of left kidney demonstrates minimal internal enhancement and may represent a minimally complex cyst. A repeat contrast-enhanced MRI/MRCP is advised on outpatient basis to reevaluate pancreas.  - MR Left Femur: Large benign perineural simple lipoma encasing left obturator neurovascular bundle at its passage through the obturator foramen.    #Newly Diagnosed Anaplastic Thyroid Carcinoma  - IR Biopsy performed 6/18 found to have Anaplastic Thyroid Cancer with BRAF mutation  - Discussed the high risk nature of the location and aggressiveness of tumor with patient. Explained that this tumor can cause rapid airway compromise which can cause difficulty in securing the airway especially in emergent situations which can increase the chance of mortality. 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 All risks explained and clarifying questions answered.     Recommendations  -Script sent by Dr. Mirza for Dabrafenib and Trematinib, in coordination with [facility] to deliver today.   - Will set up appointment outpatient with Dr. Mirza through [facility] and head and neck navigation on discharge.   - ENT evaluation appreciated. Oncology team discussed with patient regarding risk of airway compromise (described above), patient does not want to pursue a tracheostomy, understands risks  - Discussed that oral chemotherapy takes a 2-3 weeks to achieve a steady state.       Ivana Donovan MD  Hematology Oncology Fellow, PGY-4

## 2025-06-30 NOTE — DISCHARGE NOTE NURSING/CASE MANAGEMENT/SOCIAL WORK - PATIENT PORTAL LINK FT
You can access the FollowMyHealth Patient Portal offered by [facility] by registering at the following website: [email or web address]. By joining [employer]’s FollowMyHealth portal, you will also be able to view your health information using other applications (apps) compatible with our system.

## 2025-06-30 NOTE — PROGRESS NOTE ADULT - ASSESSMENT
Mr. Venegas is a 73 yo M with HTN presenting after 6 weeks of ear ache and difficulty swallowing with CT showing nonocclusive thrombus R IJ at the level of thyroid gland, 6 x 6 x 7.6 cm heterogenous partially calcified soft tissue mass in the region of the R thyroid lobe, also involving the isthmus, causing mass effect on the trachea which is displaced to the left side without significant airway compromise. Biopsy of mass positive for anaplastic thyroid carcinoma.

## 2025-06-30 NOTE — DISCHARGE NOTE NURSING/CASE MANAGEMENT/SOCIAL WORK - NSDCPEFALRISK_GEN_ALL_CORE
(4) excellent
For information on Fall & Injury Prevention, visit: [email or web address] OR  [email or web address] OR  https://www.cdc.gov/steadi/patient.html

## 2025-06-30 NOTE — PROGRESS NOTE ADULT - NS ATTEST RISK PROBLEM GEN_ALL_CORE FT
New diagnosis of anaplastic thyroid cancer, immunosuppressed status, hypercoagulable state from underlying malignancy, Severe protein calorie malnutrition

## 2025-06-30 NOTE — PROGRESS NOTE ADULT - PROBLEM SELECTOR PLAN 4
DVT: Heparin full AC  Diet: NPO Pending speech and swallow eval, plan for biopsy  Dispo: Pending biopsy plan DVT: Heparin full AC  Diet: NPO Pending speech and swallow eval, plan for biopsy  Dispo: 7/1 depending on tolerance to chemotherapy

## 2025-06-30 NOTE — PROGRESS NOTE ADULT - ATTENDING COMMENTS
Seen by me this afternoon, having lunch at time of visit, eager to start oral chemo soon and to learn how to inject lovenox.    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

## 2025-06-30 NOTE — PROGRESS NOTE ADULT - SUBJECTIVE AND OBJECTIVE BOX
*******************************  Nafisa Mims, PGY-1  Internal Medicine  Contact via [employer] TEAMS    *******************************    PROGRESS NOTE:     Patient is a 74y old  Male who presents with a chief complaint of R neck mass (29 Jun 2025 07:42)      INTERVAL EVENTS: No acute overnight events.     SUBJECTIVE: Patient seen and examined at bedside. This morning, the patient is comfortable and doing well. No acute complaints. Denies fevers, chills, N/V/D, chest pain, SOB, abdominal pain.    MEDICATIONS  (STANDING):  amLODIPine   Tablet 10 milliGRAM(s) Oral daily  enoxaparin Injectable 60 milliGRAM(s) SubCutaneous every 12 hours  losartan 50 milliGRAM(s) Oral daily  pantoprazole    Tablet 40 milliGRAM(s) Oral before breakfast  polyethylene glycol 3350 17 Gram(s) Oral daily  senna 2 Tablet(s) Oral at bedtime    MEDICATIONS  (PRN):  acetaminophen     Tablet .. 650 milliGRAM(s) Oral every 6 hours PRN Temp greater or equal to 38C (100.4F), Mild Pain (1 - 3)  guaiFENesin Oral Liquid (Sugar-Free) 200 milliGRAM(s) Oral every 6 hours PRN Cough  melatonin 3 milliGRAM(s) Oral at bedtime PRN Insomnia      CAPILLARY BLOOD GLUCOSE        I&O's Summary      PHYSICAL EXAM:  Vital Signs Last 24 Hrs  T(C): 36.4 (30 Jun 2025 06:03), Max: 36.6 (29 Jun 2025 12:48)  T(F): 97.6 (30 Jun 2025 06:03), Max: 97.9 (29 Jun 2025 20:54)  HR: 62 (30 Jun 2025 06:03) (60 - 66)  BP: 166/85 (30 Jun 2025 06:03) (143/71 - 166/85)  BP(mean): --  RR: 17 (30 Jun 2025 06:03) (17 - 18)  SpO2: 97% (30 Jun 2025 06:03) (95% - 98%)    Parameters below as of 30 Jun 2025 06:03  Patient On (Oxygen Delivery Method): room air        GENERAL: NAD, lying in bed comfortably  HEAD: Atraumatic, normocephalic  EYES: EOMI, PERRLA, conjunctiva and sclera clear  ENT: Moist mucous membranes  NECK: Supple, no JVD  HEART: S1, S2, Regular rate and rhythm, no murmurs, rubs, or gallops  LUNGS: Unlabored respirations, clear to auscultation bilaterally, no crackles, wheezing, or rhonchi  ABDOMEN: Soft, nontender, nondistended, +BS  EXTREMITIES: 2+ peripheral pulses bilaterally. No clubbing, cyanosis, or edema  NERVOUS SYSTEM:  A&Ox3, no focal deficits   SKIN: No rashes or lesions    LABS:                      RADIOLOGY & ADDITIONAL TESTS:  Results Reviewed:   Imaging Personally Reviewed:  Electrocardiogram Personally Reviewed:  Tele: *******************************  Nafisa Mims, PGY-1  Internal Medicine  Contact via [employer] TEAMS    *******************************    PROGRESS NOTE:     Patient is a 74y old  Male who presents with a chief complaint of R neck mass (29 Jun 2025 07:42)      INTERVAL EVENTS: No acute overnight events.     SUBJECTIVE: Patient seen and examined at bedside. This morning, the patient is comfortable and doing well. No acute complaints. Denies fevers, chills, N/V/D, chest pain, SOB, abdominal pain.    MEDICATIONS  (STANDING):  amLODIPine   Tablet 10 milliGRAM(s) Oral daily  enoxaparin Injectable 60 milliGRAM(s) SubCutaneous every 12 hours  losartan 50 milliGRAM(s) Oral daily  pantoprazole    Tablet 40 milliGRAM(s) Oral before breakfast  polyethylene glycol 3350 17 Gram(s) Oral daily  senna 2 Tablet(s) Oral at bedtime    MEDICATIONS  (PRN):  acetaminophen     Tablet .. 650 milliGRAM(s) Oral every 6 hours PRN Temp greater or equal to 38C (100.4F), Mild Pain (1 - 3)  guaiFENesin Oral Liquid (Sugar-Free) 200 milliGRAM(s) Oral every 6 hours PRN Cough  melatonin 3 milliGRAM(s) Oral at bedtime PRN Insomnia      CAPILLARY BLOOD GLUCOSE        I&O's Summary      PHYSICAL EXAM:  Vital Signs Last 24 Hrs  T(C): 36.4 (30 Jun 2025 06:03), Max: 36.6 (29 Jun 2025 12:48)  T(F): 97.6 (30 Jun 2025 06:03), Max: 97.9 (29 Jun 2025 20:54)  HR: 62 (30 Jun 2025 06:03) (60 - 66)  BP: 166/85 (30 Jun 2025 06:03) (143/71 - 166/85)  BP(mean): --  RR: 17 (30 Jun 2025 06:03) (17 - 18)  SpO2: 97% (30 Jun 2025 06:03) (95% - 98%)    Parameters below as of 30 Jun 2025 06:03  Patient On (Oxygen Delivery Method): room air        GENERAL: NAD, sitting in bed comfortably  HEAD: Atraumatic, normocephalic  EYES: conjunctiva and sclera clear  NECK: Supple  HEART: S1, S2, Regular rate and rhythm, no murmurs, rubs, or gallops  LUNGS: Unlabored respirations, clear to auscultation bilaterally, no crackles, wheezing, or rhonchi  ABDOMEN: Soft, nontender, nondistended  EXTREMITIES: 2+ peripheral pulses bilaterally. No edema  NERVOUS SYSTEM:  A&Ox3   SKIN: No rashes or lesions    LABS:                      RADIOLOGY & ADDITIONAL TESTS:  Results Reviewed:   Imaging Personally Reviewed:  Electrocardiogram Personally Reviewed:  Tele:

## 2025-06-30 NOTE — PROGRESS NOTE ADULT - PROBLEM SELECTOR PLAN 1
Patient presenting after 6 weeks of ear ache and difficulty swallowing with outpatient sonogram showing neck mass and jugular vein clot  - CT showing nonocclusive thrombus R IJ at the level of thyroid gland, 6 x 6 x 7.6 cm heterogenous partially calcified soft tissue mass in the region of the R thyroid lobe, also involving the isthmus, causing mass effect on the trachea which is displaced to the left side without significant airway compromise  - Thyroid labs notably WNL; TSH 4.07, T4 9.13, T3 total 107  - Biopsy done, path showed thyroid anaplastic carcinoma  - ENT following, appreciate recs  - Heme/onc following, appreciate recs  - S + S eval for associated dysphagia  - Patient will begin receiving chemo tx outpatient on Mon, pt prefers to stay in hospital until Mon Patient presenting after 6 weeks of ear ache and difficulty swallowing with outpatient sonogram showing neck mass and jugular vein clot  - CT showing nonocclusive thrombus R IJ at the level of thyroid gland, 6 x 6 x 7.6 cm heterogenous partially calcified soft tissue mass in the region of the R thyroid lobe, also involving the isthmus, causing mass effect on the trachea which is displaced to the left side without significant airway compromise  - Thyroid labs notably WNL; TSH 4.07, T4 9.13, T3 total 107  - Biopsy done, path showed thyroid anaplastic carcinoma  - ENT following, appreciate recs  - Heme/onc following, appreciate recs  - S + S eval for associated dysphagia    Update:  - Patient will begin receiving chemo tx on 6/30. Pt prefers to stay in hospital until 7/1 due to potential side effects of starting chemo.

## 2025-06-30 NOTE — PROGRESS NOTE ADULT - ATTENDING COMMENTS
Mr. Venegas has a newly diagnosed Anaplastic Thyroid Carcinoma with an actionable V600E mutation. Fully agree with Dr. Mirza and Dr. Donovan's recommendation for emergent initiation of therapy to ideally minimize the risk of airway compromise at this time; I note he has declined a tracheostomy. Every effort has been made to arrange for pharmacy to deliver Dabrafenib and Trametinib to [facility] today to commence treatment. Follow up appointment in the outpatient with Dr. Mirza through [facility] and head and neck navigation on discharge has been planned.

## 2025-07-01 VITALS
HEART RATE: 90 BPM | SYSTOLIC BLOOD PRESSURE: 122 MMHG | RESPIRATION RATE: 18 BRPM | DIASTOLIC BLOOD PRESSURE: 68 MMHG | OXYGEN SATURATION: 97 % | TEMPERATURE: 98 F

## 2025-07-01 LAB
ALBUMIN SERPL ELPH-MCNC: 3.4 G/DL — SIGNIFICANT CHANGE UP (ref 3.3–5)
ALP SERPL-CCNC: 55 U/L — SIGNIFICANT CHANGE UP (ref 40–120)
ALT FLD-CCNC: 33 U/L — SIGNIFICANT CHANGE UP (ref 4–41)
ANION GAP SERPL CALC-SCNC: 6 MMOL/L — LOW (ref 7–14)
AST SERPL-CCNC: 19 U/L — SIGNIFICANT CHANGE UP (ref 4–40)
BASOPHILS # BLD AUTO: 0.04 K/UL — SIGNIFICANT CHANGE UP (ref 0–0.2)
BASOPHILS NFR BLD AUTO: 0.5 % — SIGNIFICANT CHANGE UP (ref 0–2)
BILIRUB SERPL-MCNC: 0.3 MG/DL — SIGNIFICANT CHANGE UP (ref 0.2–1.2)
BUN SERPL-MCNC: 29 MG/DL — HIGH (ref 7–23)
CALCIUM SERPL-MCNC: 9.5 MG/DL — SIGNIFICANT CHANGE UP (ref 8.4–10.5)
CHLORIDE SERPL-SCNC: 104 MMOL/L — SIGNIFICANT CHANGE UP (ref 98–107)
CO2 SERPL-SCNC: 26 MMOL/L — SIGNIFICANT CHANGE UP (ref 22–31)
CREAT SERPL-MCNC: 1.03 MG/DL — SIGNIFICANT CHANGE UP (ref 0.5–1.3)
EGFR: 76 ML/MIN/1.73M2 — SIGNIFICANT CHANGE UP
EGFR: 76 ML/MIN/1.73M2 — SIGNIFICANT CHANGE UP
EOSINOPHIL # BLD AUTO: 0.13 K/UL — SIGNIFICANT CHANGE UP (ref 0–0.5)
EOSINOPHIL NFR BLD AUTO: 1.5 % — SIGNIFICANT CHANGE UP (ref 0–6)
GLUCOSE SERPL-MCNC: 93 MG/DL — SIGNIFICANT CHANGE UP (ref 70–99)
HCT VFR BLD CALC: 38.8 % — LOW (ref 39–50)
HGB BLD-MCNC: 12.8 G/DL — LOW (ref 13–17)
IMM GRANULOCYTES # BLD AUTO: 0.09 K/UL — HIGH (ref 0–0.07)
IMM GRANULOCYTES NFR BLD AUTO: 1 % — HIGH (ref 0–0.9)
LYMPHOCYTES # BLD AUTO: 1.28 K/UL — SIGNIFICANT CHANGE UP (ref 1–3.3)
LYMPHOCYTES NFR BLD AUTO: 14.8 % — SIGNIFICANT CHANGE UP (ref 13–44)
MAGNESIUM SERPL-MCNC: 1.9 MG/DL — SIGNIFICANT CHANGE UP (ref 1.6–2.6)
MCHC RBC-ENTMCNC: 29.6 PG — SIGNIFICANT CHANGE UP (ref 27–34)
MCHC RBC-ENTMCNC: 33 G/DL — SIGNIFICANT CHANGE UP (ref 32–36)
MCV RBC AUTO: 89.8 FL — SIGNIFICANT CHANGE UP (ref 80–100)
MONOCYTES # BLD AUTO: 0.82 K/UL — SIGNIFICANT CHANGE UP (ref 0–0.9)
MONOCYTES NFR BLD AUTO: 9.5 % — SIGNIFICANT CHANGE UP (ref 2–14)
NEUTROPHILS # BLD AUTO: 6.26 K/UL — SIGNIFICANT CHANGE UP (ref 1.8–7.4)
NEUTROPHILS NFR BLD AUTO: 72.7 % — SIGNIFICANT CHANGE UP (ref 43–77)
NRBC # BLD AUTO: 0 K/UL — SIGNIFICANT CHANGE UP (ref 0–0)
NRBC # FLD: 0 K/UL — SIGNIFICANT CHANGE UP (ref 0–0)
NRBC BLD AUTO-RTO: 0 /100 WBCS — SIGNIFICANT CHANGE UP (ref 0–0)
PHOSPHATE SERPL-MCNC: 3.5 MG/DL — SIGNIFICANT CHANGE UP (ref 2.5–4.5)
PLATELET # BLD AUTO: 250 K/UL — SIGNIFICANT CHANGE UP (ref 150–400)
PMV BLD: 11.1 FL — SIGNIFICANT CHANGE UP (ref 7–13)
POTASSIUM SERPL-MCNC: 4.3 MMOL/L — SIGNIFICANT CHANGE UP (ref 3.5–5.3)
POTASSIUM SERPL-SCNC: 4.3 MMOL/L — SIGNIFICANT CHANGE UP (ref 3.5–5.3)
PROT SERPL-MCNC: 6 G/DL — SIGNIFICANT CHANGE UP (ref 6–8.3)
RBC # BLD: 4.32 M/UL — SIGNIFICANT CHANGE UP (ref 4.2–5.8)
RBC # FLD: 13.7 % — SIGNIFICANT CHANGE UP (ref 10.3–14.5)
SODIUM SERPL-SCNC: 136 MMOL/L — SIGNIFICANT CHANGE UP (ref 135–145)
WBC # BLD: 8.62 K/UL — SIGNIFICANT CHANGE UP (ref 3.8–10.5)
WBC # FLD AUTO: 8.62 K/UL — SIGNIFICANT CHANGE UP (ref 3.8–10.5)

## 2025-07-01 PROCEDURE — 99239 HOSP IP/OBS DSCHRG MGMT >30: CPT

## 2025-07-01 RX ORDER — DABRAFENIB 50 MG/1
2 CAPSULE ORAL
Qty: 0 | Refills: 0 | DISCHARGE
Start: 2025-07-01

## 2025-07-01 RX ORDER — LOSARTAN POTASSIUM 100 MG/1
1 TABLET, FILM COATED ORAL
Qty: 30 | Refills: 0
Start: 2025-07-01 | End: 2025-07-30

## 2025-07-01 RX ORDER — AMLODIPINE BESYLATE 10 MG/1
1 TABLET ORAL
Qty: 30 | Refills: 0
Start: 2025-07-01 | End: 2025-07-30

## 2025-07-01 RX ORDER — AMLODIPINE BESYLATE 10 MG/1
1 TABLET ORAL
Refills: 0 | DISCHARGE

## 2025-07-01 RX ORDER — ENOXAPARIN SODIUM 100 MG/ML
60 INJECTION SUBCUTANEOUS
Qty: 60 | Refills: 0
Start: 2025-07-01 | End: 2025-07-30

## 2025-07-01 RX ORDER — ENOXAPARIN SODIUM 100 MG/ML
100 INJECTION SUBCUTANEOUS
Refills: 0
Start: 2025-07-01

## 2025-07-01 RX ORDER — TRAMETINIB 0.05 MG/ML
1 POWDER, FOR SOLUTION ORAL
Qty: 0 | Refills: 0 | DISCHARGE
Start: 2025-07-01

## 2025-07-01 RX ADMIN — AMLODIPINE BESYLATE 10 MILLIGRAM(S): 10 TABLET ORAL at 05:50

## 2025-07-01 RX ADMIN — ENOXAPARIN SODIUM 60 MILLIGRAM(S): 100 INJECTION SUBCUTANEOUS at 05:50

## 2025-07-01 RX ADMIN — DABRAFENIB 150 MILLIGRAM(S): 50 CAPSULE ORAL at 12:22

## 2025-07-01 RX ADMIN — ENOXAPARIN SODIUM 60 MILLIGRAM(S): 100 INJECTION SUBCUTANEOUS at 18:14

## 2025-07-01 RX ADMIN — LOSARTAN POTASSIUM 50 MILLIGRAM(S): 100 TABLET, FILM COATED ORAL at 05:49

## 2025-07-01 NOTE — PROGRESS NOTE ADULT - ATTENDING COMMENTS
Seen by me this afternoon, doing well, learned how to self-inject lovenox, also received his 1st doses of oral chemo (Dabrafenib and Trematinib), amenable to going home today.    Diagnosed Anaplastic thyroid carcinoma w/ BRAF V600 Mutation Detected, c/w Dabrafenib and Trematinib  C/w lovenox teaching for RIJ thrombus, hypercoagulable state from underlying malignancy  Immunosuppressed status  Severe protein calorie malnutrition/underweight  Medically stable for discharge home today with Oncology and ENT f/up as outpatient, apprec CM assistance  >30 MIN DC PLANNING  Rest as above

## 2025-07-01 NOTE — PROGRESS NOTE ADULT - PROVIDER SPECIALTY LIST ADULT
Internal Medicine
Internal Medicine
Heme/Onc
Internal Medicine

## 2025-07-01 NOTE — PROGRESS NOTE ADULT - REASON FOR ADMISSION
R neck mass

## 2025-07-01 NOTE — PROGRESS NOTE ADULT - PROBLEM SELECTOR PLAN 2
Patient with R IJ nonocclusive thrombus in setting of neck mass  - Suspect due to local mass effect and compression of distal R IJ and possible hypercoagulable state   - c/w IM Lovenox Patient with R IJ nonocclusive thrombus in setting of neck mass  - Suspect due to local mass effect and compression of distal R IJ and possible hypercoagulable state   - c/w IM Lovenox    Update:  - Lovenox training completed, patient to be discharged on Lovenox.

## 2025-07-01 NOTE — PROGRESS NOTE ADULT - PROBLEM SELECTOR PROBLEM 2
Jugular vein thrombosis

## 2025-07-01 NOTE — PROGRESS NOTE ADULT - PROBLEM SELECTOR PLAN 4
DVT: Heparin full AC  Diet: NPO Pending speech and swallow eval, plan for biopsy  Dispo: 7/1 depending on tolerance to chemotherapy

## 2025-07-01 NOTE — PROGRESS NOTE ADULT - SUBJECTIVE AND OBJECTIVE BOX
*******************************  Nafisa Mims, PGY-1  Internal Medicine  Contact via [employer] TEAMS    *******************************    PROGRESS NOTE:     Patient is a 74y old  Male who presents with a chief complaint of R neck mass (30 Jun 2025 14:16)      INTERVAL EVENTS: No acute overnight events.     SUBJECTIVE: Patient seen and examined at bedside. This morning, the patient is comfortable and doing well. No acute complaints. Denies fevers, chills, N/V/D, chest pain, SOB, abdominal pain.    MEDICATIONS  (STANDING):  amLODIPine   Tablet 10 milliGRAM(s) Oral daily  dabrafenib 150 milliGRAM(s) Oral every 12 hours  enoxaparin Injectable 60 milliGRAM(s) SubCutaneous every 12 hours  losartan 50 milliGRAM(s) Oral daily  pantoprazole    Tablet 40 milliGRAM(s) Oral before breakfast  polyethylene glycol 3350 17 Gram(s) Oral daily  senna 2 Tablet(s) Oral at bedtime  trametinib 2 milliGRAM(s) Oral daily    MEDICATIONS  (PRN):  acetaminophen     Tablet .. 650 milliGRAM(s) Oral every 6 hours PRN Temp greater or equal to 38C (100.4F), Mild Pain (1 - 3)  guaiFENesin Oral Liquid (Sugar-Free) 200 milliGRAM(s) Oral every 6 hours PRN Cough  melatonin 3 milliGRAM(s) Oral at bedtime PRN Insomnia      CAPILLARY BLOOD GLUCOSE        I&O's Summary      PHYSICAL EXAM:  Vital Signs Last 24 Hrs  T(C): 36.3 (01 Jul 2025 05:34), Max: 36.8 (30 Jun 2025 13:00)  T(F): 97.4 (01 Jul 2025 05:34), Max: 98.2 (30 Jun 2025 13:00)  HR: 58 (01 Jul 2025 05:34) (58 - 76)  BP: 158/83 (01 Jul 2025 05:34) (113/56 - 158/83)  BP(mean): --  RR: 18 (01 Jul 2025 05:34) (18 - 18)  SpO2: 98% (01 Jul 2025 05:34) (97% - 98%)    Parameters below as of 01 Jul 2025 05:34  Patient On (Oxygen Delivery Method): room air        GENERAL: NAD, lying in bed comfortably  HEAD: Atraumatic, normocephalic  EYES: EOMI, PERRLA, conjunctiva and sclera clear  ENT: Moist mucous membranes  NECK: Supple, no JVD  HEART: S1, S2, Regular rate and rhythm, no murmurs, rubs, or gallops  LUNGS: Unlabored respirations, clear to auscultation bilaterally, no crackles, wheezing, or rhonchi  ABDOMEN: Soft, nontender, nondistended, +BS  EXTREMITIES: 2+ peripheral pulses bilaterally. No clubbing, cyanosis, or edema  NERVOUS SYSTEM:  A&Ox3, no focal deficits   SKIN: No rashes or lesions    LABS:                        12.8   8.62  )-----------( 250      ( 01 Jul 2025 05:30 )             38.8     06-30    137  |  103  |  31[H]  ----------------------------<  97  4.4   |  25  |  1.01    Ca    9.3      30 Jun 2025 06:10  Phos  3.9     06-30  Mg     2.00     06-30            Urinalysis Basic - ( 30 Jun 2025 06:10 )    Color: x / Appearance: x / SG: x / pH: x  Gluc: 97 mg/dL / Ketone: x  / Bili: x / Urobili: x   Blood: x / Protein: x / Nitrite: x   Leuk Esterase: x / RBC: x / WBC x   Sq Epi: x / Non Sq Epi: x / Bacteria: x          RADIOLOGY & ADDITIONAL TESTS:  Results Reviewed:   Imaging Personally Reviewed:  Electrocardiogram Personally Reviewed:  Tele: *******************************  Nafisa Mims, PGY-1  Internal Medicine  Contact via [employer] TEAMS    *******************************    PROGRESS NOTE:     Patient is a 74y old  Male who presents with a chief complaint of R neck mass (30 Jun 2025 14:16)      INTERVAL EVENTS: No acute overnight events.     SUBJECTIVE: Patient seen and examined at bedside. This morning, the patient is comfortable and doing well. No acute complaints. Denies fevers, chills, N/V/D, chest pain, SOB, abdominal pain.    MEDICATIONS  (STANDING):  amLODIPine   Tablet 10 milliGRAM(s) Oral daily  dabrafenib 150 milliGRAM(s) Oral every 12 hours  enoxaparin Injectable 60 milliGRAM(s) SubCutaneous every 12 hours  losartan 50 milliGRAM(s) Oral daily  pantoprazole    Tablet 40 milliGRAM(s) Oral before breakfast  polyethylene glycol 3350 17 Gram(s) Oral daily  senna 2 Tablet(s) Oral at bedtime  trametinib 2 milliGRAM(s) Oral daily    MEDICATIONS  (PRN):  acetaminophen     Tablet .. 650 milliGRAM(s) Oral every 6 hours PRN Temp greater or equal to 38C (100.4F), Mild Pain (1 - 3)  guaiFENesin Oral Liquid (Sugar-Free) 200 milliGRAM(s) Oral every 6 hours PRN Cough  melatonin 3 milliGRAM(s) Oral at bedtime PRN Insomnia      CAPILLARY BLOOD GLUCOSE        I&O's Summary      PHYSICAL EXAM:  Vital Signs Last 24 Hrs  T(C): 36.3 (01 Jul 2025 05:34), Max: 36.8 (30 Jun 2025 13:00)  T(F): 97.4 (01 Jul 2025 05:34), Max: 98.2 (30 Jun 2025 13:00)  HR: 58 (01 Jul 2025 05:34) (58 - 76)  BP: 158/83 (01 Jul 2025 05:34) (113/56 - 158/83)  BP(mean): --  RR: 18 (01 Jul 2025 05:34) (18 - 18)  SpO2: 98% (01 Jul 2025 05:34) (97% - 98%)    Parameters below as of 01 Jul 2025 05:34  Patient On (Oxygen Delivery Method): room air        GENERAL: NAD, lying in bed comfortably  HEAD: Atraumatic, normocephalic  EYES: conjunctiva and sclera clear  NECK: Supple  HEART: S1, S2, Regular rate and rhythm, no murmurs, rubs, or gallops  LUNGS: Unlabored respirations, clear to auscultation bilaterally, no crackles, wheezing, or rhonchi  ABDOMEN: Soft, nontender, nondistended  EXTREMITIES: 2+ peripheral pulses bilaterally. No clubbing, cyanosis, or edema  NERVOUS SYSTEM:  A&Ox3  SKIN: No rashes or lesions    LABS:                        12.8   8.62  )-----------( 250      ( 01 Jul 2025 05:30 )             38.8     06-30    137  |  103  |  31[H]  ----------------------------<  97  4.4   |  25  |  1.01    Ca    9.3      30 Jun 2025 06:10  Phos  3.9     06-30  Mg     2.00     06-30            Urinalysis Basic - ( 30 Jun 2025 06:10 )    Color: x / Appearance: x / SG: x / pH: x  Gluc: 97 mg/dL / Ketone: x  / Bili: x / Urobili: x   Blood: x / Protein: x / Nitrite: x   Leuk Esterase: x / RBC: x / WBC x   Sq Epi: x / Non Sq Epi: x / Bacteria: x          RADIOLOGY & ADDITIONAL TESTS:  Results Reviewed:   Imaging Personally Reviewed:  Electrocardiogram Personally Reviewed:  Tele:

## 2025-07-01 NOTE — PROGRESS NOTE ADULT - NUTRITIONAL ASSESSMENT
This patient has been assessed with a concern for Malnutrition and has been determined to have a diagnosis/diagnoses of Severe protein-calorie malnutrition and Underweight (BMI < 19).    This patient is being managed with:   Diet Soft and Bite Sized-  Moderately Thick Liquids (MODTHICKLIQS)  Kosher  Supplement Feeding Modality:  Oral  Ensure Max Cans or Servings Per Day:  1       Frequency:  Daily  Entered: Jun 17 2025 10:38AM  
This patient has been assessed with a concern for Malnutrition and has been determined to have a diagnosis/diagnoses of Underweight (BMI < 19) and Severe protein-calorie malnutrition.    This patient is being managed with:   Diet Soft and Bite Sized-  Moderately Thick Liquids (MODTHICKLIQS)  Kosher  Supplement Feeding Modality:  Oral  Ensure Max Cans or Servings Per Day:  1       Frequency:  Daily  Entered: Jun 17 2025 10:38AM  
This patient has been assessed with a concern for Malnutrition and has been determined to have a diagnosis/diagnoses of Severe protein-calorie malnutrition and Underweight (BMI < 19).    This patient is being managed with:   Diet Pureed-  Mildly Thick Liquids (MILDTHICKLIQS)  Kelli  Entered: Jun 14 2025 12:34PM  
This patient has been assessed with a concern for Malnutrition and has been determined to have a diagnosis/diagnoses of Severe protein-calorie malnutrition and Underweight (BMI < 19).    This patient is being managed with:   Diet Pureed-  Mildly Thick Liquids (MILDTHICKLIQS)  Kelli  Entered: Jun 25 2025  5:05PM  
This patient has been assessed with a concern for Malnutrition and has been determined to have a diagnosis/diagnoses of Severe protein-calorie malnutrition and Underweight (BMI < 19).    This patient is being managed with:   Diet Soft and Bite Sized-  Moderately Thick Liquids (MODTHICKLIQS)  Entered: Jun 16 2025 12:43PM  
This patient has been assessed with a concern for Malnutrition and has been determined to have a diagnosis/diagnoses of Severe protein-calorie malnutrition and Underweight (BMI < 19).    This patient is being managed with:   Diet Pureed-  Mildly Thick Liquids (MILDTHICKLIQS)  Kosher  Supplement Feeding Modality:  Oral  Ensure Enlive Cans or Servings Per Day:  1       Frequency:  Three Times a day  Entered: Jun 26 2025  5:33PM  
This patient has been assessed with a concern for Malnutrition and has been determined to have a diagnosis/diagnoses of Severe protein-calorie malnutrition and Underweight (BMI < 19).    This patient is being managed with:   Diet Soft and Bite Sized-  Moderately Thick Liquids (MODTHICKLIQS)  Kosher  Supplement Feeding Modality:  Oral  Ensure Max Cans or Servings Per Day:  1       Frequency:  Daily  Entered: Jun 17 2025 10:38AM  
This patient has been assessed with a concern for Malnutrition and has been determined to have a diagnosis/diagnoses of Underweight (BMI < 19) and Severe protein-calorie malnutrition.    This patient is being managed with:   Diet Soft and Bite Sized-  Moderately Thick Liquids (MODTHICKLIQS)  Kosher  Supplement Feeding Modality:  Oral  Ensure Max Cans or Servings Per Day:  1       Frequency:  Daily  Entered: Jun 17 2025 10:38AM  
This patient has been assessed with a concern for Malnutrition and has been determined to have a diagnosis/diagnoses of Severe protein-calorie malnutrition and Underweight (BMI < 19).    This patient is being managed with:   Diet Pureed-  Mildly Thick Liquids (MILDTHICKLIQS)  Kosher  Supplement Feeding Modality:  Oral  Ensure Enlive Cans or Servings Per Day:  1       Frequency:  Three Times a day  Entered: Jun 26 2025  5:33PM  
This patient has been assessed with a concern for Malnutrition and has been determined to have a diagnosis/diagnoses of Severe protein-calorie malnutrition and Underweight (BMI < 19).    This patient is being managed with:   Diet Pureed-  Mildly Thick Liquids (MILDTHICKLIQS)  Kosher  Supplement Feeding Modality:  Oral  Ensure Enlive Cans or Servings Per Day:  1       Frequency:  Three Times a day  Entered: Jun 26 2025  5:33PM  
This patient has been assessed with a concern for Malnutrition and has been determined to have a diagnosis/diagnoses of Severe protein-calorie malnutrition and Underweight (BMI < 19).    This patient is being managed with:   Diet Soft and Bite Sized-  Moderately Thick Liquids (MODTHICKLIQS)  Kosher  Supplement Feeding Modality:  Oral  Ensure Max Cans or Servings Per Day:  1       Frequency:  Daily  Entered: Jun 17 2025 10:38AM  
This patient has been assessed with a concern for Malnutrition and has been determined to have a diagnosis/diagnoses of Severe protein-calorie malnutrition and Underweight (BMI < 19).    This patient is being managed with:   Diet Soft and Bite Sized-  Moderately Thick Liquids (MODTHICKLIQS)  Kosher  Supplement Feeding Modality:  Oral  Ensure Enlive Cans or Servings Per Day:  1       Frequency:  Three Times a day  Entered: Jun 24 2025  1:07PM  
This patient has been assessed with a concern for Malnutrition and has been determined to have a diagnosis/diagnoses of Severe protein-calorie malnutrition and Underweight (BMI < 19).    This patient is being managed with:   Diet Soft and Bite Sized-  Moderately Thick Liquids (MODTHICKLIQS)  Kelli  Entered: Jun 23 2025 12:05PM  
This patient has been assessed with a concern for Malnutrition and has been determined to have a diagnosis/diagnoses of Severe protein-calorie malnutrition and Underweight (BMI < 19).    This patient is being managed with:   Diet Pureed-  Mildly Thick Liquids (MILDTHICKLIQS)  Kosher  Supplement Feeding Modality:  Oral  Ensure Enlive Cans or Servings Per Day:  1       Frequency:  Three Times a day  Entered: Jun 26 2025  5:33PM  
This patient has been assessed with a concern for Malnutrition and has been determined to have a diagnosis/diagnoses of Severe protein-calorie malnutrition and Underweight (BMI < 19).    This patient is being managed with:   Diet Pureed-  Mildly Thick Liquids (MILDTHICKLIQS)  Kosher  Supplement Feeding Modality:  Oral  Ensure Enlive Cans or Servings Per Day:  1       Frequency:  Three Times a day  Entered: Jun 26 2025  5:33PM  
This patient has been assessed with a concern for Malnutrition and has been determined to have a diagnosis/diagnoses of Underweight (BMI < 19) and Severe protein-calorie malnutrition.    This patient is being managed with:   Diet Soft and Bite Sized-  Moderately Thick Liquids (MODTHICKLIQS)  Kosher  Supplement Feeding Modality:  Oral  Ensure Max Cans or Servings Per Day:  1       Frequency:  Daily  Entered: Jun 17 2025 10:38AM

## 2025-07-01 NOTE — PROGRESS NOTE ADULT - PROBLEM SELECTOR PROBLEM 1
Mass of right side of neck

## 2025-07-02 ENCOUNTER — APPOINTMENT (OUTPATIENT)
Age: 75
End: 2025-07-02

## 2025-07-02 PROCEDURE — 99203 OFFICE O/P NEW LOW 30 MIN: CPT

## 2025-07-05 ENCOUNTER — OUTPATIENT (OUTPATIENT)
Dept: OUTPATIENT SERVICES | Facility: HOSPITAL | Age: 75
LOS: 1 days | Discharge: HOME | End: 2025-07-05

## 2025-07-05 DIAGNOSIS — Z98.890 OTHER SPECIFIED POSTPROCEDURAL STATES: Chronic | ICD-10-CM

## 2025-07-05 DIAGNOSIS — C73 MALIGNANT NEOPLASM OF THYROID GLAND: ICD-10-CM

## 2025-07-07 ENCOUNTER — NON-APPOINTMENT (OUTPATIENT)
Age: 75
End: 2025-07-07

## 2025-07-07 ENCOUNTER — APPOINTMENT (OUTPATIENT)
Dept: HEMATOLOGY ONCOLOGY | Facility: CLINIC | Age: 75
End: 2025-07-07
Payer: MEDICARE

## 2025-07-07 PROBLEM — C73: Status: ACTIVE | Noted: 2025-06-27

## 2025-07-07 PROCEDURE — 99205 OFFICE O/P NEW HI 60 MIN: CPT | Mod: 93

## 2025-07-07 PROCEDURE — G2211 COMPLEX E/M VISIT ADD ON: CPT | Mod: 93

## 2025-07-07 RX ORDER — CLINDAMYCIN HYDROCHLORIDE 300 MG/1
300 CAPSULE ORAL EVERY 6 HOURS
Qty: 28 | Refills: 0 | Status: ACTIVE | COMMUNITY
Start: 2025-07-07 | End: 1900-01-01

## 2025-07-08 ENCOUNTER — OUTPATIENT (OUTPATIENT)
Dept: OUTPATIENT SERVICES | Facility: HOSPITAL | Age: 75
LOS: 1 days | End: 2025-07-08
Payer: MEDICARE

## 2025-07-08 ENCOUNTER — APPOINTMENT (OUTPATIENT)
Dept: MRI IMAGING | Facility: IMAGING CENTER | Age: 75
End: 2025-07-08
Payer: MEDICARE

## 2025-07-08 DIAGNOSIS — C73 MALIGNANT NEOPLASM OF THYROID GLAND: ICD-10-CM

## 2025-07-08 DIAGNOSIS — Z98.890 OTHER SPECIFIED POSTPROCEDURAL STATES: Chronic | ICD-10-CM

## 2025-07-08 PROCEDURE — A9585: CPT

## 2025-07-08 PROCEDURE — 70553 MRI BRAIN STEM W/O & W/DYE: CPT | Mod: 26

## 2025-07-08 PROCEDURE — 70553 MRI BRAIN STEM W/O & W/DYE: CPT

## 2025-07-11 PROCEDURE — 20206 BIOPSY MUSCLE PERQ NEEDLE: CPT

## 2025-07-11 PROCEDURE — 76942 ECHO GUIDE FOR BIOPSY: CPT | Mod: 26

## 2025-07-12 ENCOUNTER — INPATIENT (INPATIENT)
Facility: HOSPITAL | Age: 75
LOS: 4 days | Discharge: NURSING, REHAB OR LONG-TERM CARE | DRG: 605 | End: 2025-07-17
Attending: STUDENT IN AN ORGANIZED HEALTH CARE EDUCATION/TRAINING PROGRAM | Admitting: STUDENT IN AN ORGANIZED HEALTH CARE EDUCATION/TRAINING PROGRAM
Payer: MEDICARE

## 2025-07-12 VITALS
OXYGEN SATURATION: 97 % | HEART RATE: 62 BPM | DIASTOLIC BLOOD PRESSURE: 61 MMHG | HEIGHT: 61 IN | WEIGHT: 128.09 LBS | TEMPERATURE: 98 F | SYSTOLIC BLOOD PRESSURE: 120 MMHG | RESPIRATION RATE: 16 BRPM

## 2025-07-12 DIAGNOSIS — Z98.890 OTHER SPECIFIED POSTPROCEDURAL STATES: Chronic | ICD-10-CM

## 2025-07-12 DIAGNOSIS — S30.1XXA CONTUSION OF ABDOMINAL WALL, INITIAL ENCOUNTER: ICD-10-CM

## 2025-07-12 LAB
ALBUMIN SERPL ELPH-MCNC: 3.2 G/DL — LOW (ref 3.5–5)
ALP SERPL-CCNC: 55 U/L — SIGNIFICANT CHANGE UP (ref 40–120)
ALT FLD-CCNC: 60 U/L DA — SIGNIFICANT CHANGE UP (ref 10–60)
ANION GAP SERPL CALC-SCNC: 3 MMOL/L — LOW (ref 5–17)
APTT BLD: 36.2 SEC — SIGNIFICANT CHANGE UP (ref 26.1–36.8)
AST SERPL-CCNC: 36 U/L — SIGNIFICANT CHANGE UP (ref 10–40)
BASOPHILS # BLD AUTO: 0.03 K/UL — SIGNIFICANT CHANGE UP (ref 0–0.2)
BASOPHILS # BLD AUTO: 0.03 K/UL — SIGNIFICANT CHANGE UP (ref 0–0.2)
BASOPHILS NFR BLD AUTO: 0.4 % — SIGNIFICANT CHANGE UP (ref 0–2)
BASOPHILS NFR BLD AUTO: 0.4 % — SIGNIFICANT CHANGE UP (ref 0–2)
BILIRUB SERPL-MCNC: 0.4 MG/DL — SIGNIFICANT CHANGE UP (ref 0.2–1.2)
BLD GP AB SCN SERPL QL: SIGNIFICANT CHANGE UP
BUN SERPL-MCNC: 39 MG/DL — HIGH (ref 7–18)
CALCIUM SERPL-MCNC: 9 MG/DL — SIGNIFICANT CHANGE UP (ref 8.4–10.5)
CHLORIDE SERPL-SCNC: 106 MMOL/L — SIGNIFICANT CHANGE UP (ref 96–108)
CO2 SERPL-SCNC: 27 MMOL/L — SIGNIFICANT CHANGE UP (ref 22–31)
CREAT SERPL-MCNC: 1.39 MG/DL — HIGH (ref 0.5–1.3)
EGFR: 53 ML/MIN/1.73M2 — LOW
EGFR: 53 ML/MIN/1.73M2 — LOW
EOSINOPHIL # BLD AUTO: 0.01 K/UL — SIGNIFICANT CHANGE UP (ref 0–0.5)
EOSINOPHIL # BLD AUTO: 0.02 K/UL — SIGNIFICANT CHANGE UP (ref 0–0.5)
EOSINOPHIL NFR BLD AUTO: 0.1 % — SIGNIFICANT CHANGE UP (ref 0–6)
EOSINOPHIL NFR BLD AUTO: 0.2 % — SIGNIFICANT CHANGE UP (ref 0–6)
GLUCOSE SERPL-MCNC: 126 MG/DL — HIGH (ref 70–99)
HCT VFR BLD CALC: 27.5 % — LOW (ref 39–50)
HCT VFR BLD CALC: 27.8 % — LOW (ref 39–50)
HCT VFR BLD CALC: 30.5 % — LOW (ref 39–50)
HGB BLD-MCNC: 9 G/DL — LOW (ref 13–17)
HGB BLD-MCNC: 9.2 G/DL — LOW (ref 13–17)
HGB BLD-MCNC: 9.9 G/DL — LOW (ref 13–17)
IMM GRANULOCYTES NFR BLD AUTO: 0.9 % — SIGNIFICANT CHANGE UP (ref 0–0.9)
IMM GRANULOCYTES NFR BLD AUTO: 1 % — HIGH (ref 0–0.9)
INR BLD: 1.2 RATIO — HIGH (ref 0.85–1.16)
LACTATE SERPL-SCNC: 0.8 MMOL/L — SIGNIFICANT CHANGE UP (ref 0.7–2)
LYMPHOCYTES # BLD AUTO: 0.61 K/UL — LOW (ref 1–3.3)
LYMPHOCYTES # BLD AUTO: 0.81 K/UL — LOW (ref 1–3.3)
LYMPHOCYTES # BLD AUTO: 7.5 % — LOW (ref 13–44)
LYMPHOCYTES # BLD AUTO: 9.5 % — LOW (ref 13–44)
MCHC RBC-ENTMCNC: 29.3 PG — SIGNIFICANT CHANGE UP (ref 27–34)
MCHC RBC-ENTMCNC: 29.9 PG — SIGNIFICANT CHANGE UP (ref 27–34)
MCHC RBC-ENTMCNC: 32.5 G/DL — SIGNIFICANT CHANGE UP (ref 32–36)
MCHC RBC-ENTMCNC: 32.7 G/DL — SIGNIFICANT CHANGE UP (ref 32–36)
MCV RBC AUTO: 90.2 FL — SIGNIFICANT CHANGE UP (ref 80–100)
MCV RBC AUTO: 91.4 FL — SIGNIFICANT CHANGE UP (ref 80–100)
MONOCYTES # BLD AUTO: 0.67 K/UL — SIGNIFICANT CHANGE UP (ref 0–0.9)
MONOCYTES # BLD AUTO: 0.87 K/UL — SIGNIFICANT CHANGE UP (ref 0–0.9)
MONOCYTES NFR BLD AUTO: 10.2 % — SIGNIFICANT CHANGE UP (ref 2–14)
MONOCYTES NFR BLD AUTO: 8.2 % — SIGNIFICANT CHANGE UP (ref 2–14)
NEUTROPHILS # BLD AUTO: 6.72 K/UL — SIGNIFICANT CHANGE UP (ref 1.8–7.4)
NEUTROPHILS # BLD AUTO: 6.74 K/UL — SIGNIFICANT CHANGE UP (ref 1.8–7.4)
NEUTROPHILS NFR BLD AUTO: 78.9 % — HIGH (ref 43–77)
NEUTROPHILS NFR BLD AUTO: 82.7 % — HIGH (ref 43–77)
NRBC BLD AUTO-RTO: 0 /100 WBCS — SIGNIFICANT CHANGE UP (ref 0–0)
NRBC BLD AUTO-RTO: 0 /100 WBCS — SIGNIFICANT CHANGE UP (ref 0–0)
PLATELET # BLD AUTO: 253 K/UL — SIGNIFICANT CHANGE UP (ref 150–400)
PLATELET # BLD AUTO: 306 K/UL — SIGNIFICANT CHANGE UP (ref 150–400)
POTASSIUM SERPL-MCNC: 4.8 MMOL/L — SIGNIFICANT CHANGE UP (ref 3.5–5.3)
POTASSIUM SERPL-SCNC: 4.8 MMOL/L — SIGNIFICANT CHANGE UP (ref 3.5–5.3)
PROT SERPL-MCNC: 6 G/DL — SIGNIFICANT CHANGE UP (ref 6–8.3)
PROTHROM AB SERPL-ACNC: 14 SEC — HIGH (ref 9.9–13.4)
RBC # BLD: 3.01 M/UL — LOW (ref 4.2–5.8)
RBC # BLD: 3.38 M/UL — LOW (ref 4.2–5.8)
RBC # FLD: 14 % — SIGNIFICANT CHANGE UP (ref 10.3–14.5)
RBC # FLD: 14.1 % — SIGNIFICANT CHANGE UP (ref 10.3–14.5)
SODIUM SERPL-SCNC: 136 MMOL/L — SIGNIFICANT CHANGE UP (ref 135–145)
WBC # BLD: 8.13 K/UL — SIGNIFICANT CHANGE UP (ref 3.8–10.5)
WBC # BLD: 8.54 K/UL — SIGNIFICANT CHANGE UP (ref 3.8–10.5)
WBC # FLD AUTO: 8.13 K/UL — SIGNIFICANT CHANGE UP (ref 3.8–10.5)
WBC # FLD AUTO: 8.54 K/UL — SIGNIFICANT CHANGE UP (ref 3.8–10.5)

## 2025-07-12 PROCEDURE — 99223 1ST HOSP IP/OBS HIGH 75: CPT | Mod: GC

## 2025-07-12 PROCEDURE — 99285 EMERGENCY DEPT VISIT HI MDM: CPT

## 2025-07-12 PROCEDURE — 73552 X-RAY EXAM OF FEMUR 2/>: CPT | Mod: 26,LT

## 2025-07-12 PROCEDURE — 73522 X-RAY EXAM HIPS BI 3-4 VIEWS: CPT | Mod: 26

## 2025-07-12 RX ORDER — KETOROLAC TROMETHAMINE 30 MG/ML
15 INJECTION, SOLUTION INTRAMUSCULAR; INTRAVENOUS ONCE
Refills: 0 | Status: DISCONTINUED | OUTPATIENT
Start: 2025-07-12 | End: 2025-07-12

## 2025-07-12 RX ORDER — ACETAMINOPHEN 500 MG/5ML
1000 LIQUID (ML) ORAL ONCE
Refills: 0 | Status: COMPLETED | OUTPATIENT
Start: 2025-07-12 | End: 2025-07-12

## 2025-07-12 RX ORDER — LIDOCAINE HYDROCHLORIDE 20 MG/ML
1 JELLY TOPICAL ONCE
Refills: 0 | Status: COMPLETED | OUTPATIENT
Start: 2025-07-12 | End: 2025-07-12

## 2025-07-12 RX ADMIN — Medication 2 MILLIGRAM(S): at 14:39

## 2025-07-12 RX ADMIN — Medication 1000 MILLIGRAM(S): at 13:54

## 2025-07-12 RX ADMIN — Medication 400 MILLIGRAM(S): at 13:39

## 2025-07-12 RX ADMIN — Medication 1000 MILLIGRAM(S): at 20:27

## 2025-07-12 RX ADMIN — Medication 4 MILLIGRAM(S): at 21:16

## 2025-07-12 RX ADMIN — Medication 400 MILLIGRAM(S): at 19:27

## 2025-07-12 RX ADMIN — Medication 4 MILLIGRAM(S): at 16:51

## 2025-07-12 RX ADMIN — LIDOCAINE HYDROCHLORIDE 1 PATCH: 20 JELLY TOPICAL at 19:53

## 2025-07-12 RX ADMIN — LIDOCAINE HYDROCHLORIDE 1 PATCH: 20 JELLY TOPICAL at 13:39

## 2025-07-12 RX ADMIN — Medication 1000 MILLIGRAM(S): at 14:39

## 2025-07-12 RX ADMIN — KETOROLAC TROMETHAMINE 15 MILLIGRAM(S): 30 INJECTION, SOLUTION INTRAMUSCULAR; INTRAVENOUS at 14:39

## 2025-07-12 RX ADMIN — Medication 4 MILLIGRAM(S): at 20:46

## 2025-07-12 RX ADMIN — Medication 2 MILLIGRAM(S): at 13:38

## 2025-07-12 RX ADMIN — Medication 4 MILLIGRAM(S): at 15:51

## 2025-07-12 RX ADMIN — KETOROLAC TROMETHAMINE 15 MILLIGRAM(S): 30 INJECTION, SOLUTION INTRAMUSCULAR; INTRAVENOUS at 13:37

## 2025-07-12 NOTE — CONSULT NOTE ADULT - SUBJECTIVE AND OBJECTIVE BOX
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 Pain has difficulty walking and moving the left leg. Has recently had issues with bleeding and hematomas at home due to his lovenox injections. Feels fatigued on presentation. [place] [facility] [place] and is supposed to be seen tomorrow for a PET CT scan. In the ED patient with stable VS but h/h hemoglobin drop 9.9->9.0. Non con CT revealed RP psoas hematoma with extension towards the leg. Repeat CT with arterial phase shows small extravasation concerning for potential bleed. Vascular surgery consulted.       PAST MEDICAL HISTORY:  PAST MEDICAL & SURGICAL HISTORY:      ALLERGIES:  Allergies    No Known Allergies    Intolerances        SOCIAL HISTORY: Negative for tobacco, etoh, or drug use    FAMILY HISTORY:  FAMILY HISTORY:      PHYSICAL EXAM:  General: NAD, slight diaphoretic, fatigued  HEENT: NC/AT, EOMI, normal hearing, no oral lesions, no LAD, neck supple  Pulmonary: normal resp effort, CTA-B  Cardiovascular: NSR, no murmurs  Abdominal: soft, tender in left hemiabd, no organomegaly, no guarding or rebound tenderness  Extremities: WWP, limited mobility of the left leg, right left with no limits in mobility, no clubbing/cyanosis/edema  Neuro: A/O x 3, CNs II-XII grossly intact, normal sensation, no focal deficits    Vascular Pulse Exam:  Right Femoral:  Palpable       Left Femoral:  Palpable  Right DP:  Palpable                 Left DP:  Palpable      VITAL SIGNS:  Vital Signs Last 24 Hrs  T(C): 36.8 (12 Jul 2025 18:23), Max: 36.8 (12 Jul 2025 18:23)  T(F): 98.3 (12 Jul 2025 18:23), Max: 98.3 (12 Jul 2025 18:23)  HR: 66 (12 Jul 2025 18:23) (60 - 66)  BP: 150/76 (12 Jul 2025 18:23) (119/63 - 150/76)  BP(mean): --  RR: 16 (12 Jul 2025 18:23) (16 - 16)  SpO2: 96% (12 Jul 2025 18:23) (96% - 98%)    Parameters below as of 12 Jul 2025 18:23  Patient On (Oxygen Delivery Method): room air        I&O's Summary      LABS:                        9.2    x     )-----------( x        ( 12 Jul 2025 19:00 )             27.8     07-12    136  |  106  |  39[H]  ----------------------------<  126[H]  4.8   |  27  |  1.39[H]    Ca    9.0      12 Jul 2025 13:31    TPro  6.0  /  Alb  3.2[L]  /  TBili  0.4  /  DBili  x   /  AST  36  /  ALT  60  /  AlkPhos  55  07-12    PT/INR - ( 12 Jul 2025 13:44 )   PT: 14.0 sec;   INR: 1.20 ratio         PTT - ( 12 Jul 2025 13:44 )  PTT:36.2 sec  Urinalysis Basic - ( 12 Jul 2025 13:31 )    Color: x / Appearance: x / SG: x / pH: x  Gluc: 126 mg/dL / Ketone: x  / Bili: x / Urobili: x   Blood: x / Protein: x / Nitrite: x   Leuk Esterase: x / RBC: x / WBC x   Sq Epi: x / Non Sq Epi: x / Bacteria: x      CAPILLARY BLOOD GLUCOSE        LIVER FUNCTIONS - ( 12 Jul 2025 13:31 )  Alb: 3.2 g/dL / Pro: 6.0 g/dL / ALK PHOS: 55 U/L / ALT: 60 U/L DA / AST: 36 U/L / GGT: x             CULTURES:      RADIOLOGY & ADDITIONAL STUDIES:  < from: CT Abdomen and Pelvis w/wo IV Cont (07.12.25 @ 17:22) >    ACC: [id number] EXAM:  CT ABDOMEN AND PELVIS WAW IC   ORDERED BY: BRAD DIAZ     PROCEDURE DATE:  07/12/2025          INTERPRETATION:  CLINICAL INFORMATION: Acute drop in hemoglobin, assess   for active bleeding, left intramuscular/retroperitoneal hematoma on   recent CT    COMPARISON: CT pelvis 2 hours ago    CONTRAST/COMPLICATIONS:  IV Contrast: Omnipaque 350  90 cc administered   10 cc discarded  Oral Contrast: NONE.    PROCEDURE:  CT of the Abdomen and Pelvis was performed.  Precontrast, Arterial and Delayed phases were performed.  Sagittal and coronal reformats were performed.    FINDINGS:  LOWER CHEST: Within normal limits.    LIVER: Within normal limits.  BILE DUCTS: Normal caliber.  GALLBLADDER: Multiple small stones/polyps.  SPLEEN: Within normal limits.  PANCREAS: Within normal limits.  ADRENALS: Within normal limits.  KIDNEYS/URETERS: Atrophic left kidney with small cysts/hypodensities   small to characterize.    BLADDER: Within normal limits.  REPRODUCTIVE ORGANS: No pelvic mass.    BOWEL: No bowel obstruction. Appendix is not visualized.  PERITONEUM/RETROPERITONEUM: Moderate to large size left iliopsoas   intramuscular hematoma with small focus of active extravasation (11:91).   Small hemorrhagic ascites in the left abdomen.  VESSELS: Within normal limits.  LYMPH NODES: No lymphadenopathy.  ABDOMINAL WALL: Undescended right testicle in the right inguinal canal.  BONES: Within normal limits. Redemonstrated large left medial thigh   lipoma with septations extending into the left pelvic sidewall, follow-up   with nonemergent contrast MRI as per prior recommendation. Bilateral hip   orthopedic hardware.    IMPRESSION:  Moderate to large left iliopsoas intramuscular hematoma with small focus   of active bleeding. Small left hemoperitoneum.    Undescended right testicle in the right inguinal canal, outpatient   follow-up is recommended.    Findings were discussed with Dr. BRAD DIAZ [phone] 7/12/2025 6:22 PM   by Dr. Salomon with read back confirmation.    --- End of Report ---            WENDY SALOMON MD; Attending Radiologist  This document has been electronically signed. Jul 12 2025  6:    < end of copied text >          ASSESSMENT: 74 yom with anaplastic thyroid cancer invading right IJ and previous thrombosis presents with pain and swelling of left abd and hip. Currently on lovenox BID. CTAP reveals RP hematoma with smal active extrav.       Plan:  - reverse anticoagulation due to dropping h/h and active extrav w/ hematoma  - serial cbc q6   - medical admission for close monitoring of H/H  - vascular is available with for further concerns      Plan Discussed with Dr. Pabon    Vascular Surgery   [id number]/ [id number]

## 2025-07-12 NOTE — ED PROVIDER NOTE - CLINICAL SUMMARY MEDICAL DECISION MAKING FREE TEXT BOX
Patient is a 74-year-old male with past medical history HTN, thyroid cancer on oral chemotherapy, left hip surgery approximately 40 years ago presenting with atraumatic left hip pain for 1 week with difficulty ambulating today.  As per patient and sister at bedside, patient has had atraumatic left lateral hip pain for approximately 1 week.  Since yesterday patient has had difficulty with range of motion and needed help getting out of bed today.  VSS Left hip tender to palpation laterally, pain limited range of motion, passive range of motion intact, no obvious deformities,  2+ pulses capillary refill less than 2 seconds sensation intact.  No spinal tenderness to palpation.  -   Will treat pain, check labs rule out electrolyte abnormalities, x-ray and CT to eval for fracture, reassess. 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 As per patient and sister at bedside, patient has had atraumatic left lateral hip pain for approximately 1 week.  Since yesterday patient has had difficulty with range of motion and needed help getting out of bed today.  VSS Left hip tender to palpation laterally, pain limited range of motion, passive range of motion intact, no obvious deformities,  2+ pulses capillary refill less than 2 seconds sensation intact.  No spinal tenderness to palpation.  -   Will treat pain, check labs rule out electrolyte abnormalities, x-ray and CT to eval for fracture, reassess.

## 2025-07-12 NOTE — ED PROVIDER NOTE - OBJECTIVE STATEMENT
Problem: At Risk for Falls  Goal: Patient does not fall  Outcome: Met, complete goal  Goal: Patient takes action to control fall-related risks  Outcome: Met, complete goal     Problem: At Risk for Injury Due to Fall  Goal: Patient does not fall  Outcome: Met, complete goal  Goal: Takes action to control condition specific risks  Outcome: Met, complete goal  Goal: Verbalizes understanding of fall-related injury personal risks  Description: Document education using the patient education activity  Outcome: Met, complete goal     Problem: Pain  Goal: Acceptable pain level achieved/maintained at rest using appropriate pain scale for the patient  Outcome: Met, complete goal  Goal: Acceptable pain level achieved/maintained with activity using appropriate pain scale for the patient  Outcome: Met, complete goal  Goal: Acceptable pain level achieved/maintained without oversedation  Outcome: Met, complete goal     Problem: Stroke: Ischemic (Transient/Permanent)  Goal: Neurological status is maintained/restored to status at baseline  Description: Monitor neurological and mental status including symptoms of increasing intracranial pressure (headache, nausea/vomiting, or change in behavior). Hypertension (greater than 180 systolic) may also indicate a change in status related to stroke.  Outcome: Met, complete goal  Goal: Normal temperature is maintained  Outcome: Met, complete goal  Goal: Elimination status is maintained/returned to baseline  Description: Remove indwelling urinary catheter as soon as possible or collaborate with provider for order/reason for continued use.   Outcome: Met, complete goal  Goal: #Depressive s/s (self-reported/observed) are recognized and monitored  Outcome: Met, complete goal  Goal: Personal stroke risk factors are identified with initial plan for risk reduction  Description: Stroke risk reduction involves taking medication, changing diet, increasing physical exercise, smoking cessation, or  alcohol/drug use reduction/cessation based on identified risks.  Outcome: Met, complete goal  Goal: Verbalizes understanding of condition and treatment plan  Description: Document on Patient Education Activity  Outcome: Met, complete goal     Problem: Diabetes  Goal: Achieves glycemic balance  Description: Goal is to maintain blood sugar within range with no episodes of hypoglycemia  Outcome: Met, complete goal  Goal: Verbalizes/demonstrates understanding of NEW diagnosis of diabetes and management  Description: Document on Patient Education Activity  Outcome: Met, complete goal  Goal: Verbalizes understanding of diabetes management including how to use HbA1C to evaluate status of blood sugar over time (Diabetes is NOT a new diagnosis)  Description: Diabetes Education  Outcome: Met, complete goal  Goal: Demonstrates ability to self-administer insulin  Description: Document on Patient Education Activity  Outcome: Met, complete goal      Patient is a 74-year-old male with past medical history HTN, thyroid cancer on oral chemotherapy, left hip surgery approximately 40 years ago presenting with atraumatic left hip pain for 1 week with difficulty ambulating today.  As per patient and sister at bedside, patient has had atraumatic left lateral hip pain for approximately 1 week.  Since yesterday patient has had difficulty with range of motion and needed help getting out of bed today.  Patient was not able to ambulate with his cane due to pain today. Denies blood thinners, falls, fevers, loss of urine or stool, difficulty urinating or stooling, paresthesias, IVDU. 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  As per patient and sister at bedside, patient has had atraumatic left lateral hip pain for approximately 1 week.  Since yesterday patient has had difficulty with range of motion and needed help getting out of bed today.  Patient was not able to ambulate with his cane due to pain today. Denies blood thinners, falls, fevers, loss of urine or stool, difficulty urinating or stooling, paresthesias, IVDU.

## 2025-07-12 NOTE — H&P ADULT - PROBLEM SELECTOR PLAN 4
Hx of HTN on Cozar and Amlodipine    - C/w home medications with parameters Hx of HTN on Cozaar and Amlodipine    - C/w home medications with parameters

## 2025-07-12 NOTE — ED PROVIDER NOTE - PROGRESS NOTE DETAILS
Found to have multiple pelvic and thigh hematomas on dry CT.  Will repeat labs and hemoglobin, obtain CT angio, continue to monitor. Angella Dean MD. CT results are reviewed.  Vascular surgery is called–will come see the patient however this patient will be better served by IR consult. Transfer center is called to speak to IR around 645 - awiting a call back with IR attg. Pt is HDS at this time. However H/H drop is concerning. 07/01 H/H 12.8; today 9.9 that dropped to 9. Pt denies any pain at this time. Last dose of 60mg of lovenox was at 730a today which was about 11 hours ago.Pt is at risk of thrombosis due to CA. Plan to repeat hemoglobin around 7pm to discuss the risk and benefits of reversing lovenox. Hold off on lovenox for now. Transfer center is called twice; being at 820–still no answer from IR.  Patient is complaining of pain - morphine is ordered. AC reversal is offered. Pt is deciding on AC reversal with his family. Spoke with IR team–no acute interventions at this time plan to trend H&H every 6 hours.  Hold off on AC reversal at this time unless patient's condition deteriorates given stable H&H and stable vitals.  Spoke at length with patient, his sister and his nephew over the phone who is a physician.  All questions are answered.  Patient initially wanted to be transferred to [facility] for admission, now stating will hold off and stay here for continued care.

## 2025-07-12 NOTE — CONSULT NOTE ADULT - SUBJECTIVE AND OBJECTIVE BOX
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 Pain has difficulty walking and moving the left leg. Has recently had issues with bleeding and hematomas at home due to his lovenox injections. Feels fatigued on presentation. Reports he is [place] [facility] [place] and is supposed to be seen tomorrow for a PET CT scan. In the ED patient with stable VS but h/h hemoglobin drop 9.9->9.0->9.2. Non con CT revealed RP psoas hematoma with extension towards the leg. Repeat CT with arterial phase shows small extravasation.    Allergies: No Known Allergies    Medications (Abx/Cardiac/Anticoagulation/Blood Products)  Lovenox 60mg SQ q12h last dose 0700 7/12/2025    Data:  154.9  58.1  T(C): 37.1  HR: 80  BP: 126/79  RR: 15  SpO2: 96%    -WBC -- / HgB 9.2 / Hct 27.8 / Plt --  -Na 136 / Cl 106 / BUN 39 / Glucose 126  -K 4.8 / CO2 27 / Cr 1.39  -ALT 60 / Alk Phos 55 / T.Bili 0.4  -INR 1.20 / PTT 36.2      Imaging:   < from: CT Abdomen and Pelvis w/wo IV Cont (07.12.25 @ 17:22) >  ACC: [id number] EXAM:  CT ABDOMEN AND PELVIS WAW IC   ORDERED BY: BRAD DIAZ     PROCEDURE DATE:  07/12/2025          INTERPRETATION:  CLINICAL INFORMATION: Acute drop in hemoglobin, assess   for active bleeding, left intramuscular/retroperitoneal hematoma on   recent CT    COMPARISON: CT pelvis 2 hours ago    CONTRAST/COMPLICATIONS:  IV Contrast: Omnipaque 350  90 cc administered   10 cc discarded  Oral Contrast: NONE.    PROCEDURE:  CT of the Abdomen and Pelvis was performed.  Precontrast, Arterial and Delayed phases were performed.  Sagittal and coronal reformats were performed.    FINDINGS:  LOWER CHEST: Within normal limits.    LIVER: Within normal limits.  BILE DUCTS: Normal caliber.  GALLBLADDER: Multiple small stones/polyps.  SPLEEN: Within normal limits.  PANCREAS: Within normal limits.  ADRENALS: Within normal limits.  KIDNEYS/URETERS: Atrophic left kidney with small cysts/hypodensities   small to characterize.    BLADDER: Within normal limits.  REPRODUCTIVE ORGANS: No pelvic mass.    BOWEL: No bowel obstruction. Appendix is not visualized.  PERITONEUM/RETROPERITONEUM: Moderate to large size left iliopsoas   intramuscular hematoma with small focus of active extravasation (11:91).   Small hemorrhagic ascites in the left abdomen.  VESSELS: Within normal limits.  LYMPH NODES: No lymphadenopathy.  ABDOMINAL WALL: Undescended right testicle in the right inguinal canal.  BONES: Within normal limits. Redemonstrated large left medial thigh   lipoma with septations extending into the left pelvic sidewall, follow-up   with nonemergent contrast MRI as per prior recommendation. Bilateral hip   orthopedic hardware.    IMPRESSION:  Moderate to large left iliopsoas intramuscular hematoma with small focus   of active bleeding. Small left hemoperitoneum.    Undescended right testicle in the right inguinal canal, outpatient   follow-up is recommended.    Findings were discussed with Dr. BRAD DIAZ [phone] 7/12/2025 6:22 PM   by Dr. Salomon with read back confirmation.    --- End of Report ---            WENDY SALOMON MD; Attending Radiologist  This document has been electronically signed. Jul 12 2025  6:23PM    < end of copied text >

## 2025-07-12 NOTE — H&P ADULT - ATTENDING COMMENTS
IMAGING  CT A/P with IV Contrast  IMPRESSION:  Moderate to large left iliopsoas intramuscular hematoma with small focus of active bleeding. Small left hemoperitoneum.  Undescended right testicle in the right inguinal canal, outpatient follow-up is recommended.    CT Left Femur  CT Pelvis  IMPRESSION:  1. Multiple mixed-density soft tissue masses are present as follows:  * 19 cm in left illiacus/iliopsoas muscle and 5.2 cm in left psoas muscle  * 5.1 cm in the right gluteus kiesha muscle  * 4.9 cm in right inguinal region.  Moderate collection of presacral- space hemorrhagic blood products. Cannot exclude active extravasation and CTA may be required. Advise correlation with risk factors for multiple hematomas; findings could less likely represent multiple hemorrhagic soft tissue metastases.  2. Very large (18 cm) left pelvic, inguinal, and proximal medial thigh lipoma with thin internal septations. No definite CT-suspicious features. On a nonemergent basis the finding could be followed up with contrast enhanced pelvis/thigh MRI.  3. No acute fracture. Chronic findings as above including prostate enlargement and thick-walled appearance of the bladder.    Left Femur X-Ray  Pelvic X-Ray  Pending official read; on my read no fracture.    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  The patient states the pain is mild when he doesn't move his leg, but becomes severe when he tries to move his leg or ambulate. He has been unable to get out of bed or ambulate due to the pain. He has weakness in his legs with right leg weaker than left but he states his inability to ambulate is due to pain, not weakness. He ambulates with a cane at baseline. In the ER CT with left iliopsoas intramuscular hematoma with small focus of active bleeding and small left hemoperitoneum. Patient denies any recent falls or injuries to his hip or leg. He denies this ever happening to him in the past. He states his hip surgeries were from years ago and are not recent. His last Lovenox dose was at 7:30am. ER discussed case with IR and Vascular who recommended to monitor Hgb/Hct and recommended against protamine sulfate as last Lovenox dose was greater than 12 hours ago.    Review Of Systems included: + left hip pain, + leg weakness (right worse than left),   No falls, no injuries to hip or legs, no numbness currently (has occasional numbness with walking that self-resolves), no bloody stool, no melena, no chest pain, no shortness of breath, no diarrhea, no constipation, no dysuria, no lightheadedness    Physical Exam  General: Awake, Alert, Oriented  Cardiac: RRR  Pulmonary: CTA b/l  Abdominal: Soft, ND, NT  Extremities: No edema b/l, No bruising on thighs noted. + left hip with swelling    A/P  # Moderate to large left iliopsoas intramuscular hematoma  # Small left hemoperitoneum  # Severe Intractable Left Hip Pain  # Ambulatory Dysfunction  > ER discussed with IR and Vascular; protamine sulfate not indicated as last dose of Lovenox was greater than 12 hours ago  > Inability to ambulate due to left hip pain  - IR consulted  - Vascular consulted  - Monitor Hgb Q8H; and, transfuse for Hgb < 7  - SCDs due to hematoma; resume home Lovenox BID when appropriate  - Tylenol prn for mild pain, IV Morphine 1mg prn for moderate pain, IV Morphine 2mg prn for severe pain  - PT Evaluation when appropriate    # Undescended right testicle  > Patient states he was already previously aware that he has an undescended right testicle  - Outpatient follow up    # Hx of HTN, HLD, Hyperkalemia, Elevated PTH, Lumbar Radiculopathy, Osteopenia, Microalbuminuria, 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 TREATMENT: Started on Dabrafenib and Trametinib on 6/29/2025 - Tolerating full-dose therapy well. DISCUSSION: Tumor is aggressive with high risk of airway compromise.  He seems to have some response. Continue fill dose BRAF/MEKi  9/15/2020: Dermatology Clinic Visit: Actinic keratosis, Cherry angioma, Seborrheic keratoses, Lipoma    Patient case and management was discussed with ER Attending  I did examine all labs (including CBC, PT/INR, APTT, CMP, Lactate), imaging, prior notes

## 2025-07-12 NOTE — H&P ADULT - PROBLEM SELECTOR PLAN 1
P/w left sided hip pain  Hx of IJ thrombosis on Lovenox  On CT with  large left iliopsoas intramuscular hematoma with small focus of active bleeding, small left hemoperitoneum  IR and vascular consulted, no Lovenox reversal, no surgical intervention q6h    - F/u repeat CBC  - Hold Lovenox for now  - F/u IR and vascular surgery recommendations

## 2025-07-12 NOTE — H&P ADULT - NSHPPHYSICALEXAM_GEN_ALL_CORE
Vital Signs Last 24 Hrs  T(C): 36.8 (13 Jul 2025 02:18), Max: 37.1 (12 Jul 2025 20:46)  T(F): 98.2 (13 Jul 2025 02:18), Max: 98.7 (12 Jul 2025 20:46)  HR: 73 (13 Jul 2025 02:18) (60 - 80)  BP: 136/81 (13 Jul 2025 02:18) (119/63 - 150/76)  BP(mean): --  RR: 16 (13 Jul 2025 02:18) (15 - 16)  SpO2: 98% (13 Jul 2025 02:18) (96% - 98%)    Parameters below as of 13 Jul 2025 02:18  Patient On (Oxygen Delivery Method): room air        GENERAL: NAD, lying in bed comfortably  HEAD:  Atraumatic, Normocephalic  EYES: EOMI, PERRLA, conjunctiva and sclera clear  ENT: Moist mucous membranes  NECK: Supple, No JVD  CHEST/LUNG: Clear to auscultation bilaterally; No rales, rhonchi, wheezing, or rubs. Unlabored respirations  HEART: Regular rate and rhythm; No murmurs, rubs, or gallops  ABDOMEN: Bruising right lower abdomen at injection site. Bowel sounds present; Soft, Nontender, Nondistended. No hepatomegaly  EXTREMITIES:  2+ Peripheral Pulses, brisk capillary refill. No clubbing, cyanosis, or edema  NERVOUS SYSTEM:  Alert & Oriented X3, speech clear. No deficits   MSK: ROM reduced due to pain in LLE. FROM in other 3 extremities, full and equal strength  SKIN: No rashes or lesions

## 2025-07-12 NOTE — H&P ADULT - NSHPREVIEWOFSYSTEMS_GEN_ALL_CORE
REVIEW OF SYSTEMS:    CONSTITUTIONAL: No weakness, fevers or chills  EYES/ENT: No visual changes;  No vertigo or throat pain   NECK: No pain or stiffness  RESPIRATORY: No cough, wheezing, hemoptysis; No shortness of breath  CARDIOVASCULAR: No chest pain or palpitations  GASTROINTESTINAL: No abdominal or epigastric pain. No nausea, vomiting, or hematemesis; No diarrhea or constipation. No melena or hematochezia.  GENITOURINARY: No dysuria, frequency or hematuria  NEUROLOGICAL: Left sided hip pain  SKIN: No itching, burning, rashes, or lesions   All other review of systems is negative unless indicated above.

## 2025-07-12 NOTE — H&P ADULT - HISTORY OF PRESENT ILLNESS
Patient is a YO, from home, ambulates independently at baseline, with PMHx significant for, PSHx significant for who presents to the ED with    Patient denies headaches, fevers, chills, shortness of breath, chest pain, palpitations, nausea, vomiting, changes in urination or bowel movements.    ED Course  Vitals: BP mmHg, HR BPM, RR , SpO2 % on RA, T   Labs:  CXR:  EKG:   S/p   Patient is a 73 YO, from home, ambulates independently at baseline, with 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 Patient reports that pain started approximately one week ago and has progressively worsened. He is now unable to ambulate due to the pain. Patient denies headaches, fevers, chills, shortness of breath, chest pain, palpitations, nausea, vomiting, changes in urination or bowel movements.    ED Course  Vitals: /61 mmHg, HR 62 BPM, RR 16, SpO2 97% on RA, T 36.7   Labs: Hb 9.9, SCr 1.39  CT with moderate to large left iliopsoas intramuscular hematoma with small focus   of active bleeding. Small left hemoperitoneum.  S/p morphine, toradol

## 2025-07-12 NOTE — ED PROVIDER NOTE - PHYSICAL EXAMINATION
Gen: no acute distress  Head: normocephalic  Lung: CTAB, no respiratory distress, no wheezing, rales, rhonchi  CV: normal s1/s2, rrr,   Abd: soft, non-tender, non-distended  MSK:   Left hip tender to palpation laterally, pain limited range of motion, passive range of motion intact, 2+ pulses capillary refill less than 2 seconds sensation intact.  No spinal tenderness to palpation.  Neuro: No focal neurologic deficits

## 2025-07-12 NOTE — H&P ADULT - PROBLEM SELECTOR PLAN 3
Hx of thyroid cancer on Mekinist 2mg and Tafinlar BID  Treatment at [facility]    - C/w home medication  - Consider Heme Onc consult for thyroid cancer Hx of thyroid cancer on Mekinist 2mg qd and Tafinlar 150 BID  Treatment at [facility]    - PATIENT TO PROVIDE MEDICATIONS, NON FORMULARY AT [facility]  - Consider Heme Onc consult for thyroid cancer

## 2025-07-12 NOTE — H&P ADULT - ASSESSMENT
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 In the ED, patient found to have moderate to large left iliopsoas intramuscular hematoma with small focus of active bleeding. Small left hemoperitoneum. No acute intervention as per vascular surgery and IR. Patient is being admitted for management of intramuscular psoas bleed.

## 2025-07-12 NOTE — H&P ADULT - PROBLEM SELECTOR PLAN 2
Hx of IJ thrombosis, treated with Lovenox  Unclear why not on DOAC    - Hold Lovenox iso psoas bleed  - Consider Heme Onc consult for AC and thyroid cancer

## 2025-07-12 NOTE — ED ADULT NURSE NOTE - NSFALLRISKINTERV_ED_ALL_ED

## 2025-07-12 NOTE — ED PROVIDER NOTE - HIV OFFER
Opt out Require Ndc Code?: No Kenalog Type Of Vial: Multiple Dose How Many Mls Were Removed From The 10 Mg/Ml (5ml) Vial When Preparing The Injectable Solution?: 0 Detail Level: Detailed Consent: The risks of local atrophy, telangiectasis, systemic absorption were reviewed with the patient. Total Volume (Ccs): 0.05 Administered By (Optional): MCKENZIE Which Kenalog Vial Was Used?: Kenalog 10 mg/ml (5 ml vial) Medical Necessity Clause: This procedure was medically necessary because the lesions that were treated were: Validate Note Data When Using Inventory: Yes Kenalog Preparation: Kenalog Concentration Of Kenalog Solution Injected (Mg/Ml): 2.5

## 2025-07-12 NOTE — CONSULT NOTE ADULT - ASSESSMENT
Assessment: 74y Male with nonoperable thyroid cancer with Rt IJ invasion and thrombosis on therapeutic lovenox 60mg SQ q12h last dose 0700 presenting with left hip and back pain found to have left RP hematoma with small, punctate extravasation. Hgb/Hct stable and hemodynamically stable    Plan:   - Will defer angiography and embolization at this time given pt's stability.  - Should pt's condition deteriorate, will re-evaluate  - Lovenox last dose approx 14hrs ago and likely 12% effective. Should be almost completely ineffective by 24hrs. Given clinical picture, no need for active reversal from IR perspective. Should pt's clinical condition deteriorate, could consider active reversal.  - Trend H/H q6hr  - Close monitoring  - Should the clinical team at [facility] desire for the patient to be in a facility where IR services are available 24/7, then transfer to [facility] or [facility] would be at their discretion. Once patient reaches the receiving hospital, IR can be re-consulted.  - discussed with Dr. Jay of [facility] ED at 2127 7/12/2025    Francis Bundy MD  Interventional Radiology    -Available on [employer] TEAMS for all non-urgent questions  -Emergent issues: [facility]-p.[phone]; [facility]-p.[phone] ([phone])  -Non-emergent consults: Please place a [place] order "IR Consult" with an appropriate callback number  -Scheduling questions: [facility]: [phone]; [facility]: [phone]  -Clinic/Outpatient booking: [facility]: [phone]; [facility]: [phone]

## 2025-07-13 DIAGNOSIS — I10 ESSENTIAL (PRIMARY) HYPERTENSION: ICD-10-CM

## 2025-07-13 DIAGNOSIS — C73 MALIGNANT NEOPLASM OF THYROID GLAND: ICD-10-CM

## 2025-07-13 DIAGNOSIS — Z29.9 ENCOUNTER FOR PROPHYLACTIC MEASURES, UNSPECIFIED: ICD-10-CM

## 2025-07-13 DIAGNOSIS — R58 HEMORRHAGE, NOT ELSEWHERE CLASSIFIED: ICD-10-CM

## 2025-07-13 LAB
ALBUMIN SERPL ELPH-MCNC: 2.8 G/DL — LOW (ref 3.5–5)
ALP SERPL-CCNC: 62 U/L — SIGNIFICANT CHANGE UP (ref 40–120)
ALT FLD-CCNC: 107 U/L DA — HIGH (ref 10–60)
ANION GAP SERPL CALC-SCNC: 2 MMOL/L — LOW (ref 5–17)
ANION GAP SERPL CALC-SCNC: 5 MMOL/L — SIGNIFICANT CHANGE UP (ref 5–17)
APPEARANCE UR: ABNORMAL
APTT BLD: 28.7 SEC — SIGNIFICANT CHANGE UP (ref 26.1–36.8)
AST SERPL-CCNC: 101 U/L — HIGH (ref 10–40)
BACTERIA # UR AUTO: ABNORMAL /HPF
BASOPHILS # BLD AUTO: 0.02 K/UL — SIGNIFICANT CHANGE UP (ref 0–0.2)
BASOPHILS NFR BLD AUTO: 0.3 % — SIGNIFICANT CHANGE UP (ref 0–2)
BILIRUB SERPL-MCNC: 0.5 MG/DL — SIGNIFICANT CHANGE UP (ref 0.2–1.2)
BILIRUB UR-MCNC: NEGATIVE — SIGNIFICANT CHANGE UP
BUN SERPL-MCNC: 38 MG/DL — HIGH (ref 7–18)
BUN SERPL-MCNC: 39 MG/DL — HIGH (ref 7–18)
CALCIUM SERPL-MCNC: 8.3 MG/DL — LOW (ref 8.4–10.5)
CALCIUM SERPL-MCNC: 8.5 MG/DL — SIGNIFICANT CHANGE UP (ref 8.4–10.5)
CHLORIDE SERPL-SCNC: 105 MMOL/L — SIGNIFICANT CHANGE UP (ref 96–108)
CHLORIDE SERPL-SCNC: 106 MMOL/L — SIGNIFICANT CHANGE UP (ref 96–108)
CO2 SERPL-SCNC: 25 MMOL/L — SIGNIFICANT CHANGE UP (ref 22–31)
CO2 SERPL-SCNC: 28 MMOL/L — SIGNIFICANT CHANGE UP (ref 22–31)
COLOR SPEC: SIGNIFICANT CHANGE UP
CREAT SERPL-MCNC: 1.23 MG/DL — SIGNIFICANT CHANGE UP (ref 0.5–1.3)
CREAT SERPL-MCNC: 1.28 MG/DL — SIGNIFICANT CHANGE UP (ref 0.5–1.3)
DIFF PNL FLD: ABNORMAL
EGFR: 59 ML/MIN/1.73M2 — LOW
EGFR: 59 ML/MIN/1.73M2 — LOW
EGFR: 62 ML/MIN/1.73M2 — SIGNIFICANT CHANGE UP
EGFR: 62 ML/MIN/1.73M2 — SIGNIFICANT CHANGE UP
EOSINOPHIL # BLD AUTO: 0 K/UL — SIGNIFICANT CHANGE UP (ref 0–0.5)
EOSINOPHIL NFR BLD AUTO: 0 % — SIGNIFICANT CHANGE UP (ref 0–6)
EPI CELLS # UR: PRESENT
FLUAV AG NPH QL: SIGNIFICANT CHANGE UP
FLUBV AG NPH QL: SIGNIFICANT CHANGE UP
GLUCOSE SERPL-MCNC: 130 MG/DL — HIGH (ref 70–99)
GLUCOSE SERPL-MCNC: 142 MG/DL — HIGH (ref 70–99)
GLUCOSE UR QL: NEGATIVE MG/DL — SIGNIFICANT CHANGE UP
HCT VFR BLD CALC: 25.8 % — LOW (ref 39–50)
HCT VFR BLD CALC: 25.8 % — LOW (ref 39–50)
HCT VFR BLD CALC: 26.4 % — LOW (ref 39–50)
HCT VFR BLD CALC: 27.6 % — LOW (ref 39–50)
HGB BLD-MCNC: 8.4 G/DL — LOW (ref 13–17)
HGB BLD-MCNC: 8.6 G/DL — LOW (ref 13–17)
HGB BLD-MCNC: 8.7 G/DL — LOW (ref 13–17)
HGB BLD-MCNC: 9.1 G/DL — LOW (ref 13–17)
IMM GRANULOCYTES NFR BLD AUTO: 1 % — HIGH (ref 0–0.9)
INR BLD: 1.26 RATIO — HIGH (ref 0.85–1.16)
KETONES UR QL: NEGATIVE MG/DL — SIGNIFICANT CHANGE UP
LACTATE SERPL-SCNC: 1.3 MMOL/L — SIGNIFICANT CHANGE UP (ref 0.7–2)
LEUKOCYTE ESTERASE UR-ACNC: NEGATIVE — SIGNIFICANT CHANGE UP
LYMPHOCYTES # BLD AUTO: 0.24 K/UL — LOW (ref 1–3.3)
LYMPHOCYTES # BLD AUTO: 3.5 % — LOW (ref 13–44)
MAGNESIUM SERPL-MCNC: 2.2 MG/DL — SIGNIFICANT CHANGE UP (ref 1.6–2.6)
MCHC RBC-ENTMCNC: 29.4 PG — SIGNIFICANT CHANGE UP (ref 27–34)
MCHC RBC-ENTMCNC: 30 PG — SIGNIFICANT CHANGE UP (ref 27–34)
MCHC RBC-ENTMCNC: 30.2 PG — SIGNIFICANT CHANGE UP (ref 27–34)
MCHC RBC-ENTMCNC: 30.2 PG — SIGNIFICANT CHANGE UP (ref 27–34)
MCHC RBC-ENTMCNC: 32.6 G/DL — SIGNIFICANT CHANGE UP (ref 32–36)
MCHC RBC-ENTMCNC: 33 G/DL — SIGNIFICANT CHANGE UP (ref 32–36)
MCHC RBC-ENTMCNC: 33 G/DL — SIGNIFICANT CHANGE UP (ref 32–36)
MCHC RBC-ENTMCNC: 33.3 G/DL — SIGNIFICANT CHANGE UP (ref 32–36)
MCV RBC AUTO: 90.2 FL — SIGNIFICANT CHANGE UP (ref 80–100)
MCV RBC AUTO: 90.5 FL — SIGNIFICANT CHANGE UP (ref 80–100)
MCV RBC AUTO: 91 FL — SIGNIFICANT CHANGE UP (ref 80–100)
MCV RBC AUTO: 91.7 FL — SIGNIFICANT CHANGE UP (ref 80–100)
MONOCYTES # BLD AUTO: 0.66 K/UL — SIGNIFICANT CHANGE UP (ref 0–0.9)
MONOCYTES NFR BLD AUTO: 9.7 % — SIGNIFICANT CHANGE UP (ref 2–14)
NEUTROPHILS # BLD AUTO: 5.81 K/UL — SIGNIFICANT CHANGE UP (ref 1.8–7.4)
NEUTROPHILS NFR BLD AUTO: 85.5 % — HIGH (ref 43–77)
NITRITE UR-MCNC: NEGATIVE — SIGNIFICANT CHANGE UP
NRBC BLD AUTO-RTO: 0 /100 WBCS — SIGNIFICANT CHANGE UP (ref 0–0)
PH UR: 5 — SIGNIFICANT CHANGE UP (ref 5–8)
PHOSPHATE SERPL-MCNC: 4.2 MG/DL — SIGNIFICANT CHANGE UP (ref 2.5–4.5)
PLATELET # BLD AUTO: 216 K/UL — SIGNIFICANT CHANGE UP (ref 150–400)
PLATELET # BLD AUTO: 217 K/UL — SIGNIFICANT CHANGE UP (ref 150–400)
PLATELET # BLD AUTO: 227 K/UL — SIGNIFICANT CHANGE UP (ref 150–400)
PLATELET # BLD AUTO: 233 K/UL — SIGNIFICANT CHANGE UP (ref 150–400)
POTASSIUM SERPL-MCNC: 4.3 MMOL/L — SIGNIFICANT CHANGE UP (ref 3.5–5.3)
POTASSIUM SERPL-MCNC: 5.1 MMOL/L — SIGNIFICANT CHANGE UP (ref 3.5–5.3)
POTASSIUM SERPL-SCNC: 4.3 MMOL/L — SIGNIFICANT CHANGE UP (ref 3.5–5.3)
POTASSIUM SERPL-SCNC: 5.1 MMOL/L — SIGNIFICANT CHANGE UP (ref 3.5–5.3)
PROT SERPL-MCNC: 5.6 G/DL — LOW (ref 6–8.3)
PROT UR-MCNC: 30 MG/DL
PROTHROM AB SERPL-ACNC: 14.6 SEC — HIGH (ref 9.9–13.4)
RBC # BLD: 2.85 M/UL — LOW (ref 4.2–5.8)
RBC # BLD: 2.86 M/UL — LOW (ref 4.2–5.8)
RBC # BLD: 2.9 M/UL — LOW (ref 4.2–5.8)
RBC # BLD: 3.01 M/UL — LOW (ref 4.2–5.8)
RBC # FLD: 14 % — SIGNIFICANT CHANGE UP (ref 10.3–14.5)
RBC # FLD: 14 % — SIGNIFICANT CHANGE UP (ref 10.3–14.5)
RBC # FLD: 14.1 % — SIGNIFICANT CHANGE UP (ref 10.3–14.5)
RBC # FLD: 14.1 % — SIGNIFICANT CHANGE UP (ref 10.3–14.5)
RBC CASTS # UR COMP ASSIST: 2 /HPF — SIGNIFICANT CHANGE UP (ref 0–4)
RSV RNA NPH QL NAA+NON-PROBE: SIGNIFICANT CHANGE UP
SARS-COV-2 RNA SPEC QL NAA+PROBE: SIGNIFICANT CHANGE UP
SODIUM SERPL-SCNC: 135 MMOL/L — SIGNIFICANT CHANGE UP (ref 135–145)
SODIUM SERPL-SCNC: 136 MMOL/L — SIGNIFICANT CHANGE UP (ref 135–145)
SOURCE RESPIRATORY: SIGNIFICANT CHANGE UP
SP GR SPEC: 1.04 — HIGH (ref 1–1.03)
URATE CRY FLD QL MICRO: PRESENT
UROBILINOGEN FLD QL: 1 MG/DL — SIGNIFICANT CHANGE UP (ref 0.2–1)
WBC # BLD: 6.8 K/UL — SIGNIFICANT CHANGE UP (ref 3.8–10.5)
WBC # BLD: 7.1 K/UL — SIGNIFICANT CHANGE UP (ref 3.8–10.5)
WBC # BLD: 7.8 K/UL — SIGNIFICANT CHANGE UP (ref 3.8–10.5)
WBC # BLD: 8.16 K/UL — SIGNIFICANT CHANGE UP (ref 3.8–10.5)
WBC # FLD AUTO: 6.8 K/UL — SIGNIFICANT CHANGE UP (ref 3.8–10.5)
WBC # FLD AUTO: 7.1 K/UL — SIGNIFICANT CHANGE UP (ref 3.8–10.5)
WBC # FLD AUTO: 7.8 K/UL — SIGNIFICANT CHANGE UP (ref 3.8–10.5)
WBC # FLD AUTO: 8.16 K/UL — SIGNIFICANT CHANGE UP (ref 3.8–10.5)
WBC UR QL: 2 /HPF — SIGNIFICANT CHANGE UP (ref 0–5)

## 2025-07-13 PROCEDURE — 99233 SBSQ HOSP IP/OBS HIGH 50: CPT

## 2025-07-13 PROCEDURE — 71045 X-RAY EXAM CHEST 1 VIEW: CPT | Mod: 26

## 2025-07-13 RX ORDER — MELATONIN 5 MG
3 TABLET ORAL AT BEDTIME
Refills: 0 | Status: DISCONTINUED | OUTPATIENT
Start: 2025-07-13 | End: 2025-07-17

## 2025-07-13 RX ORDER — LOSARTAN POTASSIUM 100 MG/1
50 TABLET, FILM COATED ORAL DAILY
Refills: 0 | Status: DISCONTINUED | OUTPATIENT
Start: 2025-07-13 | End: 2025-07-17

## 2025-07-13 RX ORDER — LOSARTAN POTASSIUM 100 MG/1
100 TABLET, FILM COATED ORAL DAILY
Refills: 0 | Status: DISCONTINUED | OUTPATIENT
Start: 2025-07-13 | End: 2025-07-13

## 2025-07-13 RX ORDER — MAGNESIUM, ALUMINUM HYDROXIDE 200-200 MG
30 TABLET,CHEWABLE ORAL EVERY 4 HOURS
Refills: 0 | Status: DISCONTINUED | OUTPATIENT
Start: 2025-07-13 | End: 2025-07-17

## 2025-07-13 RX ORDER — ACETAMINOPHEN 500 MG/5ML
325 LIQUID (ML) ORAL ONCE
Refills: 0 | Status: COMPLETED | OUTPATIENT
Start: 2025-07-13 | End: 2025-07-13

## 2025-07-13 RX ORDER — PIPERACILLIN-TAZO-DEXTROSE,ISO 3.375G/5
3.38 IV SOLUTION, PIGGYBACK PREMIX FROZEN(ML) INTRAVENOUS EVERY 8 HOURS
Refills: 0 | Status: DISCONTINUED | OUTPATIENT
Start: 2025-07-13 | End: 2025-07-15

## 2025-07-13 RX ORDER — ACETAMINOPHEN 500 MG/5ML
650 LIQUID (ML) ORAL EVERY 6 HOURS
Refills: 0 | Status: DISCONTINUED | OUTPATIENT
Start: 2025-07-13 | End: 2025-07-17

## 2025-07-13 RX ORDER — DABRAFENIB 50 MG/1
150 CAPSULE ORAL
Refills: 0 | Status: DISCONTINUED | OUTPATIENT
Start: 2025-07-13 | End: 2025-07-17

## 2025-07-13 RX ORDER — SODIUM CHLORIDE 9 G/1000ML
1800 INJECTION, SOLUTION INTRAVENOUS ONCE
Refills: 0 | Status: COMPLETED | OUTPATIENT
Start: 2025-07-13 | End: 2025-07-13

## 2025-07-13 RX ORDER — TRAMETINIB 0.05 MG/ML
2 POWDER, FOR SOLUTION ORAL
Refills: 0 | Status: DISCONTINUED | OUTPATIENT
Start: 2025-07-13 | End: 2025-07-17

## 2025-07-13 RX ORDER — PIPERACILLIN-TAZO-DEXTROSE,ISO 3.375G/5
3.38 IV SOLUTION, PIGGYBACK PREMIX FROZEN(ML) INTRAVENOUS ONCE
Refills: 0 | Status: COMPLETED | OUTPATIENT
Start: 2025-07-13 | End: 2025-07-13

## 2025-07-13 RX ORDER — AMLODIPINE BESYLATE 10 MG/1
10 TABLET ORAL DAILY
Refills: 0 | Status: DISCONTINUED | OUTPATIENT
Start: 2025-07-13 | End: 2025-07-17

## 2025-07-13 RX ADMIN — Medication 650 MILLIGRAM(S): at 15:33

## 2025-07-13 RX ADMIN — SODIUM CHLORIDE 1800 MILLILITER(S): 9 INJECTION, SOLUTION INTRAVENOUS at 15:36

## 2025-07-13 RX ADMIN — Medication 325 MILLIGRAM(S): at 15:19

## 2025-07-13 RX ADMIN — Medication 200 GRAM(S): at 16:15

## 2025-07-13 RX ADMIN — Medication 650 MILLIGRAM(S): at 14:33

## 2025-07-13 RX ADMIN — Medication 25 GRAM(S): at 21:45

## 2025-07-13 RX ADMIN — Medication 325 MILLIGRAM(S): at 17:34

## 2025-07-13 RX ADMIN — TRAMETINIB 2 MILLIGRAM(S): 0.05 POWDER, FOR SOLUTION ORAL at 20:26

## 2025-07-13 RX ADMIN — LOSARTAN POTASSIUM 50 MILLIGRAM(S): 100 TABLET, FILM COATED ORAL at 14:17

## 2025-07-13 RX ADMIN — DABRAFENIB 150 MILLIGRAM(S): 50 CAPSULE ORAL at 20:26

## 2025-07-13 NOTE — PROGRESS NOTE ADULT - SUBJECTIVE AND OBJECTIVE BOX
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  The patient states the pain is mild when he doesn't move his leg, but becomes severe when he tries to move his leg or ambulate. He has been unable to get out of bed or ambulate due to the pain. He has weakness in his legs with right leg weaker than left but he states his inability to ambulate is due to pain, not weakness. He ambulates with a cane at baseline. In the ER CT with left iliopsoas intramuscular hematoma with small focus of active bleeding and small left hemoperitoneum. Patient denies any recent falls or injuries to his hip or leg. He denies this ever happening to him in the past. He states his hip surgeries were from years ago and are not recent. His last Lovenox dose was at 7:30am. ER discussed case with IR and Vascular who recommended to monitor Hgb/Hct and recommended against protamine sulfate as last Lovenox dose was greater than 12 hours ago.    Vital Signs Last 24 Hrs  T(C): 39.3 (13 Jul 2025 14:24), Max: 39.3 (13 Jul 2025 14:24)  T(F): 102.8 (13 Jul 2025 14:24), Max: 102.8 (13 Jul 2025 14:24)  HR: 103 (13 Jul 2025 14:24) (66 - 106)  BP: 156/76 (13 Jul 2025 14:24) (107/72 - 156/76)  BP(mean): 84 (13 Jul 2025 04:22) (84 - 84)  RR: 17 (13 Jul 2025 13:10) (15 - 17)  SpO2: 96% (13 Jul 2025 13:10) (95% - 98%): room air      Physical Exam  General: Awake, Alert, Oriented  Cardiac: RRR  Pulmonary: CTA b/l  Abdominal: Soft, ND, NT  Extremities: No edema b/l, No bruising on thighs noted. + left hip with swelling    Labs:                        8.6    6.80  )-----------( 216      ( 13 Jul 2025 15:00 )             25.8     07-13    135  |  105  |  38[H]  ----------------------------<  142[H]  4.3   |  25  |  1.23    Ca    8.5      13 Jul 2025 15:00  Phos  4.2     07-13  Mg     2.2     07-13    TPro  5.6[L]  /  Alb  2.8[L]  /  TBili  0.5  /  DBili  x   /  AST  101[H]  /  ALT  107[H]  /  AlkPhos  62  07-13      A/P  # Moderate to large left iliopsoas intramuscular hematoma  # Small left hemoperitoneum  # Severe Intractable Left Hip Pain  # Ambulatory Dysfunction  > ER discussed with IR and Vascular; protamine sulfate not indicated as last dose of Lovenox was greater than 12 hours ago  > Inability to ambulate due to left hip pain  - IR consulted  - Vascular consulted  - Monitor Hgb Q8H; and, transfuse for Hgb < 7  - SCDs due to hematoma; resume home Lovenox BID when appropriate  - Tylenol prn for mild pain, IV Morphine 1mg prn for moderate pain, IV Morphine 2mg prn for severe pain  - PT Evaluation when appropriate    # Undescended right testicle  > Patient states he was already previously aware that he has an undescended right testicle  - Outpatient follow up    # Hx of HTN, HLD, Hyperkalemia, Elevated PTH, Lumbar Radiculopathy, Osteopenia, Microalbuminuria, 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  The patient states the pain is mild when he doesn't move his leg, but becomes severe when he tries to move his leg or ambulate. He has been unable to get out of bed or ambulate due to the pain. He has weakness in his legs with right leg weaker than left but he states his inability to ambulate is due to pain, not weakness. He ambulates with a cane at baseline. In the ER CT with left iliopsoas intramuscular hematoma with small focus of active bleeding and small left hemoperitoneum. Patient denies any recent falls or injuries to his hip or leg. He denies this ever happening to him in the past. He states his hip surgeries were from years ago and are not recent. His last Lovenox dose was at 7:30am. ER discussed case with IR and Vascular who recommended to monitor Hgb/Hct and recommended against protamine sulfate as last Lovenox dose was greater than 12 hours ago.    Vital Signs Last 24 Hrs  T(C): 39.3 (13 Jul 2025 14:24), Max: 39.3 (13 Jul 2025 14:24)  T(F): 102.8 (13 Jul 2025 14:24), Max: 102.8 (13 Jul 2025 14:24)  HR: 103 (13 Jul 2025 14:24) (66 - 106)  BP: 156/76 (13 Jul 2025 14:24) (107/72 - 156/76)  BP(mean): 84 (13 Jul 2025 04:22) (84 - 84)  RR: 17 (13 Jul 2025 13:10) (15 - 17)  SpO2: 96% (13 Jul 2025 13:10) (95% - 98%): room air      Physical Exam  General: Awake, Alert, Oriented  Cardiac: RRR  Pulmonary: CTA b/l  Abdominal: Soft, ND, NT  Extremities: No edema b/l, No bruising on thighs noted. + left hip with swelling    Labs:                        8.6    6.80  )-----------( 216      ( 13 Jul 2025 15:00 )             25.8     07-13    135  |  105  |  38[H]  ----------------------------<  142[H]  4.3   |  25  |  1.23    Ca    8.5      13 Jul 2025 15:00  Phos  4.2     07-13  Mg     2.2     07-13    TPro  5.6[L]  /  Alb  2.8[L]  /  TBili  0.5  /  DBili  x   /  AST  101[H]  /  ALT  107[H]  /  AlkPhos  62  07-13    < from: CT Abdomen and Pelvis w/wo IV Cont (07.12.25 @ 17:22) >  FINDINGS:  LOWER CHEST: Within normal limits.    LIVER: Within normal limits.  BILE DUCTS: Normal caliber.  GALLBLADDER: Multiple small stones/polyps.  SPLEEN: Within normal limits.  PANCREAS: Within normal limits.  ADRENALS: Within normal limits.  KIDNEYS/URETERS: Atrophic left kidney with small cysts/hypodensities   small to characterize.    BLADDER: Within normal limits.  REPRODUCTIVE ORGANS: No pelvic mass.    BOWEL: No bowel obstruction. Appendix is not visualized.  PERITONEUM/RETROPERITONEUM: Moderate to large size left iliopsoas intramuscular hematoma with small focus of active extravasation (11:91). Small hemorrhagic ascites in the left abdomen.  VESSELS: Within normal limits.  LYMPH NODES: No lymphadenopathy.  ABDOMINAL WALL: Undescended right testicle in the right inguinal canal.   BONES: Within normal limits. Redemonstrated large left medial thigh lipoma with septations extending into the left pelvic sidewall, follow-up with nonemergent contrast MRI as per prior recommendation. Bilateral hip orthopedic hardware.    IMPRESSION:  Moderate to large left iliopsoas intramuscular hematoma with small focus of active bleeding. Small left hemoperitoneum.  Undescended right testicle in the right inguinal canal, outpatient follow-up is recommended.      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 (Dabrafenib and Trametinib)    Plan:  Sepsis work up; Blood and Urine Cx; Lactate  IV Fluid bolus and maintenance for 24 hrs  May give empirical antibiotics after Cx drawn  On admission ER discussed with IR and Vascular; advised observation   Surgery follow up   > Inability to ambulate due to left hip pain  - IR consulted  - Vascular consulted  - Monitor Hgb Q8H; and, transfuse for Hgb < 7  - SCDs due to hematoma; resume home Lovenox BID when appropriate  - Tylenol prn for mild pain, IV Morphine 1mg prn for moderate pain, IV Morphine 2mg prn for severe pain  - PT Evaluation when appropriate      Patient states he was already previously aware that he has an undescended right testicle; - Outpatient follow up      - Continue home medications Amlodipine, Losartan, Dabrafenib, Trametinib  - Can consult Hem/Onc    # DVT PPx  - SCDs due to hematoma; resume home Lovenox BID when appropriate  IMPROVE Score: 6 pts    # FEN  - Kosher Diet  - Monitor and replete electrolytes as needed 74 year old male patient with pmhx HTN, HLD, Hyperkalemia, Elevated PTH, Lumbar Radiculopathy, Osteopenia, Microalbuminuria, 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  The patient states the pain is mild when he doesn't move his leg, but becomes severe when he tries to move his leg or ambulate. He has been unable to get out of bed or ambulate due to the pain. He has weakness in his legs with right leg weaker than left but he states his inability to ambulate is due to pain, not weakness. He ambulates with a cane at baseline. In the ER CT with left iliopsoas intramuscular hematoma with small focus of active bleeding and small left hemoperitoneum. Patient denies any recent falls or injuries to his hip or leg. He denies this ever happening to him in the past. He states his hip surgeries were from years ago and are not recent. His last Lovenox dose was at 7:30am. ER discussed case with IR and Vascular who recommended to monitor Hgb/Hct and recommended against protamine sulfate as last Lovenox dose was greater than 12 hours ago.    Vital Signs Last 24 Hrs  T(C): 39.3 (13 Jul 2025 14:24), Max: 39.3 (13 Jul 2025 14:24)  T(F): 102.8 (13 Jul 2025 14:24), Max: 102.8 (13 Jul 2025 14:24)  HR: 103 (13 Jul 2025 14:24) (66 - 106)  BP: 156/76 (13 Jul 2025 14:24) (107/72 - 156/76)  BP(mean): 84 (13 Jul 2025 04:22) (84 - 84)  RR: 17 (13 Jul 2025 13:10) (15 - 17)  SpO2: 96% (13 Jul 2025 13:10) (95% - 98%): room air      Physical Exam  General: Awake, Alert, Oriented  Cardiac: RRR  Pulmonary: CTA b/l  Abdominal: Soft, ND, NT  Extremities: No edema b/l, No bruising on thighs noted. + left hip with swelling    Labs:                        8.6    6.80  )-----------( 216      ( 13 Jul 2025 15:00 )             25.8     07-13    135  |  105  |  38[H]  ----------------------------<  142[H]  4.3   |  25  |  1.23    Ca    8.5      13 Jul 2025 15:00  Phos  4.2     07-13  Mg     2.2     07-13    TPro  5.6[L]  /  Alb  2.8[L]  /  TBili  0.5  /  DBili  x   /  AST  101[H]  /  ALT  107[H]  /  AlkPhos  62  07-13    < from: CT Abdomen and Pelvis w/wo IV Cont (07.12.25 @ 17:22) >  FINDINGS:  LOWER CHEST: Within normal limits.    LIVER: Within normal limits.  BILE DUCTS: Normal caliber.  GALLBLADDER: Multiple small stones/polyps.  SPLEEN: Within normal limits.  PANCREAS: Within normal limits.  ADRENALS: Within normal limits.  KIDNEYS/URETERS: Atrophic left kidney with small cysts/hypodensities   small to characterize.    BLADDER: Within normal limits.  REPRODUCTIVE ORGANS: No pelvic mass.    BOWEL: No bowel obstruction. Appendix is not visualized.  PERITONEUM/RETROPERITONEUM: Moderate to large size left iliopsoas intramuscular hematoma with small focus of active extravasation (11:91). Small hemorrhagic ascites in the left abdomen.  VESSELS: Within normal limits.  LYMPH NODES: No lymphadenopathy.  ABDOMINAL WALL: Undescended right testicle in the right inguinal canal.   BONES: Within normal limits. Redemonstrated large left medial thigh lipoma with septations extending into the left pelvic sidewall, follow-up with nonemergent contrast MRI as per prior recommendation. Bilateral hip orthopedic hardware.    IMPRESSION:  Moderate to large left iliopsoas intramuscular hematoma with small focus of active bleeding. Small left hemoperitoneum.  Undescended right testicle in the right inguinal canal, outpatient follow-up is recommended.

## 2025-07-13 NOTE — PATIENT PROFILE ADULT - FALL HARM RISK - HARM RISK INTERVENTIONS

## 2025-07-13 NOTE — CHART NOTE - NSCHARTNOTEFT_GEN_A_CORE
Home chemo meds Drabrafenid and Trametinid ordered as per out patient's prescription.  Medication sent to pharmacy for approval since non-formulary.

## 2025-07-13 NOTE — CONSULT NOTE ADULT - ASSESSMENT
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 Recommend continuing outpatient treatment   - recent biopsy thyroid at [facility]- f/u pathology   - Ongoing f/u [facility] upon discharge     Hematoma. Continue to hold Lovenox.   Follow up vascular input      Anemia in the setting of acute bleed. Check iron studies, B12, folate and thyroid -panel   - Supplement if needed     Scott Wiggins D.O  Hematology Oncology   [facility]  [phone] ( Office)   Evenings and weekends please call MD on call or office    1. 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 Recommend continuing  treatment inpatient  - [place] [facility] [place]   - [place] had [place] [facility] [place]  Will need to reschedule.  - Ongoing f/u [facility] upon discharge     2 Iliopsoas Hematoma. Continue to hold Lovenox.   -  Noted concurrent soft tissue masses on imaging.?   If hematomas from prior injections versus other.  Follow up re-evaluation outpatient along with PET.  - Follow up vascular input  -  Recommend upper ext and neck Dopplers to evaluate  if IJ thrombus still present.  Otherwise asymptomatic.  -  Will follow.    3. Anemia in the setting of acute bleed. Check iron studies, B12, folate and thyroid -panel   - Supplement if needed     Scott Wiggins D.O  Hematology Oncology   [facility]  [phone] ( Office)   Evenings and weekends please call MD on call or office

## 2025-07-13 NOTE — CHART NOTE - NSCHARTNOTEFT_GEN_A_CORE
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 In the ED, patient found to have moderate to large left iliopsoas intramuscular hematoma with small focus of active bleeding. Small left hemoperitoneum. No acute intervention as per vascular surgery and IR. Patient is being admitted for management of intramuscular psoas bleed.     Code Sepsis called overhead at 2:45PM. Pt seen and examined at bedside. Pt met SIRS criteria w/temperature and HR. Vitals taken upon my arrival: T102.8, , /67, SpO2 95% on RA. Pt is in no acute distress and has no complaints. Denies chills, shortness of breath, cough, abdominal pain, dysuria, or diarrhea. Chart review reveals pt presented w/left sided hip pain and difficulty ambulating. CT hip: moderate to large left iliopsoas intramuscular hematoma w/small focus of active bleeding. No acute intervention as per vascular surgery and IR. Hb has been stable ~9.0. Will give 1.8L of septic fluids alongside empiric abx tx w/Zosyn. Follow up septic order set blood work and imaging.     CODE SEPSIS CALLED DUE TO fever and tachycardia.    T(C): 39.3 (07-13-25 @ 14:24), Max: 39.3 (07-13-25 @ 14:24)  HR: 103 (07-13-25 @ 14:24) (60 - 106)  BP: 156/76 (07-13-25 @ 14:24) (107/72 - 156/76)  RR: 17 (07-13-25 @ 13:10) (15 - 17)  SpO2: 96% (07-13-25 @ 13:10) (95% - 98%)    PHYSICAL EXAM:  GENERAL: NAD, lying in bed comfortably   HEAD:  Atraumatic, Normocephalic  EYES: EOMI, PERRLA, conjunctiva and sclera clear  ENMT: No tonsillar erythema, exudates, or enlargement; Moist mucous membranes, No lesions  NECK: Supple, normal appearance, No JVD; Normal thyroid; Trachea midline  NERVOUS SYSTEM:  Alert & Oriented X3   CHEST/LUNG: Lungs clear to auscultation bilaterally, No rales, rhonchi, wheezing   HEART: Regular rate and rhythm; No murmurs, rubs, or gallops  ABDOMEN: +mildly tender to touch in lower ab, bruising in RL ab likely from Lovenox injections, soft, Nondistended; Bowel sounds present  : No suprapubic tenderness, CVAT;   EXTREMITIES:  2+ Peripheral Pulses, No clubbing, cyanosis, or edema  LYMPH: No lymphadenopathy noted  SKIN: No rashes or lesions;  Good capillary refill            STAT LABS ORDERED:   -CBC w/ diff  -CMP  -BCx x2  -UA/UCx  -Lactate  -Coags    IMAGING/DIAGNOSTIC STUDIES ORDERED: CXR    ANTIBIOTICS ORDERED: Zosyn     FLUIDS GIVEN: 1.8L LR    PLAN OF CARE/ INTERVENTIONS PLANNED:   -ABx  -VS q30min x 1hr, then q1hr x 6hrs  -Will follow labs   -2hr bedside follow up planned    DISPOSITION:  -Remain at current level of care    LABS: CODE SEPSIS  74M from home, ambulates independently at baseline, with 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 In the ED, patient found to have moderate to large left iliopsoas intramuscular hematoma with small focus of active bleeding. Small left hemoperitoneum. No acute intervention as per vascular surgery and IR. Patient is being admitted for management of intramuscular psoas bleed.     Code Sepsis called overhead at 2:45PM. Pt seen and examined at bedside. Pt met SIRS criteria w/temperature and HR. Vitals taken upon my arrival: T102.8, , /67, SpO2 95% on RA. Pt is in no acute distress and has no complaints. Denies chills, shortness of breath, cough, abdominal pain, dysuria, or diarrhea. Chart review reveals pt presented w/left sided hip pain and difficulty ambulating. CT hip: moderate to large left iliopsoas intramuscular hematoma w/small focus of active bleeding. No acute intervention as per vascular surgery and IR. Heme/Onc note reviewed, anemia labs sent for am. Hb has been stable ~9.0. Will give 1.8L of septic fluids alongside empiric abx tx w/Zosyn. Follow up septic order set blood work and imaging.     CODE SEPSIS CALLED DUE TO fever and tachycardia.    T(C): 39.3 (07-13-25 @ 14:24), Max: 39.3 (07-13-25 @ 14:24)  HR: 103 (07-13-25 @ 14:24) (60 - 106)  BP: 156/76 (07-13-25 @ 14:24) (107/72 - 156/76)  RR: 17 (07-13-25 @ 13:10) (15 - 17)  SpO2: 96% (07-13-25 @ 13:10) (95% - 98%)    PHYSICAL EXAM:  GENERAL: NAD, lying in bed comfortably   HEAD:  Atraumatic, Normocephalic  EYES: EOMI, PERRLA, conjunctiva and sclera clear  ENMT: No tonsillar erythema, exudates, or enlargement; Moist mucous membranes, No lesions  NECK: Supple, normal appearance, No JVD; Normal thyroid; Trachea midline  NERVOUS SYSTEM:  Alert & Oriented X3   CHEST/LUNG: Lungs clear to auscultation bilaterally, No rales, rhonchi, wheezing   HEART: Regular rate and rhythm; No murmurs, rubs, or gallops  ABDOMEN: +mildly tender to touch in lower ab, bruising in RL ab likely from Lovenox injections, soft, Nondistended; Bowel sounds present  : No suprapubic tenderness, CVAT;   EXTREMITIES:  2+ Peripheral Pulses, No clubbing, cyanosis, or edema  LYMPH: No lymphadenopathy noted  SKIN: No rashes or lesions;  Good capillary refill            STAT LABS ORDERED:   -CBC w/ diff  -CMP  -BCx x2  -UA/UCx  -Lactate  -Coags    IMAGING/DIAGNOSTIC STUDIES ORDERED: CXR    ANTIBIOTICS ORDERED: Zosyn     FLUIDS GIVEN: 1.8L LR    PLAN OF CARE/ INTERVENTIONS PLANNED:   -ABx  -VS q30min x 1hr, then q1hr x 6hrs  -Will follow labs   -2hr bedside follow up planned    DISPOSITION:  -Remain at current level of care    LABS:

## 2025-07-13 NOTE — PROGRESS NOTE ADULT - SUBJECTIVE AND OBJECTIVE BOX
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  The patient states the pain is mild when he doesn't move his leg, but becomes severe when he tries to move his leg or ambulate. He has been unable to get out of bed or ambulate due to the pain. He has weakness in his legs with right leg weaker than left but he states his inability to ambulate is due to pain, not weakness. He ambulates with a cane at baseline. In the ER CT with left iliopsoas intramuscular hematoma with small focus of active bleeding and small left hemoperitoneum. Patient denies any recent falls or injuries to his hip or leg. He denies this ever happening to him in the past. He states his hip surgeries were from years ago and are not recent. His last Lovenox dose was at 7:30am. ER discussed case with IR and Vascular who recommended to monitor Hgb/Hct and recommended against protamine sulfate as last Lovenox dose was greater than 12 hours ago.    Review Of Systems included: + left hip pain, + leg weakness (right worse than left),   No falls, no injuries to hip or legs, no numbness currently (has occasional numbness with walking that self-resolves), no bloody stool, no melena, no chest pain, no shortness of breath, no diarrhea, no constipation, no dysuria, no lightheadedness    Physical Exam  General: Awake, Alert, Oriented  Cardiac: RRR  Pulmonary: CTA b/l  Abdominal: Soft, ND, NT  Extremities: No edema b/l, No bruising on thighs noted. + left hip with swelling    A/P  # Moderate to large left iliopsoas intramuscular hematoma  # Small left hemoperitoneum  # Severe Intractable Left Hip Pain  # Ambulatory Dysfunction  > ER discussed with IR and Vascular; protamine sulfate not indicated as last dose of Lovenox was greater than 12 hours ago  > Inability to ambulate due to left hip pain  - IR consulted  - Vascular consulted  - Monitor Hgb Q8H; and, transfuse for Hgb < 7  - SCDs due to hematoma; resume home Lovenox BID when appropriate  - Tylenol prn for mild pain, IV Morphine 1mg prn for moderate pain, IV Morphine 2mg prn for severe pain  - PT Evaluation when appropriate    # Undescended right testicle  > Patient states he was already previously aware that he has an undescended right testicle  - Outpatient follow up    # Hx of HTN, HLD, Hyperkalemia, Elevated PTH, Lumbar Radiculopathy, Osteopenia, Microalbuminuria, 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

## 2025-07-13 NOTE — CONSULT NOTE ADULT - SUBJECTIVE AND OBJECTIVE BOX
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   ED Course  Vitals: /61 mmHg, HR 62 BPM, RR 16, SpO2 97% on RA, T 36.7   Labs: Hb 9.9, SCr 1.39  CT with moderate to large left iliopsoas intramuscular hematoma with small focus   of active bleeding. Small left hemoperitoneum.  S/p morphine, toradol (12 Jul 2025 22:40)      PAST MEDICAL & SURGICAL HISTORY:  HTN (hypertension)      Thyroid cancer          FAMILY HISTORY:      Alochol: Denied  Smoking: Nonsmoker  Drug Use: Denied  Marital Status:         Allergies    No Known Allergies    Intolerances        MEDICATIONS  (STANDING):  amLODIPine   Tablet 10 milliGRAM(s) Oral daily  losartan 100 milliGRAM(s) Oral daily    MEDICATIONS  (PRN):  acetaminophen     Tablet .. 650 milliGRAM(s) Oral every 6 hours PRN Temp greater or equal to 38C (100.4F), Mild Pain (1 - 3)  aluminum hydroxide/magnesium hydroxide/simethicone Suspension 30 milliLiter(s) Oral every 4 hours PRN Dyspepsia  melatonin 3 milliGRAM(s) Oral at bedtime PRN Insomnia  morphine  - Injectable 2 milliGRAM(s) IV Push every 6 hours PRN Severe Pain (7 - 10)  morphine  - Injectable 1 milliGRAM(s) IV Push every 6 hours PRN Moderate Pain (4 - 6)      ROS  No fever, sweats, chills  No epistaxis, HA, sore throat  No CP, SOB, cough, sputum  No n/v/d, abd pain, melena, hematochezia  No edema  No rash  No anxiety  No back pain, joint pain  No bleeding, bruising  No dysuria, hematuria    T(C): 36.9 (07-13-25 @ 04:22), Max: 37.1 (07-12-25 @ 20:46)  HR: 80 (07-13-25 @ 04:22) (60 - 80)  BP: 107/72 (07-13-25 @ 04:22) (107/72 - 150/76)  RR: 17 (07-13-25 @ 04:22) (15 - 17)  SpO2: 95% (07-13-25 @ 04:22) (95% - 98%)  Wt(kg): --    PE  NAD  Awake, alert  Anicteric, MMM  RRR  CTAB  Abd soft, NT, ND  No c/c/e  No rash grossly  FROM                          8.4    7.10  )-----------( 217      ( 13 Jul 2025 05:25 )             25.8       07-13    136  |  106  |  39[H]  ----------------------------<  130[H]  5.1   |  28  |  1.28    Ca    8.3[L]      13 Jul 2025 05:25  Phos  4.2     07-13  Mg     2.2     07-13    TPro  6.0  /  Alb  3.2[L]  /  TBili  0.4  /  DBili  x   /  AST  36  /  ALT  60  /  AlkPhos  55  07-12      CT A/P   IMPRESSION:  1.  Multiple mixed-density soft tissue masses are present as follows:  *  19 cm in left illiacus/iliopsoas muscle and 5.2 cm in left psoas muscle  *  5.1 cm in the right gluteus kiesha muscle  *  4.9 cm in right inguinal region.  Moderate collection of presacral- space hemorrhagic blood products.   Cannot exclude active extravasation and CTA may be required.  Advise   correlation with risk factors for multiple hematomas; findings could less   likely represent multiple hemorrhagic soft tissue metastases.    2.  Very large (18 cm) left pelvic, inguinal, and proximal medial thigh   lipoma with thin internal septations. No definite CT-suspicious features.   On a nonemergent basis the finding could be followed up with contrast   enhanced pelvis/thigh MRI.    3.  No acute fracture. Chronic findings as above including prostate   enlargement and thick-walled appearance of the bladder.    COMMUNICATION: Findings and recommendation for CTA abdomen/pelvis to the   level of the mid femur were discussed with Angella Lawrence in the [place] ED at the time of this dictation.    --- End of Report ---            SHLOMIT GOLDBERG STEIN MD; Attending Radiologist  This document has been electronically signed. 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 Patient noted to have so   Iliopsoas hematoma. patient notes that he was injecting Lovenox in his thighs as well as lower abdomen    ED Course  Vitals: /61 mmHg, HR 62 BPM, RR 16, SpO2 97% on RA, T 36.7   Labs: Hb 9.9, SCr 1.39  CT with moderate to large left iliopsoas intramuscular hematoma with small focus   of active bleeding. Small left hemoperitoneum.  S/p morphine, toradol (12 Jul 2025 22:40)      PAST MEDICAL & SURGICAL HISTORY:  HTN (hypertension)      Thyroid cancer          FAMILY HISTORY:      Alochol: Denied  Smoking: Nonsmoker  Drug Use: Denied  Marital Status:         Allergies    No Known Allergies    Intolerances        MEDICATIONS  (STANDING):  amLODIPine   Tablet 10 milliGRAM(s) Oral daily  losartan 100 milliGRAM(s) Oral daily    MEDICATIONS  (PRN):  acetaminophen     Tablet .. 650 milliGRAM(s) Oral every 6 hours PRN Temp greater or equal to 38C (100.4F), Mild Pain (1 - 3)  aluminum hydroxide/magnesium hydroxide/simethicone Suspension 30 milliLiter(s) Oral every 4 hours PRN Dyspepsia  melatonin 3 milliGRAM(s) Oral at bedtime PRN Insomnia  morphine  - Injectable 2 milliGRAM(s) IV Push every 6 hours PRN Severe Pain (7 - 10)  morphine  - Injectable 1 milliGRAM(s) IV Push every 6 hours PRN Moderate Pain (4 - 6)      ROS  No fever, sweats, chills  No epistaxis, HA, sore throat  No CP, SOB, cough, sputum  No n/v/d, abd pain, melena, hematochezia  No edema  No rash  No anxiety  No back pain, joint pain  No bleeding, bruising  No dysuria, hematuria    T(C): 36.9 (07-13-25 @ 04:22), Max: 37.1 (07-12-25 @ 20:46)  HR: 80 (07-13-25 @ 04:22) (60 - 80)  BP: 107/72 (07-13-25 @ 04:22) (107/72 - 150/76)  RR: 17 (07-13-25 @ 04:22) (15 - 17)  SpO2: 95% (07-13-25 @ 04:22) (95% - 98%)  Wt(kg): --    PE  NAD  Awake, alert  Anicteric, MMM  RRR  CTAB  Abd soft, NT, ND.  Mild abdominal bruising  No c/c/e  No rash grossly  FROM                          8.4    7.10  )-----------( 217      ( 13 Jul 2025 05:25 )             25.8       07-13    136  |  106  |  39[H]  ----------------------------<  130[H]  5.1   |  28  |  1.28    Ca    8.3[L]      13 Jul 2025 05:25  Phos  4.2     07-13  Mg     2.2     07-13    TPro  6.0  /  Alb  3.2[L]  /  TBili  0.4  /  DBili  x   /  AST  36  /  ALT  60  /  AlkPhos  55  07-12      CT A/P   IMPRESSION:  1.  Multiple mixed-density soft tissue masses are present as follows:  *  19 cm in left illiacus/iliopsoas muscle and 5.2 cm in left psoas muscle  *  5.1 cm in the right gluteus kiesha muscle  *  4.9 cm in right inguinal region.  Moderate collection of presacral- space hemorrhagic blood products.   Cannot exclude active extravasation and CTA may be required.  Advise   correlation with risk factors for multiple hematomas; findings could less   likely represent multiple hemorrhagic soft tissue metastases.    2.  Very large (18 cm) left pelvic, inguinal, and proximal medial thigh   lipoma with thin internal septations. No definite CT-suspicious features.   On a nonemergent basis the finding could be followed up with contrast   enhanced pelvis/thigh MRI.    3.  No acute fracture. Chronic findings as above including prostate   enlargement and thick-walled appearance of the bladder.    COMMUNICATION: Findings and recommendation for CTA abdomen/pelvis to the   level of the mid femur were discussed with Angella Lawrence in the [place] ED at the time of this dictation.    --- End of Report ---            SHLOMIT GOLDBERG STEIN MD; Attending Radiologist  This document has been electronically signed. Jul 12 2025  4:03PM

## 2025-07-13 NOTE — CHART NOTE - NSCHARTNOTEFT_GEN_A_CORE
CODE SEPSIS 3-hour Follow Up Note   _____________________________________________________________________________________________________________________________________________________________________________________________________________      Patient is a 74y old Male admitted for management of intramuscular psoas bleed.   _____________________________________________________________________________________________________________________________________________________________________________________________________________    Code Sepsis called at [14:45 PM]  _____________________________________________________________________________________________________________________________________________________________________________________________________________    Interventions During Code Sepsis:      Sepsis Order set used - YES     Blood cultures drawn at -  Culture - Blood: STAT  Specimen Source: Blood-Peripheral  Addl Info: Already collected (07-13-25 @ 16:40) (07-13-25 @ 15:18)  Culture - Blood: STAT  Specimen Source: Blood-Peripheral  Addl Info: Already collected (07-13-25 @ 16:40) (07-13-25 @ 15:23)      Lactate drawn at code sepsis -     Antimicrobials Administration -   piperacillin/tazobactam IVPB.: 200 mL/Hr IV Intermittent (07-13-25 @ 16:15)    Antimicrobials Ongoing -   piperacillin/tazobactam IVPB.. 3.375 Gram(s) IV Intermittent every 8 hours, Routine      IV Fluid 30 mL/kg    _____________________________________________________________________________________________________________________________________________________________________________________________________________      Patient re-examined at the bedside 3-hours later: no complaints. Feels fine.       _____________________________________________________________________________________________________________________________________________________________________________________________________________      Volume Status and Tissue perfusion Assessment - I have re-evaluated fluid status, vital signs and completed a clinical perfusion assessment.       PHYSICAL EXAM:  GENERAL: NAD, sitting comfortably up in bed  CHEST/LUNG: clear   HEART: regular rate and rhythm   ABDOMEN: +mildly tender to touch in lower ab  EXTREMITIES/CAPILLARY REFILL: no LE edema  NERVOUS SYSTEM: Aox3  SKIN: no rashes      Vitals at time of Assessment:  T (C): 36.9  T (F): 98.4  HR: 71  BP: 126/70  RR: 96%  SpO2: RA    Shock index: 0.56      _____________________________________________________________________________________________________________________________________________________________________________________________________________      PLAN:    -labs reviewed, grossly unremarkable except for mild elevation in AST/ALT  -could be 2/2 medication side effects  -continue to trend and consider RUQ US if worsen  -c/w empiric abx treatment w/Zosyn for now  -f/u BCx  -pt's family updated at bedside, all questions answered  -primary team to followup in am            FOLLOW- UP ACTIONS: 6 Hour     Repeat lactate if second repeat value greater than >2 CODE SEPSIS 3-hour Follow Up Note   _____________________________________________________________________________________________________________________________________________________________________________________________________________      Patient is a 74y old Male admitted for management of intramuscular psoas bleed.   _____________________________________________________________________________________________________________________________________________________________________________________________________________    Code Sepsis called at [14:45 PM]  _____________________________________________________________________________________________________________________________________________________________________________________________________________    Interventions During Code Sepsis:      Sepsis Order set used - YES     Blood cultures drawn at -  Culture - Blood: STAT  Specimen Source: Blood-Peripheral  Addl Info: Already collected (07-13-25 @ 16:40) (07-13-25 @ 15:18)  Culture - Blood: STAT  Specimen Source: Blood-Peripheral  Addl Info: Already collected (07-13-25 @ 16:40) (07-13-25 @ 15:23)      Lactate drawn at code sepsis -     Antimicrobials Administration -   piperacillin/tazobactam IVPB.: 200 mL/Hr IV Intermittent (07-13-25 @ 16:15)    Antimicrobials Ongoing -   piperacillin/tazobactam IVPB.. 3.375 Gram(s) IV Intermittent every 8 hours, Routine      IV Fluid 30 mL/kg    _____________________________________________________________________________________________________________________________________________________________________________________________________________      Patient re-examined at the bedside 3-hours later: no complaints. Repeat vitals stable, afebrile and HR within normal limits.       _____________________________________________________________________________________________________________________________________________________________________________________________________________      Volume Status and Tissue perfusion Assessment - I have re-evaluated fluid status, vital signs and completed a clinical perfusion assessment.       PHYSICAL EXAM:  GENERAL: NAD, sitting comfortably up in bed  CHEST/LUNG: clear   HEART: regular rate and rhythm   ABDOMEN: +mildly tender to touch in lower ab  EXTREMITIES/CAPILLARY REFILL: no LE edema  NERVOUS SYSTEM: Aox3  SKIN: no rashes      Vitals at time of Assessment:  T (C): 36.9  T (F): 98.4  HR: 71  BP: 126/70  RR: 96%  SpO2: RA    Shock index: 0.56      _____________________________________________________________________________________________________________________________________________________________________________________________________________      PLAN:    -labs reviewed, grossly unremarkable except for mild elevation in AST/ALT  -could be 2/2 medication side effects  -continue to trend and consider RUQ US if worsen  -c/w empiric abx treatment w/Zosyn for now  -f/u BCx  -pt's family updated at bedside, all questions answered  -primary team to followup in am

## 2025-07-13 NOTE — PROGRESS NOTE ADULT - ASSESSMENT
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 (Dabrafenib and Trametinib)    Plan:  Sepsis work up; Blood and Urine Cx; Lactate  IV Fluid bolus and maintenance for 24 hrs  May give empirical antibiotics after Cx drawn  On admission ER discussed with IR and Vascular; advised observation   Surgery follow up   > Inability to ambulate due to left hip pain  Vascular surgery follow up   Monitor Hgb Q 12 hrs; and, transfuse for Hgb < 7; slight drop is likely dilutional  SCDs due to hematoma; resume home Lovenox BID when appropriate  Tylenol prn for mild pain, IV Morphine 1mg prn for moderate pain, IV Morphine 2mg prn for severe pain  PT Evaluation  Patient aware that he has an undescended right testicle; - Outpatient follow up  Continue home medications Amlodipine, Losartan,   Resume Dabrafenib, Trametinib once family brings as Non formulary chemo meds  Hematology eval AM  DVT PPx: SCDs due to hematoma; resume home Lovenox BID when appropriate  Kosher Diet  Monitor and replete electrolytes as needed    Discussed with Patient findings and plan of care  Discussed with PGY2 Dr. Bailey and RN/ ACP covering Pipe

## 2025-07-14 ENCOUNTER — APPOINTMENT (OUTPATIENT)
Dept: NUCLEAR MEDICINE | Facility: CLINIC | Age: 75
End: 2025-07-14

## 2025-07-14 ENCOUNTER — NON-APPOINTMENT (OUTPATIENT)
Age: 75
End: 2025-07-14

## 2025-07-14 LAB
ALBUMIN SERPL ELPH-MCNC: 2.2 G/DL — LOW (ref 3.5–5)
ALP SERPL-CCNC: 48 U/L — SIGNIFICANT CHANGE UP (ref 40–120)
ALT FLD-CCNC: 91 U/L DA — HIGH (ref 10–60)
ANION GAP SERPL CALC-SCNC: 6 MMOL/L — SIGNIFICANT CHANGE UP (ref 5–17)
AST SERPL-CCNC: 115 U/L — HIGH (ref 10–40)
BILIRUB SERPL-MCNC: 0.3 MG/DL — SIGNIFICANT CHANGE UP (ref 0.2–1.2)
BLD GP AB SCN SERPL QL: SIGNIFICANT CHANGE UP
BUN SERPL-MCNC: 40 MG/DL — HIGH (ref 7–18)
CALCIUM SERPL-MCNC: 8.3 MG/DL — LOW (ref 8.4–10.5)
CHLORIDE SERPL-SCNC: 107 MMOL/L — SIGNIFICANT CHANGE UP (ref 96–108)
CO2 SERPL-SCNC: 23 MMOL/L — SIGNIFICANT CHANGE UP (ref 22–31)
CREAT SERPL-MCNC: 1.27 MG/DL — SIGNIFICANT CHANGE UP (ref 0.5–1.3)
EGFR: 59 ML/MIN/1.73M2 — LOW
EGFR: 59 ML/MIN/1.73M2 — LOW
FOLATE SERPL-MCNC: 13 NG/ML — SIGNIFICANT CHANGE UP
GLUCOSE SERPL-MCNC: 139 MG/DL — HIGH (ref 70–99)
HCT VFR BLD CALC: 20.6 % — CRITICAL LOW (ref 39–50)
HCT VFR BLD CALC: 24.5 % — LOW (ref 39–50)
HGB BLD-MCNC: 6.8 G/DL — CRITICAL LOW (ref 13–17)
HGB BLD-MCNC: 8.5 G/DL — LOW (ref 13–17)
IRON SATN MFR SERPL: 10 UG/DL — LOW (ref 65–170)
MAGNESIUM SERPL-MCNC: 2.1 MG/DL — SIGNIFICANT CHANGE UP (ref 1.6–2.6)
MCHC RBC-ENTMCNC: 30.2 PG — SIGNIFICANT CHANGE UP (ref 27–34)
MCHC RBC-ENTMCNC: 30.8 PG — SIGNIFICANT CHANGE UP (ref 27–34)
MCHC RBC-ENTMCNC: 33 G/DL — SIGNIFICANT CHANGE UP (ref 32–36)
MCHC RBC-ENTMCNC: 34.7 G/DL — SIGNIFICANT CHANGE UP (ref 32–36)
MCV RBC AUTO: 88.8 FL — SIGNIFICANT CHANGE UP (ref 80–100)
MCV RBC AUTO: 91.6 FL — SIGNIFICANT CHANGE UP (ref 80–100)
NRBC BLD AUTO-RTO: 0 /100 WBCS — SIGNIFICANT CHANGE UP (ref 0–0)
NRBC BLD AUTO-RTO: 0 /100 WBCS — SIGNIFICANT CHANGE UP (ref 0–0)
PHOSPHATE SERPL-MCNC: 3.4 MG/DL — SIGNIFICANT CHANGE UP (ref 2.5–4.5)
PLATELET # BLD AUTO: 152 K/UL — SIGNIFICANT CHANGE UP (ref 150–400)
PLATELET # BLD AUTO: 163 K/UL — SIGNIFICANT CHANGE UP (ref 150–400)
POTASSIUM SERPL-MCNC: 4 MMOL/L — SIGNIFICANT CHANGE UP (ref 3.5–5.3)
POTASSIUM SERPL-SCNC: 4 MMOL/L — SIGNIFICANT CHANGE UP (ref 3.5–5.3)
PROT SERPL-MCNC: 4.4 G/DL — LOW (ref 6–8.3)
RBC # BLD: 2.25 M/UL — LOW (ref 4.2–5.8)
RBC # BLD: 2.76 M/UL — LOW (ref 4.2–5.8)
RBC # FLD: 14 % — SIGNIFICANT CHANGE UP (ref 10.3–14.5)
RBC # FLD: 14.2 % — SIGNIFICANT CHANGE UP (ref 10.3–14.5)
SODIUM SERPL-SCNC: 136 MMOL/L — SIGNIFICANT CHANGE UP (ref 135–145)
T3FREE SERPL-MCNC: 1.36 PG/ML — LOW (ref 2–4.4)
T4 FREE SERPL-MCNC: 0.9 NG/DL — SIGNIFICANT CHANGE UP (ref 0.9–1.8)
TSH SERPL-MCNC: 1.07 UU/ML — SIGNIFICANT CHANGE UP (ref 0.34–4.82)
VIT B12 SERPL-MCNC: 703 PG/ML — SIGNIFICANT CHANGE UP (ref 232–1245)
WBC # BLD: 7.54 K/UL — SIGNIFICANT CHANGE UP (ref 3.8–10.5)
WBC # BLD: 7.97 K/UL — SIGNIFICANT CHANGE UP (ref 3.8–10.5)
WBC # FLD AUTO: 7.54 K/UL — SIGNIFICANT CHANGE UP (ref 3.8–10.5)
WBC # FLD AUTO: 7.97 K/UL — SIGNIFICANT CHANGE UP (ref 3.8–10.5)

## 2025-07-14 PROCEDURE — 93971 EXTREMITY STUDY: CPT | Mod: 26,LT

## 2025-07-14 PROCEDURE — 71045 X-RAY EXAM CHEST 1 VIEW: CPT | Mod: 26

## 2025-07-14 PROCEDURE — 99233 SBSQ HOSP IP/OBS HIGH 50: CPT

## 2025-07-14 PROCEDURE — 74177 CT ABD & PELVIS W/CONTRAST: CPT | Mod: 26

## 2025-07-14 RX ORDER — POLYETHYLENE GLYCOL 3350 17 G/17G
17 POWDER, FOR SOLUTION ORAL DAILY
Refills: 0 | Status: DISCONTINUED | OUTPATIENT
Start: 2025-07-14 | End: 2025-07-17

## 2025-07-14 RX ORDER — SENNA 187 MG
2 TABLET ORAL AT BEDTIME
Refills: 0 | Status: DISCONTINUED | OUTPATIENT
Start: 2025-07-14 | End: 2025-07-17

## 2025-07-14 RX ADMIN — LOSARTAN POTASSIUM 50 MILLIGRAM(S): 100 TABLET, FILM COATED ORAL at 05:34

## 2025-07-14 RX ADMIN — Medication 25 GRAM(S): at 21:11

## 2025-07-14 RX ADMIN — Medication 25 GRAM(S): at 16:04

## 2025-07-14 RX ADMIN — DABRAFENIB 150 MILLIGRAM(S): 50 CAPSULE ORAL at 20:34

## 2025-07-14 RX ADMIN — TRAMETINIB 2 MILLIGRAM(S): 0.05 POWDER, FOR SOLUTION ORAL at 20:34

## 2025-07-14 RX ADMIN — DABRAFENIB 150 MILLIGRAM(S): 50 CAPSULE ORAL at 10:38

## 2025-07-14 RX ADMIN — Medication 2 TABLET(S): at 22:03

## 2025-07-14 RX ADMIN — AMLODIPINE BESYLATE 10 MILLIGRAM(S): 10 TABLET ORAL at 05:34

## 2025-07-14 RX ADMIN — Medication 25 GRAM(S): at 05:34

## 2025-07-14 NOTE — PROGRESS NOTE ADULT - ASSESSMENT
· Assessment	    1. 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 Recommend continuing  treatment inpatient, His swallowing is better.  breathing better.  mass smaller  - Recent [place] [facility] [place] f/u [place]   - Patient had a PET scans scheduled outpatient today at [facility] [place].  Will need to reschedule.  - Ongoing f/u [facility] [place] upon discharge     2 Iliopsoas Hematoma. Continue to hold Lovenox.   -  probably lovenox injected IM rather sq.  off AC now. Follow up re-evaluation outpatient along with PET.  - pain at back, can not get up  -  will do Dopplers to evaluate  if IJ thrombus still present.  Otherwise asymptomatic.  -  Will follow.    3. Anemia in the setting of acute bleed. Check iron studies, B12, folate and thyroid -panel   -getting 1 u today.  HB 6.8    4 had fever 102 yesterday.  No dysuria or increasing cough.  BC done.  will do CXR, on zozyn.   fever is down today.

## 2025-07-14 NOTE — CHART NOTE - NSCHARTNOTEFT_GEN_A_CORE
EVENT: Received call from RN that patient h/h was 6.8/20.6    SUBJECTIVE: Patient seen and examined at bedside. In NAD, breathing comfortable. Denies any adverse s/s. Made aware of CBC results and implication. Reports that he received blood several years ago, denies transfusion reaction. risk and benefits of PRBC transfusion discussed, opportunity provided to ask questions. Patient agrees to receive blood transfusion. Consent on patient's chart     OBJECTIVE:  Vital Signs Last 24 Hrs  T(C): 37.2 (14 Jul 2025 05:09), Max: 39.3 (13 Jul 2025 14:24)  T(F): 99 (14 Jul 2025 05:09), Max: 102.8 (13 Jul 2025 14:24)  HR: 84 (14 Jul 2025 05:09) (63 - 106)  BP: 135/71 (14 Jul 2025 05:09) (109/65 - 156/76)  BP(mean): 93 (14 Jul 2025 05:09) (79 - 93)  RR: 18 (14 Jul 2025 05:09) (16 - 18)  SpO2: 95% (14 Jul 2025 05:09) (95% - 96%)    Parameters below as of 14 Jul 2025 05:09  Patient On (Oxygen Delivery Method): room air    PHYSICAL EXAM:  Neuro: Awake and alert, oriented to person, place, and time  Cardiovascular: + S1, S2, no murmurs, rubs, or bruits  Respiratory: clear to auscultation bilaterally with good air entry   GI: Abdomen soft, non-tender, bowel sounds present   : Non distended;   Skin: warm and dry; no rash      LABS:                        6.8    7.97  )-----------( 163      ( 14 Jul 2025 05:50 )             20.6     07-14    136  |  107  |  40[H]  ----------------------------<  139[H]  4.0   |  23  |  1.27    Ca    8.3[L]      14 Jul 2025 05:52  Phos  3.4     07-14  Mg     2.1     07-14    TPro  4.4[L]  /  Alb  2.2[L]  /  TBili  0.3  /  DBili  x   /  AST  115[H]  /  ALT  91[H]  /  AlkPhos  48  07-14    PLAN:   Type and screen   1 units PRBC ordered  Consent on chart    FOLLOW UP / RESULT:   F/u type and screen   Follow post transfusion cbc

## 2025-07-14 NOTE — PROGRESS NOTE ADULT - ASSESSMENT
75 y/o male with pmhx HTN, HLD, Hyperkalemia, elevated PTH, lumbar radiculopathy, osteopenia, microalbuminuria, anaplastic thyroid carcinoma on oral chemotherapy (Dabrafenib and Trametinib) presented with intractable left hip pain for one week   In the ID:  CT showed  left iliopsoas intramuscular hematoma with small focus of active bleeding and small left hemoperitoneum  Admitted for Moderate to large left iliopsoas intramuscular hematoma  Small left hemoperitoneum  ER consulted  IR and Vascular 75 y/o male with pmhx HTN, HLD, Hyperkalemia, elevated PTH, lumbar radiculopathy, osteopenia, microalbuminuria, anaplastic thyroid carcinoma on oral chemotherapy (Dabrafenib and Trametinib) presented with intractable left hip pain for one week   In the ID:  CT showed  left iliopsoas intramuscular hematoma with small focus of active bleeding and small left hemoperitoneum  Admitted for Moderate to large left iliopsoas intramuscular hematoma  Small left hemoperitoneum  ER consulted  IR and Vascular, no urgent intervention at this time    Noted with Hg trended down this morning Hg 6.8  Transfussed one unit of PRBC   CT angio abd/pelvis performed to evaluate for further bleeding, pending results

## 2025-07-14 NOTE — PROGRESS NOTE ADULT - SUBJECTIVE AND OBJECTIVE BOX
HPI:  Patient is a 73 YO, from home, ambulates independently at baseline, with 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 Patient reports that pain started approximately one week ago and has progressively worsened. He is now unable to ambulate due to the pain. Patient denies headaches, fevers, chills, shortness of breath, chest pain, palpitations, nausea, vomiting, changes in urination or bowel movements.    ED Course  Vitals: /61 mmHg, HR 62 BPM, RR 16, SpO2 97% on RA, T 36.7   Labs: Hb 9.9, SCr 1.39  CT with moderate to large left iliopsoas intramuscular hematoma with small focus   of active bleeding. Small left hemoperitoneum.  S/p morphine, toradol (12 Jul 2025 22:40)     Pt is seen and examined  pt is awake and lying in bed/out of bed to chair  pt seems comfortable and denies any complaints at this time    ROS:  Negative except for:    MEDICATIONS  (STANDING):  amLODIPine   Tablet 10 milliGRAM(s) Oral daily  dabrafenib 150 milliGRAM(s) Oral <User Schedule>  losartan 50 milliGRAM(s) Oral daily  piperacillin/tazobactam IVPB.. 3.375 Gram(s) IV Intermittent every 8 hours  trametinib 2 milliGRAM(s) Oral <User Schedule>    MEDICATIONS  (PRN):  acetaminophen     Tablet .. 650 milliGRAM(s) Oral every 6 hours PRN Temp greater or equal to 38C (100.4F), Mild Pain (1 - 3)  aluminum hydroxide/magnesium hydroxide/simethicone Suspension 30 milliLiter(s) Oral every 4 hours PRN Dyspepsia  melatonin 3 milliGRAM(s) Oral at bedtime PRN Insomnia  morphine  - Injectable 2 milliGRAM(s) IV Push every 6 hours PRN Severe Pain (7 - 10)  morphine  - Injectable 1 milliGRAM(s) IV Push every 6 hours PRN Moderate Pain (4 - 6)      Allergies    No Known Allergies    Intolerances        Vital Signs Last 24 Hrs  T(C): 36.6 (14 Jul 2025 09:20), Max: 39.3 (13 Jul 2025 14:24)  T(F): 97.8 (14 Jul 2025 09:20), Max: 102.8 (13 Jul 2025 14:24)  HR: 72 (14 Jul 2025 09:20) (63 - 106)  BP: 88/51 (14 Jul 2025 09:20) (88/51 - 156/76)  BP(mean): 93 (14 Jul 2025 05:09) (79 - 93)  RR: 18 (14 Jul 2025 09:20) (16 - 18)  SpO2: 95% (14 Jul 2025 09:20) (95% - 96%)    Parameters below as of 14 Jul 2025 09:20  Patient On (Oxygen Delivery Method): room air        PHYSICAL EXAM  General: adult in NAD  HEENT: clear oropharynx, anicteric sclera, pink conjunctiva  Neck: supple  CV: normal S1/S2 with no murmur rubs or gallops  Lungs: positive air movement b/l ant lungs,clear to auscultation, no wheezes, no rales  Abdomen: soft non-tender non-distended, no hepatosplenomegaly  Ext: no clubbing cyanosis or edema  Skin: no rashes and no petechiae  Neuro: alert and oriented X 4, no focal deficits  LABS:                          6.8    7.97  )-----------( 163      ( 14 Jul 2025 05:50 )             20.6         Mean Cell Volume : 91.6 fl  Mean Cell Hemoglobin : 30.2 pg  Mean Cell Hemoglobin Concentration : 33.0 g/dL  Auto Neutrophil # : x  Auto Lymphocyte # : x  Auto Monocyte # : x  Auto Eosinophil # : x  Auto Basophil # : x  Auto Neutrophil % : x  Auto Lymphocyte % : x  Auto Monocyte % : x  Auto Eosinophil % : x  Auto Basophil % : x    Serial CBC  Hematocrit 20.6  Hemoglobin 6.8  Plat 163  RBC 2.25  WBC 7.97  Serial CBC  Hematocrit 25.8  Hemoglobin 8.6  Plat 216  RBC 2.85  WBC 6.80  Serial CBC  Hematocrit 27.6  Hemoglobin 9.1  Plat 233  RBC 3.01  WBC 7.80  Serial CBC  Hematocrit 25.8  Hemoglobin 8.4  Plat 217  RBC 2.86  WBC 7.10  Serial CBC  Hematocrit 26.4  Hemoglobin 8.7  Plat 227  RBC 2.90  WBC 8.16  Serial CBC  Hematocrit 27.8  Hemoglobin 9.2  Plat --  RBC --  WBC --  Serial CBC  Hematocrit 27.5  Hemoglobin 9.0  Plat 253  RBC 3.01  WBC 8.13  Serial CBC  Hematocrit 30.5  Hemoglobin 9.9  Plat 306  RBC 3.38  WBC 8.54    07-14    136  |  107  |  40[H]  ----------------------------<  139[H]  4.0   |  23  |  1.27    Ca    8.3[L]      14 Jul 2025 05:52  Phos  3.4     07-14  Mg     2.1     07-14    TPro  4.4[L]  /  Alb  2.2[L]  /  TBili  0.3  /  DBili  x   /  AST  115[H]  /  ALT  91[H]  /  AlkPhos  48  07-14      PT/INR - ( 13 Jul 2025 15:00 )   PT: 14.6 sec;   INR: 1.26 ratio         PTT - ( 13 Jul 2025 15:00 )  PTT:28.7 sec              BLOOD SMEAR INTERPRETATION:       RADIOLOGY & ADDITIONAL STUDIES:

## 2025-07-14 NOTE — PROGRESS NOTE ADULT - SUBJECTIVE AND OBJECTIVE BOX
NP Note discussed with  primary attending    Patient is a 74y old  Male who presents with a chief complaint of Left flank pain (12 Jul 2025 21:28)      INTERVAL HPI/OVERNIGHT EVENTS: no new complaints    MEDICATIONS  (STANDING):  amLODIPine   Tablet 10 milliGRAM(s) Oral daily  dabrafenib 150 milliGRAM(s) Oral <User Schedule>  losartan 50 milliGRAM(s) Oral daily  piperacillin/tazobactam IVPB.. 3.375 Gram(s) IV Intermittent every 8 hours  trametinib 2 milliGRAM(s) Oral <User Schedule>    MEDICATIONS  (PRN):  acetaminophen     Tablet .. 650 milliGRAM(s) Oral every 6 hours PRN Temp greater or equal to 38C (100.4F), Mild Pain (1 - 3)  aluminum hydroxide/magnesium hydroxide/simethicone Suspension 30 milliLiter(s) Oral every 4 hours PRN Dyspepsia  melatonin 3 milliGRAM(s) Oral at bedtime PRN Insomnia  morphine  - Injectable 2 milliGRAM(s) IV Push every 6 hours PRN Severe Pain (7 - 10)  morphine  - Injectable 1 milliGRAM(s) IV Push every 6 hours PRN Moderate Pain (4 - 6)      __________________________________________________  REVIEW OF SYSTEMS:    CONSTITUTIONAL: No fever,   EYES: no acute visual disturbances  NECK: No pain or stiffness  RESPIRATORY: No cough; No shortness of breath  CARDIOVASCULAR: No chest pain, no palpitations  GASTROINTESTINAL: No pain. No nausea or vomiting; No diarrhea   NEUROLOGICAL: No headache or numbness, no tremors  MUSCULOSKELETAL: No joint pain, no muscle pain  GENITOURINARY: no dysuria, no frequency, no hesitancy        Vital Signs Last 24 Hrs  T(C): 36.5 (14 Jul 2025 15:51), Max: 37.4 (13 Jul 2025 17:14)  T(F): 97.7 (14 Jul 2025 15:51), Max: 99.4 (13 Jul 2025 17:14)  HR: 68 (14 Jul 2025 13:28) (63 - 84)  BP: 148/70 (14 Jul 2025 13:28) (88/51 - 148/70)  BP(mean): 93 (14 Jul 2025 05:09) (79 - 93)  RR: 18 (14 Jul 2025 13:28) (16 - 18)  SpO2: 97% (14 Jul 2025 13:28) (95% - 97%)    Parameters below as of 14 Jul 2025 13:28  Patient On (Oxygen Delivery Method): room air        ________________________________________________  PHYSICAL EXAM:  GENERAL: NAD  HEENT: Normocephalic;  conjunctivae and sclerae clear; moist mucous membranes;   NECK : supple  CHEST/LUNG: Clear to ausculitation bilaterally with good air entry   HEART: S1 S2  regular; no murmurs, gallops or rubs  ABDOMEN: Soft, Nontender, Nondistended; Bowel sounds present  EXTREMITIES: no cyanosis; no edema; no calf tenderness  SKIN: warm and dry; no rash  NERVOUS SYSTEM:  Awake and alert; Oriented  to place, person and time ; no new deficits    _________________________________________________  LABS:                        6.8    7.97  )-----------( 163      ( 14 Jul 2025 05:50 )             20.6     07-14    136  |  107  |  40[H]  ----------------------------<  139[H]  4.0   |  23  |  1.27    Ca    8.3[L]      14 Jul 2025 05:52  Phos  3.4     07-14  Mg     2.1     07-14    TPro  4.4[L]  /  Alb  2.2[L]  /  TBili  0.3  /  DBili  x   /  AST  115[H]  /  ALT  91[H]  /  AlkPhos  48  07-14    PT/INR - ( 13 Jul 2025 15:00 )   PT: 14.6 sec;   INR: 1.26 ratio         PTT - ( 13 Jul 2025 15:00 )  PTT:28.7 sec  Urinalysis Basic - ( 14 Jul 2025 05:52 )    Color: x / Appearance: x / SG: x / pH: x  Gluc: 139 mg/dL / Ketone: x  / Bili: x / Urobili: x   Blood: x / Protein: x / Nitrite: x   Leuk Esterase: x / RBC: x / WBC x   Sq Epi: x / Non Sq Epi: x / Bacteria: x      CAPILLARY BLOOD GLUCOSE            RADIOLOGY & ADDITIONAL TESTS:    Imaging Personally Reviewed:  YES/NO    Consultant(s) Notes Reviewed:   YES/ No    Care Discussed with Consultants :     Plan of care was discussed with patient and /or primary care giver; all questions and concerns were addressed and care was aligned with patient's wishes.     NP Note discussed with  primary attending    Patient is a 74y old  Male who presents with a chief complaint of Left flank pain (12 Jul 2025 21:28)      INTERVAL HPI/OVERNIGHT EVENTS: no new complaints    MEDICATIONS  (STANDING):  amLODIPine   Tablet 10 milliGRAM(s) Oral daily  dabrafenib 150 milliGRAM(s) Oral <User Schedule>  losartan 50 milliGRAM(s) Oral daily  piperacillin/tazobactam IVPB.. 3.375 Gram(s) IV Intermittent every 8 hours  trametinib 2 milliGRAM(s) Oral <User Schedule>    MEDICATIONS  (PRN):  acetaminophen     Tablet .. 650 milliGRAM(s) Oral every 6 hours PRN Temp greater or equal to 38C (100.4F), Mild Pain (1 - 3)  aluminum hydroxide/magnesium hydroxide/simethicone Suspension 30 milliLiter(s) Oral every 4 hours PRN Dyspepsia  melatonin 3 milliGRAM(s) Oral at bedtime PRN Insomnia  morphine  - Injectable 2 milliGRAM(s) IV Push every 6 hours PRN Severe Pain (7 - 10)  morphine  - Injectable 1 milliGRAM(s) IV Push every 6 hours PRN Moderate Pain (4 - 6)      __________________________________________________  REVIEW OF SYSTEMS:    CONSTITUTIONAL: No fever,   RESPIRATORY: No cough; No shortness of breath  CARDIOVASCULAR: No chest pain, no palpitations  GASTROINTESTINAL: No pain. No nausea or vomiting; No diarrhea   NEUROLOGICAL: No headache or numbness, no tremors  MUSCULOSKELETAL: left hip pain, difficulties moving hir left leg   GENITOURINARY: no dysuria, no frequency, no hesitancy        Vital Signs Last 24 Hrs  T(C): 36.5 (14 Jul 2025 15:51), Max: 37.4 (13 Jul 2025 17:14)  T(F): 97.7 (14 Jul 2025 15:51), Max: 99.4 (13 Jul 2025 17:14)  HR: 68 (14 Jul 2025 13:28) (63 - 84)  BP: 148/70 (14 Jul 2025 13:28) (88/51 - 148/70)  BP(mean): 93 (14 Jul 2025 05:09) (79 - 93)  RR: 18 (14 Jul 2025 13:28) (16 - 18)  SpO2: 97% (14 Jul 2025 13:28) (95% - 97%)    Parameters below as of 14 Jul 2025 13:28  Patient On (Oxygen Delivery Method): room air        ________________________________________________  PHYSICAL EXAM:  GENERAL: NAD  HEENT: Normocephalic;  conjunctivae and sclerae clear; moist mucous membranes;   NECK : supple  CHEST/LUNG: Clear to ausculitation bilaterally with good air entry   HEART: S1 S2  regular; no murmurs, gallops or rubs  ABDOMEN: Soft, Nontender, Nondistended; Bowel sounds present  EXTREMITIES: decreased ROM to left leg/hip due to pain   SKIN: warm and dry; no rash  NERVOUS SYSTEM:  Awake and alert; Oriented  to place, person and time ; no new deficits    _________________________________________________  LABS:                        6.8    7.97  )-----------( 163      ( 14 Jul 2025 05:50 )             20.6     07-14    136  |  107  |  40[H]  ----------------------------<  139[H]  4.0   |  23  |  1.27    Ca    8.3[L]      14 Jul 2025 05:52  Phos  3.4     07-14  Mg     2.1     07-14    TPro  4.4[L]  /  Alb  2.2[L]  /  TBili  0.3  /  DBili  x   /  AST  115[H]  /  ALT  91[H]  /  AlkPhos  48  07-14    PT/INR - ( 13 Jul 2025 15:00 )   PT: 14.6 sec;   INR: 1.26 ratio         PTT - ( 13 Jul 2025 15:00 )  PTT:28.7 sec  Urinalysis Basic - ( 14 Jul 2025 05:52 )    Color: x / Appearance: x / SG: x / pH: x  Gluc: 139 mg/dL / Ketone: x  / Bili: x / Urobili: x   Blood: x / Protein: x / Nitrite: x   Leuk Esterase: x / RBC: x / WBC x   Sq Epi: x / Non Sq Epi: x / Bacteria: x      CAPILLARY BLOOD GLUCOSE            RADIOLOGY & ADDITIONAL TESTS:    Imaging Personally Reviewed:  YES/NO    Consultant(s) Notes Reviewed:   YES/ No    Care Discussed with Consultants :     Plan of care was discussed with patient and /or primary care giver; all questions and concerns were addressed and care was aligned with patient's wishes.

## 2025-07-14 NOTE — PROGRESS NOTE ADULT - NS ATTEND AMEND GEN_ALL_CORE FT
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  The patient states the pain is mild when he doesn't move his leg, but becomes severe when he tries to move his leg or ambulate. He has been unable to get out of bed or ambulate due to the pain. He has weakness in his legs with right leg weaker than left but he states his inability to ambulate is due to pain, not weakness. He ambulates with a cane at baseline. In the ER CT with left iliopsoas intramuscular hematoma with small focus of active bleeding and small left hemoperitoneum. Patient denies any recent falls or injuries to his hip or leg. He denies this ever happening to him in the past. He states his hip surgeries were from years ago and are not recent. His last Lovenox dose was at 7:30am. ER discussed case with IR and Vascular who recommended to monitor Hgb/Hct and recommended against protamine sulfate as last Lovenox dose was greater than 12 hours ago.      Vital Signs Last 24 Hrs  T(C): 36.5 (14 Jul 2025 15:51), Max: 37.2 (14 Jul 2025 05:09)  T(F): 97.7 (14 Jul 2025 15:51), Max: 99 (14 Jul 2025 05:09)  HR: 68 (14 Jul 2025 13:28) (68 - 84)  BP: 148/70 (14 Jul 2025 13:28) (88/51 - 148/70)  BP(mean): 93 (14 Jul 2025 05:09) (79 - 93)  RR: 18 (14 Jul 2025 13:28) (18 - 18)  SpO2: 97% (14 Jul 2025 13:28) (95% - 97%): room air      Physical Exam  General: Awake, Alert, Oriented  Cardiac: RRR  Pulmonary: CTA b/l  Abdominal: Soft, ND, NT  Extremities: No edema b/l, No bruising on thighs noted. + left hip with swelling    Labs:                        8.5    7.54  )-----------( 152      ( 14 Jul 2025 17:19 )             24.5   07-14  136  |  107  |  40[H]  ----------------------------<  139[H]  4.0   |  23  |  1.27    Ca    8.3[L]      14 Jul 2025 05:52  Phos  3.4     07-14  Mg     2.1     07-14  TPro  4.4[L]  /  Alb  2.2[L]  /  TBili  0.3  /  DBili  x   /  AST  115[H]  /  ALT  91[H]  /  AlkPhos  48  07-14    < from: CT Abdomen and Pelvis w/ IV Cont (07.14.25 @ 14:34) >    IMPRESSION:    No change in moderate to large left iliopsoas intramuscular hematoma. No contrast extravasation identified at this time.  Right gluteus hematoma is slightly smaller than prior.  Trace ascites overall unchanged (slightly increased on the right and slightly decreased on the left).  Developing rectal fecal impaction with distention to 8.0 cm.  Undescended right testicle. Outpatient follow-up advised.  Large medial thigh lipoma. Recommend nonemergent MRI follow-up.      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 (Dabrafenib and Trametinib)    Plan:  Sepsis work up; Blood and Urine Cx; Lactate WNL;   May reduce IV Fluids  Type and Cross one unit PRBC given this morning with adequate response  May give empirical antibiotics after Cx drawn  CT Angio done with no actyive extravasation of contrast sggesting no active bleeed; also adequate response to one unit  On admission ER discussed with IR and Vascular; advised observation   Surgery follow up requested;   PT eval  Monitor Hgb Q 12 hrs; and, transfuse for Hgb < 7;   SCDs due to hematoma; resume home Lovenox BID when  H/H stable and cleared by Vascular  Tylenol prn for mild pain, IV Morphine 1mg prn for moderate pain, IV Morphine 2mg prn for severe pain  Patient aware that he has an undescended right testicle; - Outpatient follow up  Continue home medications Amlodipine, Losartan,   Resume Dabrafenib, Trametinib once family brings as Non formulary chemo meds  Hematology follow up  I left message for Denilson through his  Hailey  DVT PPx: SCDs due to hematoma; resume home Lovenox BID when appropriate  Kosher Diet  Monitor and replete electrolytes as needed    Discussed with Patient findings and plan of care  Discussed with PGY2 Dr. 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  The patient states the pain is mild when he doesn't move his leg, but becomes severe when he tries to move his leg or ambulate. He has been unable to get out of bed or ambulate due to the pain. He has weakness in his legs with right leg weaker than left but he states his inability to ambulate is due to pain, not weakness. He ambulates with a cane at baseline. In the ER CT with left iliopsoas intramuscular hematoma with small focus of active bleeding and small left hemoperitoneum. Patient denies any recent falls or injuries to his hip or leg. He denies this ever happening to him in the past.  His last Lovenox dose was at 7:30am. ER discussed case with IR and Vascular who recommended to monitor Hgb/Hct and recommended against protamine sulfate as last Lovenox dose was greater than 12 hours ago.    Patient remain stable. Pain controlled; slight drop in H/H. one unit PRBC transfused. BP/ HR stable. Vascular surgery follow up requested  Patient follows up with Dr. Oreilly at [facility].     Vital Signs Last 24 Hrs  T(C): 36.5 (14 Jul 2025 15:51), Max: 37.2 (14 Jul 2025 05:09)  T(F): 97.7 (14 Jul 2025 15:51), Max: 99 (14 Jul 2025 05:09)  HR: 68 (14 Jul 2025 13:28) (68 - 84)  BP: 148/70 (14 Jul 2025 13:28) (88/51 - 148/70)  BP(mean): 93 (14 Jul 2025 05:09) (79 - 93)  RR: 18 (14 Jul 2025 13:28) (18 - 18)  SpO2: 97% (14 Jul 2025 13:28) (95% - 97%): room air    P/E:  General: Awake, Alert, Oriented  Cardiac: S1S2 present, regular  Pulmonary: BLAE+, No wheeze or Rhonchi  Abdominal: Soft, ND, NT  Extremities: No edema b/l, No bruising on thighs noted. + left hip with swelling    Labs: low Hgb 6.8 after PRBC 8.5gm                        8.5    7.54  )-----------( 152      ( 14 Jul 2025 17:19 )             24.5   07-14  136  |  107  |  40[H]  ----------------------------<  139[H]  4.0   |  23  |  1.27    Ca    8.3[L]      14 Jul 2025 05:52  Phos  3.4     07-14  Mg     2.1     07-14  TPro  4.4[L]  /  Alb  2.2[L]  /  TBili  0.3  /  DBili  x   /  AST  115[H]  /  ALT  91[H]  /  AlkPhos  48  07-14    CT Abdomen and Pelvis w/ IV Cont (07.14.25 @ 14:34)     IMPRESSION:  No change in moderate to large left iliopsoas intramuscular hematoma. No contrast extravasation identified at this time.  Right gluteus hematoma is slightly smaller than prior.  Trace ascites overall unchanged (slightly increased on the right and slightly decreased on the left).  Developing rectal fecal impaction with distention to 8.0 cm.  Undescended right testicle. Outpatient follow-up advised.  Large medial thigh lipoma. Recommend nonemergent MRI follow-up.    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 (Dabrafenib and Trametinib)    Plan:  Sepsis work up; Blood and Urine Cx; Lactate WNL;   May reduce IV Fluids  Type and Crossed one unit PRBC given this morning with adequate response  May give empirical antibiotics after Cx drawn  CT Angio done with no active extravasation of contrast suggesting no ongoing active bleed; also adequate response to one unit  On admission ER discussed with IR and Vascular; advised observation; Surgery follow up requested;   PT eval if remain stable  Monitor Hgb Q 12 hrs; and, transfuse for Hgb < 7;   SCDs due to hematoma; resume home Lovenox BID when  H/H stable and cleared by Vascular  Tylenol prn for mild pain, IV Morphine 1mg prn for moderate pain, IV Morphine 2mg prn for severe pain  Patient aware that he has an undescended right testicle; - Outpatient follow up  Continue home medications Amlodipine, Losartan,   Resumed Dabrafenib, Trametinib; Non formulary chemo meds; family brought in  Hematology follow up  I left message for Denilson through his  Hailey as well as family had left message  DVT PPx: SCDs due to hematoma;   Kosher Diet  Monitor and replete electrolytes as needed    Discussed with Patient and family at bedside findings and plan of care  Discussed with JORDY Longoria and RN  I will be away 7/15/25 onwards; Hospitalist Colleague to assume care AM; will update incoming Hospitalist to f/u Dr. Oreilly office

## 2025-07-15 ENCOUNTER — APPOINTMENT (OUTPATIENT)
Dept: HEMATOLOGY ONCOLOGY | Facility: CLINIC | Age: 75
End: 2025-07-15

## 2025-07-15 LAB
ANION GAP SERPL CALC-SCNC: 8 MMOL/L — SIGNIFICANT CHANGE UP (ref 5–17)
BUN SERPL-MCNC: 35 MG/DL — HIGH (ref 7–18)
CALCIUM SERPL-MCNC: 8.1 MG/DL — LOW (ref 8.4–10.5)
CHLORIDE SERPL-SCNC: 106 MMOL/L — SIGNIFICANT CHANGE UP (ref 96–108)
CO2 SERPL-SCNC: 23 MMOL/L — SIGNIFICANT CHANGE UP (ref 22–31)
CREAT SERPL-MCNC: 1.24 MG/DL — SIGNIFICANT CHANGE UP (ref 0.5–1.3)
EGFR: 61 ML/MIN/1.73M2 — SIGNIFICANT CHANGE UP
EGFR: 61 ML/MIN/1.73M2 — SIGNIFICANT CHANGE UP
GLUCOSE SERPL-MCNC: 121 MG/DL — HIGH (ref 70–99)
HCT VFR BLD CALC: 23.8 % — LOW (ref 39–50)
HGB BLD-MCNC: 8 G/DL — LOW (ref 13–17)
MCHC RBC-ENTMCNC: 29.7 PG — SIGNIFICANT CHANGE UP (ref 27–34)
MCHC RBC-ENTMCNC: 33.6 G/DL — SIGNIFICANT CHANGE UP (ref 32–36)
MCV RBC AUTO: 88.5 FL — SIGNIFICANT CHANGE UP (ref 80–100)
NRBC BLD AUTO-RTO: 0 /100 WBCS — SIGNIFICANT CHANGE UP (ref 0–0)
PLATELET # BLD AUTO: 124 K/UL — LOW (ref 150–400)
POTASSIUM SERPL-MCNC: 3.5 MMOL/L — SIGNIFICANT CHANGE UP (ref 3.5–5.3)
POTASSIUM SERPL-SCNC: 3.5 MMOL/L — SIGNIFICANT CHANGE UP (ref 3.5–5.3)
RBC # BLD: 2.69 M/UL — LOW (ref 4.2–5.8)
RBC # FLD: 14 % — SIGNIFICANT CHANGE UP (ref 10.3–14.5)
SODIUM SERPL-SCNC: 137 MMOL/L — SIGNIFICANT CHANGE UP (ref 135–145)
WBC # BLD: 5.89 K/UL — SIGNIFICANT CHANGE UP (ref 3.8–10.5)
WBC # FLD AUTO: 5.89 K/UL — SIGNIFICANT CHANGE UP (ref 3.8–10.5)

## 2025-07-15 PROCEDURE — 82746 ASSAY OF FOLIC ACID SERUM: CPT

## 2025-07-15 PROCEDURE — 80053 COMPREHEN METABOLIC PANEL: CPT

## 2025-07-15 PROCEDURE — 36430 TRANSFUSION BLD/BLD COMPNT: CPT

## 2025-07-15 PROCEDURE — 83540 ASSAY OF IRON: CPT

## 2025-07-15 PROCEDURE — 73552 X-RAY EXAM OF FEMUR 2/>: CPT

## 2025-07-15 PROCEDURE — 87040 BLOOD CULTURE FOR BACTERIA: CPT

## 2025-07-15 PROCEDURE — 73700 CT LOWER EXTREMITY W/O DYE: CPT

## 2025-07-15 PROCEDURE — 85014 HEMATOCRIT: CPT

## 2025-07-15 PROCEDURE — 80048 BASIC METABOLIC PNL TOTAL CA: CPT

## 2025-07-15 PROCEDURE — 87637 SARSCOV2&INF A&B&RSV AMP PRB: CPT

## 2025-07-15 PROCEDURE — 84443 ASSAY THYROID STIM HORMONE: CPT

## 2025-07-15 PROCEDURE — 72192 CT PELVIS W/O DYE: CPT

## 2025-07-15 PROCEDURE — 86900 BLOOD TYPING SEROLOGIC ABO: CPT

## 2025-07-15 PROCEDURE — 85027 COMPLETE CBC AUTOMATED: CPT

## 2025-07-15 PROCEDURE — 84439 ASSAY OF FREE THYROXINE: CPT

## 2025-07-15 PROCEDURE — 86850 RBC ANTIBODY SCREEN: CPT

## 2025-07-15 PROCEDURE — 84100 ASSAY OF PHOSPHORUS: CPT

## 2025-07-15 PROCEDURE — 86901 BLOOD TYPING SEROLOGIC RH(D): CPT

## 2025-07-15 PROCEDURE — 71045 X-RAY EXAM CHEST 1 VIEW: CPT

## 2025-07-15 PROCEDURE — 83605 ASSAY OF LACTIC ACID: CPT

## 2025-07-15 PROCEDURE — 36415 COLL VENOUS BLD VENIPUNCTURE: CPT

## 2025-07-15 PROCEDURE — 74177 CT ABD & PELVIS W/CONTRAST: CPT

## 2025-07-15 PROCEDURE — 82607 VITAMIN B-12: CPT

## 2025-07-15 PROCEDURE — 93971 EXTREMITY STUDY: CPT

## 2025-07-15 PROCEDURE — 74178 CT ABD&PLV WO CNTR FLWD CNTR: CPT

## 2025-07-15 PROCEDURE — 86923 COMPATIBILITY TEST ELECTRIC: CPT

## 2025-07-15 PROCEDURE — 73522 X-RAY EXAM HIPS BI 3-4 VIEWS: CPT

## 2025-07-15 PROCEDURE — 84481 FREE ASSAY (FT-3): CPT

## 2025-07-15 PROCEDURE — 85018 HEMOGLOBIN: CPT

## 2025-07-15 PROCEDURE — 85610 PROTHROMBIN TIME: CPT

## 2025-07-15 PROCEDURE — P9040: CPT

## 2025-07-15 PROCEDURE — 83735 ASSAY OF MAGNESIUM: CPT

## 2025-07-15 PROCEDURE — 99233 SBSQ HOSP IP/OBS HIGH 50: CPT | Mod: FS

## 2025-07-15 PROCEDURE — 81001 URINALYSIS AUTO W/SCOPE: CPT

## 2025-07-15 PROCEDURE — 85730 THROMBOPLASTIN TIME PARTIAL: CPT

## 2025-07-15 PROCEDURE — 85025 COMPLETE CBC W/AUTO DIFF WBC: CPT

## 2025-07-15 PROCEDURE — 76376 3D RENDER W/INTRP POSTPROCES: CPT

## 2025-07-15 RX ORDER — LOSARTAN POTASSIUM 100 MG/1
1 TABLET, FILM COATED ORAL
Refills: 0 | DISCHARGE

## 2025-07-15 RX ADMIN — TRAMETINIB 2 MILLIGRAM(S): 0.05 POWDER, FOR SOLUTION ORAL at 20:03

## 2025-07-15 RX ADMIN — DABRAFENIB 150 MILLIGRAM(S): 50 CAPSULE ORAL at 20:03

## 2025-07-15 RX ADMIN — POLYETHYLENE GLYCOL 3350 17 GRAM(S): 17 POWDER, FOR SOLUTION ORAL at 13:18

## 2025-07-15 RX ADMIN — AMLODIPINE BESYLATE 10 MILLIGRAM(S): 10 TABLET ORAL at 05:32

## 2025-07-15 RX ADMIN — Medication 25 GRAM(S): at 13:17

## 2025-07-15 RX ADMIN — LOSARTAN POTASSIUM 50 MILLIGRAM(S): 100 TABLET, FILM COATED ORAL at 05:31

## 2025-07-15 RX ADMIN — Medication 25 GRAM(S): at 05:33

## 2025-07-15 RX ADMIN — Medication 2 TABLET(S): at 21:04

## 2025-07-15 RX ADMIN — DABRAFENIB 150 MILLIGRAM(S): 50 CAPSULE ORAL at 10:06

## 2025-07-15 NOTE — DIETITIAN INITIAL EVALUATION ADULT - FACTORS AFF FOOD INTAKE
Chief Complaint   Patient presents with   • Tick Bite     right side hip tick bite        SUBJECTIVE:    Patient is 40 years old and removed tick from his waist.  He did bring the tick in.  It is a deer tick.  Take may have been imbedded in his skin for greater than 36 hours.  Patient has already had Lyme disease in the past and was very ill.  He is requesting 3 weeks of doxycycline at this time.      Social History     Socioeconomic History   • Marital status: Single     Spouse name: Not on file   • Number of children: Not on file   • Years of education: Not on file   • Highest education level: Not on file   Occupational History   • Occupation: tool  dye     Employer: [employer]   Tobacco Use   • Smoking status: Never Smoker   • Smokeless tobacco: Never Used   Substance and Sexual Activity   • Alcohol use: Yes     Alcohol/week: 2.0 standard drinks     Types: 2 Cans of beer per week     Comment: Occasionally    • Drug use: No   • Sexual activity: Yes     Partners: Female     Birth control/protection: Surgical   Other Topics Concern   • Not on file   Social History Narrative   • Not on file     Social Determinants of Health     Financial Resource Strain:    • Social Determinants: Financial Resource Strain:    Food Insecurity:    • Social Determinants: Food Insecurity:    Transportation Needs:    • Lack of Transportation (Medical):    • Lack of Transportation (Non-Medical):    Physical Activity:    • Days of Exercise per Week:    • Minutes of Exercise per Session:    Stress:    • Social Determinants: Stress:    Social Connections:    • Social Determinants: Social Connections:    Intimate Partner Violence:    • Social Determinants: Intimate Partner Violence Past Fear:    • Social Determinants: Intimate Partner Violence Current Fear:        Current Outpatient Medications   Medication Sig Dispense Refill   • meloxicam (MOBIC) 15 MG tablet TAKE 1 TABLET BY MOUTH DAILY 30 tablet 0   • albuterol (PROAIR HFA) 108  (90 Base) MCG/ACT inhaler Inhale 2 puffs into the lungs every 4 hours as needed for Shortness of Breath or Wheezing. 8.5 g 3   • doxycycline monohydrate (MONODOX) 100 MG capsule Take 1 capsule by mouth 2 times daily. 28 capsule 1   • fluticasone (FLOVENT HFA) 44 MCG/ACT inhaler Inhale 2 puffs into the lungs 2 times daily. 1 Inhaler 3   • doxycycline hyclate (VIBRAMYCIN) 100 MG capsule Take 1 capsule by mouth 2 times daily for 21 days. 42 capsule 0     No current facility-administered medications for this visit.       ALLERGIES:  No Known Allergies    Past Medical History:   Diagnosis Date   • Acne    • Asthma    • Broken wrist    • Varicella without mention of complication     Chicken Pox             OBJECTIVE:  Visit Vitals  BP (!) 156/98 (BP Location: RUE - Right upper extremity, Patient Position: Sitting, Cuff Size: Regular)   Pulse 77   Temp 97.1 °F (36.2 °C) (Temporal)   SpO2 99%     Tiny erythematous macular area hip where tick bite occurred.  No bull's-eye rash.    Assessment     Tick bite, initial encounter  (primary encounter diagnosis)      Plan     Patient is given doxycycline 100 mg twice daily for 3 weeks.  If any problems or concerns follow-up.   Gnosticist/ethnic/cultural/personal food preferences

## 2025-07-15 NOTE — DIETITIAN INITIAL EVALUATION ADULT - ORAL INTAKE PTA/DIET HISTORY
Pt reported no new food allergies or intolerances. Pt did not take any vitamins or supplements PTA. Pt's intake was decreased x 2 months PTA. Reported UBW: 137.5lbs x 2 months ago, noted with 9.5lb weight loss x 2 months.  No recent episodes of nausea, vomiting, diarrhea or constipation per pt. Last BM noted PTA per pt, doesn't remember the day. Denies any chewing/swallowing difficulties with current diet consistency. Intake is % per pt. Food preferences explored and forwarded to dietary. Hypocalcemia (8.1).

## 2025-07-15 NOTE — DIETITIAN INITIAL EVALUATION ADULT - ADD RECOMMEND
1) Continue current diet as medically feasible.  2) Encourage PO intake and honor food preferences as able.  3) Monitor PO intake, labs, weights, BM's, and skin integrity.  4) Recommend glucerna BID

## 2025-07-15 NOTE — DIETITIAN INITIAL EVALUATION ADULT - PERTINENT LABORATORY DATA
07-15    137  |  106  |  35[H]  ----------------------------<  121[H]  3.5   |  23  |  1.24    Ca    8.1[L]      15 Jul 2025 06:25  Phos  3.4     07-14  Mg     2.1     07-14    TPro  4.4[L]  /  Alb  2.2[L]  /  TBili  0.3  /  DBili  x   /  AST  115[H]  /  ALT  91[H]  /  AlkPhos  48  07-14

## 2025-07-15 NOTE — DIETITIAN INITIAL EVALUATION ADULT - NS FNS DIET ORDER
Diet, Regular:   DASH/TLC {Sodium & Cholesterol Restricted}  Kelli (07-13-25 @ 00:31) [Active]

## 2025-07-15 NOTE — DIETITIAN INITIAL EVALUATION ADULT - PERTINENT MEDS FT
MEDICATIONS  (STANDING):  amLODIPine   Tablet 10 milliGRAM(s) Oral daily  dabrafenib 150 milliGRAM(s) Oral <User Schedule>  losartan 50 milliGRAM(s) Oral daily  piperacillin/tazobactam IVPB.. 3.375 Gram(s) IV Intermittent every 8 hours  polyethylene glycol 3350 17 Gram(s) Oral daily  senna 2 Tablet(s) Oral at bedtime  trametinib 2 milliGRAM(s) Oral <User Schedule>    MEDICATIONS  (PRN):  acetaminophen     Tablet .. 650 milliGRAM(s) Oral every 6 hours PRN Temp greater or equal to 38C (100.4F), Mild Pain (1 - 3)  aluminum hydroxide/magnesium hydroxide/simethicone Suspension 30 milliLiter(s) Oral every 4 hours PRN Dyspepsia  melatonin 3 milliGRAM(s) Oral at bedtime PRN Insomnia  morphine  - Injectable 2 milliGRAM(s) IV Push every 6 hours PRN Severe Pain (7 - 10)  morphine  - Injectable 1 milliGRAM(s) IV Push every 6 hours PRN Moderate Pain (4 - 6)

## 2025-07-15 NOTE — PROGRESS NOTE ADULT - ASSESSMENT
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 No   contrast extravasation identified at this time.

## 2025-07-15 NOTE — PROGRESS NOTE ADULT - SUBJECTIVE AND OBJECTIVE BOX
Patient is a 74y old  Male who presents with a chief complaint of Ambulatory dysfunction due to left hip pain noted with Iliopsoas hematoma (14 Jul 2025 16:18)    Subjective:  Patient seen at bedside.  Patient is feeling well.  No overnight events    MEDICATIONS  (STANDING):  amLODIPine   Tablet 10 milliGRAM(s) Oral daily  dabrafenib 150 milliGRAM(s) Oral <User Schedule>  losartan 50 milliGRAM(s) Oral daily  piperacillin/tazobactam IVPB.. 3.375 Gram(s) IV Intermittent every 8 hours  polyethylene glycol 3350 17 Gram(s) Oral daily  senna 2 Tablet(s) Oral at bedtime  trametinib 2 milliGRAM(s) Oral <User Schedule>    MEDICATIONS  (PRN):  acetaminophen     Tablet .. 650 milliGRAM(s) Oral every 6 hours PRN Temp greater or equal to 38C (100.4F), Mild Pain (1 - 3)  aluminum hydroxide/magnesium hydroxide/simethicone Suspension 30 milliLiter(s) Oral every 4 hours PRN Dyspepsia  melatonin 3 milliGRAM(s) Oral at bedtime PRN Insomnia  morphine  - Injectable 2 milliGRAM(s) IV Push every 6 hours PRN Severe Pain (7 - 10)  morphine  - Injectable 1 milliGRAM(s) IV Push every 6 hours PRN Moderate Pain (4 - 6)      ROS  No fever, sweats, chills  No epistaxis, HA, sore throat  No CP, SOB, cough, sputum  No n/v/d, abd pain, melena, hematochezia  No edema  No rash  No anxiety  No back pain, joint pain  No bleeding, bruising  No dysuria, hematuria    Vital Signs Last 24 Hrs  T(C): 36.8 (15 Jul 2025 04:56), Max: 36.8 (14 Jul 2025 13:28)  T(F): 98.2 (15 Jul 2025 04:56), Max: 98.3 (14 Jul 2025 13:28)  HR: 78 (15 Jul 2025 04:56) (68 - 78)  BP: 144/73 (15 Jul 2025 04:56) (123/72 - 148/70)  BP(mean): 87 (14 Jul 2025 20:30) (87 - 87)  RR: 18 (15 Jul 2025 04:56) (18 - 18)  SpO2: 96% (15 Jul 2025 04:56) (96% - 97%)    Parameters below as of 15 Jul 2025 04:56  Patient On (Oxygen Delivery Method): room air        PE  NAD  Awake, alert  Anicteric, MMM  RRR  CTAB  Abd soft, NT, ND  No c/c/e  No rash grossly  FROM                          8.0    5.89  )-----------( 124      ( 15 Jul 2025 06:25 )             23.8       07-15    137  |  106  |  35[H]  ----------------------------<  121[H]  3.5   |  23  |  1.24    Ca    8.1[L]      15 Jul 2025 06:25  Phos  3.4     07-14  Mg     2.1     07-14    TPro  4.4[L]  /  Alb  2.2[L]  /  TBili  0.3  /  DBili  x   /  AST  115[H]  /  ALT  91[H]  /  AlkPhos  48  07-14

## 2025-07-15 NOTE — PROGRESS NOTE ADULT - NS ATTEND AMEND GEN_ALL_CORE FT
74M PMH thyroid cancer on PO chemo, R IJ thrombus on lovenox, HTN, HLD, Hyperkalemia, Elevated PTH, Lumbar Radiculopathy p/w cc L hip pain and ambulatory dysfunction, found with iliopsoas hematoma.    AP:  iliopsoas hematoma  thyroid cancer on PO chemo  R IJ thrombus on lovenox, now held  HTN  HLD  Hyperkalemia  Elevated PTH    -s/p 1u RBC with appropriate response    -CT Angio done Monday; no extravasation of contrast    -no plan for embolization by vasc surg  -initially started on zosyn for fever 7/13    -zosyn was continued for concern for hematoma infection, however pt no longer has pain in the area, WBC wnl, BCx NGTD    -discontinued zosyn, monitor off abx    -suspect fever from hematoma vs thrombus vs cancer  -discussed with heme onc Dr. Miguel    -hold AC until follow up with [facility]    -reschedule o/p PET scan  -OOBC daily  -PT eval  -resume home meds  -dvt ppx held due to hematoma, hemoperitoneum

## 2025-07-15 NOTE — PROGRESS NOTE ADULT - SUBJECTIVE AND OBJECTIVE BOX
NP Note discussed with  primary attending    Patient is a 74y old  Male who presents with a chief complaint of Ambulatory dysfunction due to left hip pain noted with Iliopsoas hematoma (15 Jul 2025 11:00)      INTERVAL HPI/OVERNIGHT EVENTS: no new complaints    MEDICATIONS  (STANDING):  amLODIPine   Tablet 10 milliGRAM(s) Oral daily  dabrafenib 150 milliGRAM(s) Oral <User Schedule>  losartan 50 milliGRAM(s) Oral daily  piperacillin/tazobactam IVPB.. 3.375 Gram(s) IV Intermittent every 8 hours  polyethylene glycol 3350 17 Gram(s) Oral daily  senna 2 Tablet(s) Oral at bedtime  trametinib 2 milliGRAM(s) Oral <User Schedule>    MEDICATIONS  (PRN):  acetaminophen     Tablet .. 650 milliGRAM(s) Oral every 6 hours PRN Temp greater or equal to 38C (100.4F), Mild Pain (1 - 3)  aluminum hydroxide/magnesium hydroxide/simethicone Suspension 30 milliLiter(s) Oral every 4 hours PRN Dyspepsia  melatonin 3 milliGRAM(s) Oral at bedtime PRN Insomnia  morphine  - Injectable 2 milliGRAM(s) IV Push every 6 hours PRN Severe Pain (7 - 10)  morphine  - Injectable 1 milliGRAM(s) IV Push every 6 hours PRN Moderate Pain (4 - 6)      __________________________________________________  REVIEW OF SYSTEMS:    CONSTITUTIONAL: No fever,   RESPIRATORY: No cough; No shortness of breath  CARDIOVASCULAR: No chest pain, no palpitations  GASTROINTESTINAL: No pain. No nausea or vomiting; No diarrhea   NEUROLOGICAL: No headache or numbness, no tremors  MUSCULOSKELETAL: left hip pain   GENITOURINARY: no dysuria,         Vital Signs Last 24 Hrs  T(C): 36.8 (15 Jul 2025 04:56), Max: 36.8 (15 Jul 2025 04:56)  T(F): 98.2 (15 Jul 2025 04:56), Max: 98.2 (15 Jul 2025 04:56)  HR: 78 (15 Jul 2025 04:56) (78 - 78)  BP: 144/73 (15 Jul 2025 04:56) (123/72 - 144/73)  BP(mean): 87 (14 Jul 2025 20:30) (87 - 87)  RR: 18 (15 Jul 2025 04:56) (18 - 18)  SpO2: 96% (15 Jul 2025 04:56) (96% - 96%)    Parameters below as of 15 Jul 2025 04:56  Patient On (Oxygen Delivery Method): room air        ________________________________________________  PHYSICAL EXAM:  GENERAL: NAD  CHEST/LUNG: Clear to ausculitation   HEART: S1 S2  regular;   ABDOMEN: Soft, Nontender, Nondistended; Bowel sounds present  EXTREMITIES: decreased ROM left LE due to pain   SKIN: warm and dry; no rash  NERVOUS SYSTEM:  Awake and alert; Oriented  to place, person and time ; no new deficits    _________________________________________________  LABS:                        8.0    5.89  )-----------( 124      ( 15 Jul 2025 06:25 )             23.8     07-15    137  |  106  |  35[H]  ----------------------------<  121[H]  3.5   |  23  |  1.24    Ca    8.1[L]      15 Jul 2025 06:25  Phos  3.4     07-14  Mg     2.1     07-14    TPro  4.4[L]  /  Alb  2.2[L]  /  TBili  0.3  /  DBili  x   /  AST  115[H]  /  ALT  91[H]  /  AlkPhos  48  07-14    PT/INR - ( 13 Jul 2025 15:00 )   PT: 14.6 sec;   INR: 1.26 ratio         PTT - ( 13 Jul 2025 15:00 )  PTT:28.7 sec  Urinalysis Basic - ( 15 Jul 2025 06:25 )    Color: x / Appearance: x / SG: x / pH: x  Gluc: 121 mg/dL / Ketone: x  / Bili: x / Urobili: x   Blood: x / Protein: x / Nitrite: x   Leuk Esterase: x / RBC: x / WBC x   Sq Epi: x / Non Sq Epi: x / Bacteria: x      CAPILLARY BLOOD GLUCOSE            RADIOLOGY & ADDITIONAL TESTS:    Imaging Personally Reviewed:  YES/NO    Consultant(s) Notes Reviewed:   YES/ No    Care Discussed with Consultants :     Plan of care was discussed with patient and /or primary care giver; all questions and concerns were addressed and care was aligned with patient's wishes.

## 2025-07-15 NOTE — DIETITIAN NUTRITION RISK NOTIFICATION - COMMENTS
Please refer to initial dietitian note for comprehensive nutrition assessment.  Kirby Trejo RD (Available on Teams)

## 2025-07-15 NOTE — PROGRESS NOTE ADULT - TIME BILLING
-Review of hospital course, labs, vitals, medical records  -Bedside exam and interview  -Discussed plan and coordinated care with ACP/housestaff, family, specialists, /  -Documenting the encounter.  -This time excludes teaching and separately reported services.

## 2025-07-15 NOTE — DIETITIAN INITIAL EVALUATION ADULT - REASON FOR ADMISSION
Ambulatory dysfunction due to left hip pain noted with Iliopsoas hematoma I personally performed the service described in the documentation recorded by the scribe in my presence, and it accurately and completely records my words and actions.

## 2025-07-15 NOTE — DIETITIAN INITIAL EVALUATION ADULT - ORAL NUTRITION SUPPLEMENTS
Pt requesting low sugar oral nutrition supplement. Recommend Glucerna (provides 220 kcal, 10 gm protein per 8oz serving) BID.

## 2025-07-15 NOTE — PHARMACOTHERAPY INTERVENTION NOTE - COMMENTS
Provided patient counseling on pt's new medications and side effects of those medications. Also provided pt with printed information. Briefly went over inpatient medications as well. Answered pt's questions.  
Patient identified by [facility] for Patients 66 y/o and Older Report. No recommendation at this time.

## 2025-07-15 NOTE — DIETITIAN INITIAL EVALUATION ADULT - PROBLEM SELECTOR PLAN 3
Hx of thyroid cancer on Mekinist 2mg qd and Tafinlar 150 BID  Treatment at [facility] [place]    - PATIENT TO PROVIDE MEDICATIONS, NON FORMULARY AT [facility]  - Consider Heme Onc consult for thyroid cancer

## 2025-07-15 NOTE — CHART NOTE - NSCHARTNOTESELECT_GEN_ALL_CORE
Chemo drugs/Event Note
Critical/Event Note
Primary Oncologist contact information/Off Service Note
Code Sepsis f/u/Event Note
Code Sepsis/Event Note

## 2025-07-16 ENCOUNTER — TRANSCRIPTION ENCOUNTER (OUTPATIENT)
Age: 75
End: 2025-07-16

## 2025-07-16 LAB
ANION GAP SERPL CALC-SCNC: 5 MMOL/L — SIGNIFICANT CHANGE UP (ref 5–17)
BUN SERPL-MCNC: 34 MG/DL — HIGH (ref 7–18)
CALCIUM SERPL-MCNC: 7.9 MG/DL — LOW (ref 8.4–10.5)
CHLORIDE SERPL-SCNC: 104 MMOL/L — SIGNIFICANT CHANGE UP (ref 96–108)
CO2 SERPL-SCNC: 26 MMOL/L — SIGNIFICANT CHANGE UP (ref 22–31)
CREAT SERPL-MCNC: 1.02 MG/DL — SIGNIFICANT CHANGE UP (ref 0.5–1.3)
EGFR: 77 ML/MIN/1.73M2 — SIGNIFICANT CHANGE UP
EGFR: 77 ML/MIN/1.73M2 — SIGNIFICANT CHANGE UP
GLUCOSE SERPL-MCNC: 114 MG/DL — HIGH (ref 70–99)
HCT VFR BLD CALC: 23.2 % — LOW (ref 39–50)
HGB BLD-MCNC: 8 G/DL — LOW (ref 13–17)
MCHC RBC-ENTMCNC: 30 PG — SIGNIFICANT CHANGE UP (ref 27–34)
MCHC RBC-ENTMCNC: 34.5 G/DL — SIGNIFICANT CHANGE UP (ref 32–36)
MCV RBC AUTO: 86.9 FL — SIGNIFICANT CHANGE UP (ref 80–100)
NRBC BLD AUTO-RTO: 0 /100 WBCS — SIGNIFICANT CHANGE UP (ref 0–0)
PLATELET # BLD AUTO: 110 K/UL — LOW (ref 150–400)
POTASSIUM SERPL-MCNC: 3.4 MMOL/L — LOW (ref 3.5–5.3)
POTASSIUM SERPL-SCNC: 3.4 MMOL/L — LOW (ref 3.5–5.3)
RBC # BLD: 2.67 M/UL — LOW (ref 4.2–5.8)
RBC # FLD: 13.6 % — SIGNIFICANT CHANGE UP (ref 10.3–14.5)
SODIUM SERPL-SCNC: 135 MMOL/L — SIGNIFICANT CHANGE UP (ref 135–145)
WBC # BLD: 4.47 K/UL — SIGNIFICANT CHANGE UP (ref 3.8–10.5)
WBC # FLD AUTO: 4.47 K/UL — SIGNIFICANT CHANGE UP (ref 3.8–10.5)

## 2025-07-16 PROCEDURE — 99233 SBSQ HOSP IP/OBS HIGH 50: CPT | Mod: FS

## 2025-07-16 RX ADMIN — DABRAFENIB 150 MILLIGRAM(S): 50 CAPSULE ORAL at 20:58

## 2025-07-16 RX ADMIN — DABRAFENIB 150 MILLIGRAM(S): 50 CAPSULE ORAL at 09:44

## 2025-07-16 RX ADMIN — TRAMETINIB 2 MILLIGRAM(S): 0.05 POWDER, FOR SOLUTION ORAL at 20:58

## 2025-07-16 RX ADMIN — AMLODIPINE BESYLATE 10 MILLIGRAM(S): 10 TABLET ORAL at 05:35

## 2025-07-16 RX ADMIN — LOSARTAN POTASSIUM 50 MILLIGRAM(S): 100 TABLET, FILM COATED ORAL at 05:35

## 2025-07-16 RX ADMIN — Medication 40 MILLIEQUIVALENT(S): at 09:44

## 2025-07-16 NOTE — DISCHARGE NOTE PROVIDER - NSDCCPCAREPLAN_GEN_ALL_CORE_FT
PRINCIPAL DISCHARGE DIAGNOSIS  Diagnosis: Intramuscular hematoma  Assessment and Plan of Treatment:       SECONDARY DISCHARGE DIAGNOSES  Diagnosis: Cancer of thyroid  Assessment and Plan of Treatment: Continue the treatment as recommended by your oncology team    Follow up with your oncologist at [facility] [place]  Follow up on results of your recent biopsy of  thyroid at [facility]  You have your PET scans scheduled outpatient at [facility].   Please follow up with your doctor    Diagnosis: Internal and external jugular vein thrombosis  Assessment and Plan of Treatment:      PRINCIPAL DISCHARGE DIAGNOSIS  Diagnosis: Intramuscular hematoma  Assessment and Plan of Treatment: You presented with left hip pain and difficulties ambulationg   CT showed  left hip  intramuscular hematoma   likely du to anticoagulation therapy. Lovenox was on hold during your hospitalization   repeat CT Abdomen and Pelvis showed no change in moderate to large left iliopsoas intramuscular hematoma.   Continue to hold Lovenox. Follow up with your PCP and the oncology team from [facility] regarding resuming your Lovenox   PT recommended ....      SECONDARY DISCHARGE DIAGNOSES  Diagnosis: Cancer of thyroid  Assessment and Plan of Treatment: Continue the treatment as recommended by your oncology team    Follow up with your oncologist at [facility]  Follow up on results of your recent biopsy of  thyroid at [facility]  You have your PET scans scheduled outpatient at [facility].   Please follow up with your doctor    Diagnosis: Internal and external jugular vein thrombosis  Assessment and Plan of Treatment:      PRINCIPAL DISCHARGE DIAGNOSIS  Diagnosis: Intramuscular hematoma  Assessment and Plan of Treatment: You presented with left hip pain and difficulties ambulating.   CT showed  left hip  intramuscular hematoma likely due to anticoagulation therapy. Lovenox was on hold during your hospitalization. Repeat CT Abdomen and Pelvis showed no change in moderate to large left iliopsoas intramuscular hematoma. You were seen by hematologist/oncologist Dr. Huy Miguel (who is affiliated with [facility] [place]). He believes that the risks of bleeding on anticoagulation like lovenox, are higher than the risks of holding these medications. Therefore, we recommend holding the Lovenox.  After your inpatient rehab, it is important to follow up with your doctors at [facility] to talk about other options for your jugular vein thrombosis.      SECONDARY DISCHARGE DIAGNOSES  Diagnosis: Internal and external jugular vein thrombosis  Assessment and Plan of Treatment: You were seen by hematologist/oncologist Dr. Huy Miguel (who is affiliated with [facility]). He believes that the risks of bleeding on anticoagulation like lovenox, are higher than the risks of holding these medications. Therefore, we recommend holding the Lovenox.  After your inpatient rehab, it is important to follow up with your doctors at [facility] to talk about other options for your jugular vein thrombosis.    Diagnosis: Cancer of thyroid  Assessment and Plan of Treatment: Continue your chemotherapy.  Follow up with your oncologist at [facility].  Follow up on results of your recent biopsy of  thyroid at [facility].  Please follow up with your doctor     PRINCIPAL DISCHARGE DIAGNOSIS  Diagnosis: Intramuscular hematoma  Assessment and Plan of Treatment: You presented with left hip pain and difficulties ambulating.   CT showed  left hip  intramuscular hematoma likely due to anticoagulation therapy. You had a significant drop in your red blood cell levels. Lovenox was on hold during your hospitalization. Your red blood cell levels stabilized. Repeat CT Abdomen and Pelvis showed no change in the hematoma. This indicates that there was no further bleeding. You were seen by hematologist/oncologist Dr. Huy Miguel ([place] [facility] [place] He believes that the risks of bleeding on anticoagulation like lovenox, are higher than the risks of holding these medications. Therefore, we recommend holding the Lovenox.  After your inpatient rehab, it is important to follow up with [place] [facility] [place] talk about other options for your jugular vein thrombosis.      SECONDARY DISCHARGE DIAGNOSES  Diagnosis: Internal and external jugular vein thrombosis  Assessment and Plan of Treatment: You were seen by hematologist/oncologist Dr. Huy Miguel (who [place] [facility] [place] He believes that the risks of bleeding on anticoagulation like lovenox, are higher than the risks of holding these medications. Therefore, we recommend holding the Lovenox.  After your inpatient rehab, it is important to follow up with [place] [facility] [place] talk about other options for your jugular vein thrombosis.    Diagnosis: Cancer of thyroid  Assessment and Plan of Treatment: Continue your chemotherapy.  [place] with [place] [facility] [place]  [place] results of your recent biopsy of  [place] [facility] [place]  Please follow up with your doctor    Diagnosis: Intraabdominal hemorrhage  Assessment and Plan of Treatment: You were also found to have a small amount of bleeding in your abdominal cavity (hemoperitoneum). After holding your anticoagulation, you red blood cell levels remained stable for several days, signifying that the bleeding stopped.

## 2025-07-16 NOTE — DISCHARGE NOTE PROVIDER - DETAILS OF MALNUTRITION DIAGNOSIS/DIAGNOSES
This patient has been assessed with a concern for Malnutrition and was treated during this hospitalization for the following Nutrition diagnosis/diagnoses:     -  07/15/2025: Severe protein-calorie malnutrition   -  07/15/2025: Underweight (BMI < 19)

## 2025-07-16 NOTE — PROGRESS NOTE ADULT - SUBJECTIVE AND OBJECTIVE BOX
NP Note discussed with  primary attending    Patient is a 74y old  Male who presents with a chief complaint of Ambulatory dysfunction due to left hip pain noted with Iliopsoas hematoma (15 Jul 2025 11:00)      INTERVAL HPI/OVERNIGHT EVENTS: no new complaints    MEDICATIONS  (STANDING):  amLODIPine   Tablet 10 milliGRAM(s) Oral daily  dabrafenib 150 milliGRAM(s) Oral <User Schedule>  losartan 50 milliGRAM(s) Oral daily  polyethylene glycol 3350 17 Gram(s) Oral daily  senna 2 Tablet(s) Oral at bedtime  trametinib 2 milliGRAM(s) Oral <User Schedule>    MEDICATIONS  (PRN):  acetaminophen     Tablet .. 650 milliGRAM(s) Oral every 6 hours PRN Temp greater or equal to 38C (100.4F), Mild Pain (1 - 3)  aluminum hydroxide/magnesium hydroxide/simethicone Suspension 30 milliLiter(s) Oral every 4 hours PRN Dyspepsia  melatonin 3 milliGRAM(s) Oral at bedtime PRN Insomnia  morphine  - Injectable 2 milliGRAM(s) IV Push every 6 hours PRN Severe Pain (7 - 10)  morphine  - Injectable 1 milliGRAM(s) IV Push every 6 hours PRN Moderate Pain (4 - 6)      __________________________________________________  REVIEW OF SYSTEMS:    CONSTITUTIONAL: No fever,   RESPIRATORY: No cough; No shortness of breath  CARDIOVASCULAR: No chest pain, no palpitations  GASTROINTESTINAL: No pain. No nausea or vomiting; No diarrhea   NEUROLOGICAL: No headache or numbness, no tremors  MUSCULOSKELETAL: left hip pain with movement   GENITOURINARY: no dysuria,         Vital Signs Last 24 Hrs  T(C): 36.5 (16 Jul 2025 05:37), Max: 36.8 (15 Jul 2025 21:05)  T(F): 97.7 (16 Jul 2025 05:37), Max: 98.3 (15 Jul 2025 21:05)  HR: 67 (16 Jul 2025 11:53) (67 - 88)  BP: 137/74 (16 Jul 2025 11:53) (122/67 - 160/72)  BP(mean): 95 (16 Jul 2025 11:53) (85 - 98)  RR: 18 (16 Jul 2025 05:37) (17 - 18)  SpO2: 97% (16 Jul 2025 11:53) (95% - 97%)    Parameters below as of 16 Jul 2025 11:53  Patient On (Oxygen Delivery Method): room air        ________________________________________________  PHYSICAL EXAM:  GENERAL: NAD  CHEST/LUNG: Clear to ausculitation bilaterally   HEART: S1 S2  regular; no murmurs, gallops or rubs  ABDOMEN: Soft, Nontender,   EXTREMITIES: decreased ROM due to pain   SKIN: warm and dry; no rash  NERVOUS SYSTEM:  Awake and alert; Oriented  to place, person and time ; no new deficits    _________________________________________________  LABS:                        8.0    4.47  )-----------( 110      ( 16 Jul 2025 05:50 )             23.2     07-16    135  |  104  |  34[H]  ----------------------------<  114[H]  3.4[L]   |  26  |  1.02    Ca    7.9[L]      16 Jul 2025 05:50        Urinalysis Basic - ( 16 Jul 2025 05:50 )    Color: x / Appearance: x / SG: x / pH: x  Gluc: 114 mg/dL / Ketone: x  / Bili: x / Urobili: x   Blood: x / Protein: x / Nitrite: x   Leuk Esterase: x / RBC: x / WBC x   Sq Epi: x / Non Sq Epi: x / Bacteria: x      CAPILLARY BLOOD GLUCOSE            RADIOLOGY & ADDITIONAL TESTS:    Imaging Personally Reviewed:  YES/NO    Consultant(s) Notes Reviewed:   YES/ No    Care Discussed with Consultants :     Plan of care was discussed with patient and /or primary care giver; all questions and concerns were addressed and care was aligned with patient's wishes.

## 2025-07-16 NOTE — PROGRESS NOTE ADULT - NS ATTEND AMEND GEN_ALL_CORE FT
74M PMH thyroid cancer on PO chemo, R IJ thrombus on lovenox, HTN, HLD, Hyperkalemia, Elevated PTH, Lumbar Radiculopathy p/w cc L hip pain and ambulatory dysfunction, found with iliopsoas hematoma.    AP:  iliopsoas hematoma  thyroid cancer on PO chemo  R IJ thrombus on lovenox, now held  HTN  HLD  Hyperkalemia  Elevated PTH    -s/p 1u RBC with appropriate response    -CT Angio done Monday; no extravasation of contrast    -no plan for embolization by vasc surg  -initially started on zosyn for fever 7/13    -zosyn was continued for concern for hematoma infection, however pt no longer has pain in the area, WBC wnl, BCx NGTD    -discontinued zosyn, monitor off abx    -suspect fever from thrombus vs cancer vs uninfected hematoma  -discussed with heme onc Dr. Miguel    -hold AC until follow up with [facility]    -reschedule o/p PET scan  -OOBC daily  -PT eval rec KALPANA  -resume home meds  -dvt ppx held due to hematoma, hemoperitoneum

## 2025-07-16 NOTE — PROGRESS NOTE ADULT - ASSESSMENT
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 No   contrast extravasation identified at this time.  Pt was evaluated by PT and recommended KALPANA, pt in agreement

## 2025-07-16 NOTE — PROGRESS NOTE ADULT - ASSESSMENT
· Assessment	    1. 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 Recommend continuing  treatment inpatient, His swallowing is better.  breathing better.  mass smaller  - [place] [facility] [place]   - Patient had a PET scans scheduled [place] [facility]  Will need to reschedule.  - [place] [facility] [place]     2 Iliopsoas Hematoma. Continue to hold Lovenox.   -  probably lovenox injected IM rather sq.  off AC now. Follow up re-evaluation outpatient along with PET.  - repeat CT showed decrease in size of hematoma.   -  will do Dopplers to evaluate if IJ thrombus still present.  Otherwise asymptomatic.  -  Will follow.    3. Anemia in the setting of acute bleed. Check iron studies, B12, folate and thyroid -panel   - s/p 1 u transfusion, hb better at 8 today.     Will cont to follow

## 2025-07-16 NOTE — DISCHARGE NOTE PROVIDER - ATTENDING DISCHARGE PHYSICAL EXAMINATION:
I have personally seen and examined the patient. I have collaborated with and supervised the midlevel practitioner on the discharge service for the patient. I agree with everything as documented by the midlevel practitioner. I have reviewed and made amendments to the documentation where necessary.    I was present with the Resident during the key portions of the history and exam. I discussed the case with the Resident and agree with the findings and plan as documented in the Resident 's note, unless noted below.    My physical exam on day of discharge:  Alert, answering qs appropriately, following commands  no murmurs heard  breathing comfortably  abdomen NT/ND BS+

## 2025-07-16 NOTE — PROGRESS NOTE ADULT - SUBJECTIVE AND OBJECTIVE BOX
Patient is a 74y old  Male who presents with a chief complaint of Ambulatory dysfunction due to left hip pain noted with Iliopsoas hematoma (14 Jul 2025 16:18)    Subjective:  Patient seen at bedside.  Patient is feeling well.  No overnight events    MEDICATIONS  (STANDING):  amLODIPine   Tablet 10 milliGRAM(s) Oral daily  dabrafenib 150 milliGRAM(s) Oral <User Schedule>  losartan 50 milliGRAM(s) Oral daily  piperacillin/tazobactam IVPB.. 3.375 Gram(s) IV Intermittent every 8 hours  polyethylene glycol 3350 17 Gram(s) Oral daily  senna 2 Tablet(s) Oral at bedtime  trametinib 2 milliGRAM(s) Oral <User Schedule>    MEDICATIONS  (PRN):  acetaminophen     Tablet .. 650 milliGRAM(s) Oral every 6 hours PRN Temp greater or equal to 38C (100.4F), Mild Pain (1 - 3)  aluminum hydroxide/magnesium hydroxide/simethicone Suspension 30 milliLiter(s) Oral every 4 hours PRN Dyspepsia  melatonin 3 milliGRAM(s) Oral at bedtime PRN Insomnia  morphine  - Injectable 2 milliGRAM(s) IV Push every 6 hours PRN Severe Pain (7 - 10)  morphine  - Injectable 1 milliGRAM(s) IV Push every 6 hours PRN Moderate Pain (4 - 6)      ROS  No fever, sweats, chills  No epistaxis, HA, sore throat  No CP, SOB, cough, sputum  No n/v/d, abd pain, melena, hematochezia  No edema  No rash  No anxiety  No back pain, joint pain  No bleeding, bruising  No dysuria, hematuria    Vital Signs Last 24 Hrs  T(C): 36.5 (07-16-25 @ 05:37), Max: 36.8 (07-15-25 @ 21:05)  T(F): 97.7 (07-16-25 @ 05:37), Max: 98.3 (07-15-25 @ 21:05)  HR: 70 (07-16-25 @ 05:37) (70 - 88)  BP: 151/72 (07-16-25 @ 05:37) (136/71 - 160/72)  BP(mean): 98 (07-16-25 @ 05:37) (98 - 98)  RR: 18 (07-16-25 @ 05:37) (17 - 18)  SpO2: 95% (07-16-25 @ 05:37) (95% - 96%)      PE  NAD  Awake, alert  Anicteric, MMM  RRR  CTAB  Abd soft, NT, ND  No c/c/e  No rash grossly  FROM                                  8.0    4.47  )-----------( 110      ( 16 Jul 2025 05:50 )             23.2   07-16    135  |  104  |  34[H]  ----------------------------<  114[H]  3.4[L]   |  26  |  1.02    Ca    7.9[L]      16 Jul 2025 05:50

## 2025-07-16 NOTE — PHYSICAL THERAPY INITIAL EVALUATION ADULT - GENERAL OBSERVATIONS, REHAB EVAL
Patient is received lying supine and appears to be alert and oriented. Patient reports feeling slight discomfort at the left groin when sitting up.

## 2025-07-16 NOTE — DISCHARGE NOTE PROVIDER - CARE PROVIDER_API CALL
Del Tinsley)  Internal Medicine  [address], [address]  [place] [address]  Phone: [phone]  Fax: [phone]  Follow Up Time: 1 month

## 2025-07-16 NOTE — DISCHARGE NOTE PROVIDER - NSDCMRMEDTOKEN_GEN_ALL_CORE_FT
amLODIPine 10 mg oral tablet: 1 tab(s) orally once a day  Cozaar 100 mg oral tablet: 1 tab(s) orally once a day  dabrafenib 75 mg oral capsule: 2 cap(s) orally every 12 hours  losartan 50 mg oral tablet: 1 tab(s) orally once a day  Lovenox 60 mg/0.6 mL injectable solution: 60 milligram(s) subcutaneously 2 times a day  Mekinist 2 mg oral tablet: 1 tab(s) orally once a day  Norvasc 10 mg oral tablet: 1 tab(s) orally once a day  Tafinlar 75 mg oral capsule: 2 cap(s) orally 2 times a day  trametinib 2 mg oral tablet: 1 tab(s) orally once a day   amLODIPine 10 mg oral tablet: 1 tab(s) orally once a day  Cozaar 100 mg oral tablet: 1 tab(s) orally once a day  dabrafenib 75 mg oral capsule: 2 cap(s) orally every 12 hours  losartan 50 mg oral tablet: 1 tab(s) orally once a day  Mekinist 2 mg oral tablet: 1 tab(s) orally once a day  Norvasc 10 mg oral tablet: 1 tab(s) orally once a day  Tafinlar 75 mg oral capsule: 2 cap(s) orally 2 times a day  trametinib 2 mg oral tablet: 1 tab(s) orally once a day

## 2025-07-16 NOTE — DISCHARGE NOTE PROVIDER - HOSPITAL COURSE
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 No   contrast extravasation identified at this time.       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 Transfussed one unit of PRBC, hemoglobin improved to 8 and remains stable   repeat CT Abdomen and Pelvis: No change in moderate to large left iliopsoas intramuscular hematoma. No   contrast extravasation identified at this time.  Lovenox continued to be on hold   Pt was evaluated by PT and recommended ....  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 Transfussed one unit of PRBC, hemoglobin improved to 8 and remains stable   repeat CT Abdomen and Pelvis: No change in moderate to large left iliopsoas intramuscular hematoma. No   contrast extravasation identified at this time.  Lovenox continued to be on hold   Pt was evaluated by PT and recommended [facility], plan for dc to [facility].  Pt to follow up with his oncologist Dr Oreilly at [facility] for further treatment, results of his recent thyroid biopsy

## 2025-07-16 NOTE — PHYSICAL THERAPY INITIAL EVALUATION ADULT - PERTINENT HX OF CURRENT PROBLEM, REHAB EVAL
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 Patient reports that pain started approximately one week ago and has progressively worsened. He is now unable to ambulate due to the pain. Patient denies headaches, fevers, chills, shortness of breath, chest pain, palpitations, nausea, vomiting, changes in urination or bowel movements.

## 2025-07-16 NOTE — PHYSICAL THERAPY INITIAL EVALUATION ADULT - ACTIVE RANGE OF MOTION EXAMINATION, REHAB EVAL
Patient unable to initiate hip flexion or knee extension against gravity./cliff. upper extremity Active ROM was WNL (within normal limits)/Left LE Active ROM was WFL (within functional limits)/deficits as listed below

## 2025-07-17 ENCOUNTER — TRANSCRIPTION ENCOUNTER (OUTPATIENT)
Age: 75
End: 2025-07-17

## 2025-07-17 ENCOUNTER — INPATIENT (INPATIENT)
Facility: HOSPITAL | Age: 75
LOS: 10 days | Discharge: NURSING, REHAB OR LONG-TERM CARE | DRG: 356 | End: 2025-07-28
Attending: STUDENT IN AN ORGANIZED HEALTH CARE EDUCATION/TRAINING PROGRAM | Admitting: STUDENT IN AN ORGANIZED HEALTH CARE EDUCATION/TRAINING PROGRAM
Payer: MEDICARE

## 2025-07-17 VITALS
HEIGHT: 61 IN | HEART RATE: 80 BPM | TEMPERATURE: 98 F | DIASTOLIC BLOOD PRESSURE: 84 MMHG | OXYGEN SATURATION: 96 % | RESPIRATION RATE: 19 BRPM | SYSTOLIC BLOOD PRESSURE: 145 MMHG

## 2025-07-17 VITALS — HEART RATE: 87 BPM | OXYGEN SATURATION: 95 % | SYSTOLIC BLOOD PRESSURE: 151 MMHG | DIASTOLIC BLOOD PRESSURE: 88 MMHG

## 2025-07-17 DIAGNOSIS — Z98.890 OTHER SPECIFIED POSTPROCEDURAL STATES: Chronic | ICD-10-CM

## 2025-07-17 LAB
ANION GAP SERPL CALC-SCNC: 4 MMOL/L — LOW (ref 5–17)
BUN SERPL-MCNC: 27 MG/DL — HIGH (ref 7–18)
CALCIUM SERPL-MCNC: 7.8 MG/DL — LOW (ref 8.4–10.5)
CHLORIDE SERPL-SCNC: 104 MMOL/L — SIGNIFICANT CHANGE UP (ref 96–108)
CO2 SERPL-SCNC: 26 MMOL/L — SIGNIFICANT CHANGE UP (ref 22–31)
CREAT SERPL-MCNC: 0.92 MG/DL — SIGNIFICANT CHANGE UP (ref 0.5–1.3)
EGFR: 87 ML/MIN/1.73M2 — SIGNIFICANT CHANGE UP
EGFR: 87 ML/MIN/1.73M2 — SIGNIFICANT CHANGE UP
GLUCOSE SERPL-MCNC: 109 MG/DL — HIGH (ref 70–99)
HCT VFR BLD CALC: 23.9 % — LOW (ref 39–50)
HGB BLD-MCNC: 8.3 G/DL — LOW (ref 13–17)
MCHC RBC-ENTMCNC: 30 PG — SIGNIFICANT CHANGE UP (ref 27–34)
MCHC RBC-ENTMCNC: 34.7 G/DL — SIGNIFICANT CHANGE UP (ref 32–36)
MCV RBC AUTO: 86.3 FL — SIGNIFICANT CHANGE UP (ref 80–100)
NRBC BLD AUTO-RTO: 0 /100 WBCS — SIGNIFICANT CHANGE UP (ref 0–0)
PLATELET # BLD AUTO: 91 K/UL — LOW (ref 150–400)
POTASSIUM SERPL-MCNC: 3.7 MMOL/L — SIGNIFICANT CHANGE UP (ref 3.5–5.3)
POTASSIUM SERPL-SCNC: 3.7 MMOL/L — SIGNIFICANT CHANGE UP (ref 3.5–5.3)
RBC # BLD: 2.77 M/UL — LOW (ref 4.2–5.8)
RBC # FLD: 13.5 % — SIGNIFICANT CHANGE UP (ref 10.3–14.5)
SODIUM SERPL-SCNC: 134 MMOL/L — LOW (ref 135–145)
WBC # BLD: 4.7 K/UL — SIGNIFICANT CHANGE UP (ref 3.8–10.5)
WBC # FLD AUTO: 4.7 K/UL — SIGNIFICANT CHANGE UP (ref 3.8–10.5)

## 2025-07-17 PROCEDURE — 76376 3D RENDER W/INTRP POSTPROCES: CPT

## 2025-07-17 PROCEDURE — 85014 HEMATOCRIT: CPT

## 2025-07-17 PROCEDURE — 74178 CT ABD&PLV WO CNTR FLWD CNTR: CPT

## 2025-07-17 PROCEDURE — 99239 HOSP IP/OBS DSCHRG MGMT >30: CPT

## 2025-07-17 PROCEDURE — 86850 RBC ANTIBODY SCREEN: CPT

## 2025-07-17 PROCEDURE — 73552 X-RAY EXAM OF FEMUR 2/>: CPT

## 2025-07-17 PROCEDURE — 87637 SARSCOV2&INF A&B&RSV AMP PRB: CPT

## 2025-07-17 PROCEDURE — 83540 ASSAY OF IRON: CPT

## 2025-07-17 PROCEDURE — 80053 COMPREHEN METABOLIC PANEL: CPT

## 2025-07-17 PROCEDURE — 74177 CT ABD & PELVIS W/CONTRAST: CPT

## 2025-07-17 PROCEDURE — 85730 THROMBOPLASTIN TIME PARTIAL: CPT

## 2025-07-17 PROCEDURE — 83735 ASSAY OF MAGNESIUM: CPT

## 2025-07-17 PROCEDURE — 84100 ASSAY OF PHOSPHORUS: CPT

## 2025-07-17 PROCEDURE — 36430 TRANSFUSION BLD/BLD COMPNT: CPT

## 2025-07-17 PROCEDURE — 81001 URINALYSIS AUTO W/SCOPE: CPT

## 2025-07-17 PROCEDURE — 85025 COMPLETE CBC W/AUTO DIFF WBC: CPT

## 2025-07-17 PROCEDURE — 36415 COLL VENOUS BLD VENIPUNCTURE: CPT

## 2025-07-17 PROCEDURE — 84481 FREE ASSAY (FT-3): CPT

## 2025-07-17 PROCEDURE — 86923 COMPATIBILITY TEST ELECTRIC: CPT

## 2025-07-17 PROCEDURE — 93971 EXTREMITY STUDY: CPT

## 2025-07-17 PROCEDURE — 85027 COMPLETE CBC AUTOMATED: CPT

## 2025-07-17 PROCEDURE — 97163 PT EVAL HIGH COMPLEX 45 MIN: CPT

## 2025-07-17 PROCEDURE — 82746 ASSAY OF FOLIC ACID SERUM: CPT

## 2025-07-17 PROCEDURE — 96376 TX/PRO/DX INJ SAME DRUG ADON: CPT

## 2025-07-17 PROCEDURE — 80048 BASIC METABOLIC PNL TOTAL CA: CPT

## 2025-07-17 PROCEDURE — 82607 VITAMIN B-12: CPT

## 2025-07-17 PROCEDURE — 72192 CT PELVIS W/O DYE: CPT

## 2025-07-17 PROCEDURE — 96365 THER/PROPH/DIAG IV INF INIT: CPT

## 2025-07-17 PROCEDURE — 71045 X-RAY EXAM CHEST 1 VIEW: CPT

## 2025-07-17 PROCEDURE — 85018 HEMOGLOBIN: CPT

## 2025-07-17 PROCEDURE — 73700 CT LOWER EXTREMITY W/O DYE: CPT

## 2025-07-17 PROCEDURE — 84439 ASSAY OF FREE THYROXINE: CPT

## 2025-07-17 PROCEDURE — 99285 EMERGENCY DEPT VISIT HI MDM: CPT | Mod: GC

## 2025-07-17 PROCEDURE — 99285 EMERGENCY DEPT VISIT HI MDM: CPT | Mod: 25

## 2025-07-17 PROCEDURE — 85610 PROTHROMBIN TIME: CPT

## 2025-07-17 PROCEDURE — 83605 ASSAY OF LACTIC ACID: CPT

## 2025-07-17 PROCEDURE — 87040 BLOOD CULTURE FOR BACTERIA: CPT

## 2025-07-17 PROCEDURE — 84443 ASSAY THYROID STIM HORMONE: CPT

## 2025-07-17 PROCEDURE — 96375 TX/PRO/DX INJ NEW DRUG ADDON: CPT

## 2025-07-17 PROCEDURE — 86900 BLOOD TYPING SEROLOGIC ABO: CPT

## 2025-07-17 PROCEDURE — 73522 X-RAY EXAM HIPS BI 3-4 VIEWS: CPT

## 2025-07-17 PROCEDURE — 86901 BLOOD TYPING SEROLOGIC RH(D): CPT

## 2025-07-17 PROCEDURE — P9040: CPT

## 2025-07-17 RX ADMIN — DABRAFENIB 150 MILLIGRAM(S): 50 CAPSULE ORAL at 10:01

## 2025-07-17 RX ADMIN — LOSARTAN POTASSIUM 50 MILLIGRAM(S): 100 TABLET, FILM COATED ORAL at 05:43

## 2025-07-17 RX ADMIN — AMLODIPINE BESYLATE 10 MILLIGRAM(S): 10 TABLET ORAL at 05:43

## 2025-07-17 NOTE — PROGRESS NOTE ADULT - PROBLEM SELECTOR PLAN 1
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 No   contrast extravasation identified at this time.  Right gluteus hematoma is slightly smaller than prior.  Hold Lovenox for now  hematology on board  PT recs [facility], CM following, choices given
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 No   contrast extravasation identified at this time.  Right gluteus hematoma is slightly smaller than prior.  Hold Lovenox for now  hematology on board  PT recs [facility]
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 No   contrast extravasation identified at this time.    Right gluteus hematoma is slightly smaller than prior.  Hold Lovenox for now  hematology on board

## 2025-07-17 NOTE — PROGRESS NOTE ADULT - PROBLEM SELECTOR PROBLEM 1
Intraabdominal hemorrhage

## 2025-07-17 NOTE — PROGRESS NOTE ADULT - SUBJECTIVE AND OBJECTIVE BOX
Patient is a 74y old  Male who presents with a chief complaint of Ambulatory dysfunction due to left hip pain noted with Iliopsoas hematoma (14 Jul 2025 16:18)    Subjective:  Patient seen at bedside.  Patient is feeling well.  No overnight events    MEDICATIONS  (STANDING):  amLODIPine   Tablet 10 milliGRAM(s) Oral daily  dabrafenib 150 milliGRAM(s) Oral <User Schedule>  losartan 50 milliGRAM(s) Oral daily  piperacillin/tazobactam IVPB.. 3.375 Gram(s) IV Intermittent every 8 hours  polyethylene glycol 3350 17 Gram(s) Oral daily  senna 2 Tablet(s) Oral at bedtime  trametinib 2 milliGRAM(s) Oral <User Schedule>    MEDICATIONS  (PRN):  acetaminophen     Tablet .. 650 milliGRAM(s) Oral every 6 hours PRN Temp greater or equal to 38C (100.4F), Mild Pain (1 - 3)  aluminum hydroxide/magnesium hydroxide/simethicone Suspension 30 milliLiter(s) Oral every 4 hours PRN Dyspepsia  melatonin 3 milliGRAM(s) Oral at bedtime PRN Insomnia  morphine  - Injectable 2 milliGRAM(s) IV Push every 6 hours PRN Severe Pain (7 - 10)  morphine  - Injectable 1 milliGRAM(s) IV Push every 6 hours PRN Moderate Pain (4 - 6)      ROS  No fever, sweats, chills  No epistaxis, HA, sore throat  No CP, SOB, cough, sputum  No n/v/d, abd pain, melena, hematochezia  No edema  No rash  No anxiety  No back pain, joint pain  No bleeding, bruising  No dysuria, hematuria    Vital Signs Last 24 Hrs  T(C): 36.7 (17 Jul 2025 05:46), Max: 36.7 (16 Jul 2025 20:11)  T(F): 98 (17 Jul 2025 05:46), Max: 98 (16 Jul 2025 20:11)  HR: 71 (17 Jul 2025 05:46) (65 - 77)  BP: 161/78 (17 Jul 2025 05:46) (122/67 - 172/82)  BP(mean): 106 (17 Jul 2025 05:46) (85 - 106)  RR: 18 (17 Jul 2025 05:46) (18 - 18)  SpO2: 96% (17 Jul 2025 05:46) (95% - 97%)    Parameters below as of 17 Jul 2025 05:46  Patient On (Oxygen Delivery Method): room air      PE  NAD  Awake, alert  Anicteric, MMM  RRR  CTAB  Abd soft, NT, ND  No c/c/e  No rash grossly  FROM                            8.3    4.70  )-----------( 91       ( 17 Jul 2025 06:26 )             23.9   07-17    134[L]  |  104  |  27[H]  ----------------------------<  109[H]  3.7   |  26  |  0.92    Ca    7.8[L]      17 Jul 2025 06:26

## 2025-07-17 NOTE — PROGRESS NOTE ADULT - ASSESSMENT
· Assessment	    1. 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 Recommend continuing  treatment inpatient, His swallowing is better.  breathing better.  mass smaller  - Recent biopsy thyroid at [facility]- f/u pathology   - Patient had a PET scans scheduled outpatient today at [facility].  Will need to reschedule.  - O[place] [facility] [place]     2 Iliopsoas Hematoma. Continue to hold Lovenox.   -  probably lovenox injected IM rather sq.  off AC now. Follow up re-evaluation outpatient along with PET.  - repeat CT showed decrease in size of hematoma.   -  will do Dopplers to evaluate if IJ thrombus still present.  Otherwise asymptomatic.  -  Will follow.  - We can hold anticoagulation until his outpatient follow up with [facility].     3. Anemia in the setting of acute bleed. Check iron studies, B12, folate and thyroid -panel   - s/p 1 u transfusion, hb better at 8 today.     Will cont to follow

## 2025-07-17 NOTE — DISCHARGE NOTE NURSING/CASE MANAGEMENT/SOCIAL WORK - FINANCIAL ASSISTANCE
[facility] provides services at a reduced cost to those who are determined to be eligible through [facility]’s financial assistance program. Information regarding [facility]’s financial assistance program can be found by going to [email or web address] or by calling [phone].

## 2025-07-17 NOTE — PROGRESS NOTE ADULT - PROBLEM SELECTOR PLAN 2
Hx of IJ thrombosis, treated with Lovenox  Unclear why not on DOAC    - Hold Lovenox iso psoas bleed  -hematology follows   -resume when indicated
Hx of IJ thrombosis, treated with Lovenox  Unclear why not on DOAC    - Hold Lovenox iso psoas bleed  -hematology follows   -resume when indicated
Hx of IJ thrombosis, treated with Lovenox  Unclear why not on DOAC    - Hold Lovenox iso psoas bleed
Hx of IJ thrombosis, treated with Lovenox  Unclear why not on DOAC    - Hold Lovenox iso psoas bleed  -hematology follows   -resume when indicated

## 2025-07-17 NOTE — PROGRESS NOTE ADULT - PROBLEM SELECTOR PLAN 5
SCD, no AC iso active bleed

## 2025-07-17 NOTE — PROGRESS NOTE ADULT - PROVIDER SPECIALTY LIST ADULT
Heme/Onc
Internal Medicine
Heme/Onc
Heme/Onc
Hospitalist
Hospitalist
Internal Medicine
Heme/Onc
Internal Medicine
Internal Medicine

## 2025-07-17 NOTE — PROGRESS NOTE ADULT - PROBLEM SELECTOR PROBLEM 2
Internal and external jugular vein thrombosis

## 2025-07-17 NOTE — PROGRESS NOTE ADULT - NUTRITIONAL ASSESSMENT
This patient has been assessed with a concern for Malnutrition and has been determined to have a diagnosis/diagnoses of Underweight (BMI < 19) and Severe protein-calorie malnutrition.    This patient is being managed with:   Diet Regular-  DASH/TLC {Sodium & Cholesterol Restricted}  Kosher  Supplement Feeding Modality:  Oral  Glucerna Shake Cans or Servings Per Day:  1       Frequency:  Two Times a day  Entered: Jul 15 2025 12:43PM  
This patient has been assessed with a concern for Malnutrition and has been determined to have a diagnosis/diagnoses of Severe protein-calorie malnutrition and Underweight (BMI < 19).    This patient is being managed with:   Diet Regular-  DASH/TLC {Sodium & Cholesterol Restricted}  Kosher  Supplement Feeding Modality:  Oral  Glucerna Shake Cans or Servings Per Day:  1       Frequency:  Two Times a day  Entered: Jul 15 2025 12:43PM  
This patient has been assessed with a concern for Malnutrition and has been determined to have a diagnosis/diagnoses of Severe protein-calorie malnutrition and Underweight (BMI < 19).    This patient is being managed with:   Diet Regular-  DASH/TLC {Sodium & Cholesterol Restricted}  Kosher  Supplement Feeding Modality:  Oral  Glucerna Shake Cans or Servings Per Day:  1       Frequency:  Two Times a day  Entered: Jul 15 2025 12:43PM

## 2025-07-17 NOTE — PROGRESS NOTE ADULT - SUBJECTIVE AND OBJECTIVE BOX
NP Note discussed with  Primary Attending    Patient is a 74y old  Male who presents with a chief complaint of Ambulatory dysfunction due to left hip pain noted with Iliopsoas hematoma (15 Jul 2025 11:00).  Reports persistent left hip pain, states he thinks hematoma is due to Lovenox injections he was self administering.      INTERVAL HPI/OVERNIGHT EVENTS: no new complaints    MEDICATIONS  (STANDING):  amLODIPine   Tablet 10 milliGRAM(s) Oral daily  dabrafenib 150 milliGRAM(s) Oral <User Schedule>  losartan 50 milliGRAM(s) Oral daily  polyethylene glycol 3350 17 Gram(s) Oral daily  senna 2 Tablet(s) Oral at bedtime  trametinib 2 milliGRAM(s) Oral <User Schedule>    MEDICATIONS  (PRN):  acetaminophen     Tablet .. 650 milliGRAM(s) Oral every 6 hours PRN Temp greater or equal to 38C (100.4F), Mild Pain (1 - 3)  aluminum hydroxide/magnesium hydroxide/simethicone Suspension 30 milliLiter(s) Oral every 4 hours PRN Dyspepsia  melatonin 3 milliGRAM(s) Oral at bedtime PRN Insomnia  morphine  - Injectable 2 milliGRAM(s) IV Push every 6 hours PRN Severe Pain (7 - 10)  morphine  - Injectable 1 milliGRAM(s) IV Push every 6 hours PRN Moderate Pain (4 - 6)      __________________________________________________  REVIEW OF SYSTEMS:    CONSTITUTIONAL: No fever,   EYES: no acute visual disturbances  NECK: No pain or stiffness  RESPIRATORY: No cough; No shortness of breath  CARDIOVASCULAR: No chest pain, no palpitations  GASTROINTESTINAL: No pain. No nausea or vomiting; No diarrhea   NEUROLOGICAL: No headache or numbness, no tremors  MUSCULOSKELETAL: No joint pain, no muscle pain  GENITOURINARY: no dysuria, no frequency, no hesitancy  PSYCHIATRY: no depression , no anxiety  ALL OTHER  ROS negative        Vital Signs Last 24 Hrs  T(C): 36.7 (17 Jul 2025 05:46), Max: 36.7 (16 Jul 2025 20:11)  T(F): 98 (17 Jul 2025 05:46), Max: 98 (16 Jul 2025 20:11)  HR: 71 (17 Jul 2025 05:46) (65 - 77)  BP: 161/78 (17 Jul 2025 05:46) (154/74 - 172/82)  BP(mean): 106 (17 Jul 2025 05:46) (106 - 106)  RR: 18 (17 Jul 2025 05:46) (18 - 18)  SpO2: 96% (17 Jul 2025 05:46) (95% - 96%)    Parameters below as of 17 Jul 2025 05:46  Patient On (Oxygen Delivery Method): room air        ________________________________________________  PHYSICAL EXAM:  Comfortable in bed  GENERAL: NAD  HEENT: Normocephalic;  conjunctivae and sclerae clear; moist mucous membranes;   NECK : supple  CHEST/LUNG: Clear to auscultation bilaterally with good air entry   HEART: S1 S2  regular; no murmurs, gallops or rubs  ABDOMEN: Soft, Nontender, Nondistended; Bowel sounds present  EXTREMITIES: no cyanosis; no edema; no calf tenderness  SKIN: warm and dry; no rash  NERVOUS SYSTEM:  Awake and alert; Oriented  to place, person and time ; no new deficits    _________________________________________________  LABS:                        8.3    4.70  )-----------( 91       ( 17 Jul 2025 06:26 )             23.9     07-17    134[L]  |  104  |  27[H]  ----------------------------<  109[H]  3.7   |  26  |  0.92    Ca    7.8[L]      17 Jul 2025 06:26        Urinalysis Basic - ( 17 Jul 2025 06:26 )    Color: x / Appearance: x / SG: x / pH: x  Gluc: 109 mg/dL / Ketone: x  / Bili: x / Urobili: x   Blood: x / Protein: x / Nitrite: x   Leuk Esterase: x / RBC: x / WBC x   Sq Epi: x / Non Sq Epi: x / Bacteria: x      CAPILLARY BLOOD GLUCOSE    RADIOLOGY & ADDITIONAL TESTS:    < from: US Duplex Venous Upper Ext Ltd, Left (07.14.25 @ 15:08) >    ACC: [id number] EXAM:  US DPLX UPR EXT VEINS LTD LT   ORDERED BY: CHRIS ASH     PROCEDURE DATE:  07/14/2025          INTERPRETATION:  CLINICAL INDICATION: DVT right IJ before    TECHNIQUE: Grayscale, color Doppler and spectral Doppler ultrasound was   utilized to evaluate the left upper extremity deep venous system.    COMPARISON: None.    FINDINGS: There is no obvious thrombus in the left internal jugular vein,   subclavian vein, axillary vein or brachial vein. Visualized left radial   vein and ulnar vein are patent.    Visualized left basilic vein is patent. Left cephalic vein is attenuated   and difficult to assess.    IMPRESSION:    No obvious deep vein thrombosis at the left upper extremity.    --- End of Report ---    < end of copied text >      < from: CT Abdomen and Pelvis w/ IV Cont (07.14.25 @ 14:34) >    ACC: [id number] EXAM:  CT ABDOMEN AND PELVIS IC   ORDERED BY: APPLE BAHENA     PROCEDURE DATE:  07/14/2025          INTERPRETATION:  CLINICAL INFORMATION: 74 years  Male with drop in   hemoglobin to 6.8.    COMPARISON: CTA abdomen and pelvis 7/12/2025 and CT bony pelvis and   femurs 7/12/2025    CONTRAST/COMPLICATIONS:  IV Contrast: Omnipaque 350  90 cc administered   10 cc discarded  Oral Contrast: NONE.    PROCEDURE:  CT of the Abdomen and Pelvis was performed.  Precontrast, Arterial and Delayed phases were performed.  Sagittal and coronal reformats were performed.    FINDINGS:  LOWER CHEST: Within normal limits.    LIVER: Left lobe hypodensity too small to characterize.  BILE DUCTS: Normal caliber.  GALLBLADDER: Cholelithiasis.  SPLEEN: Within normal limits.  PANCREAS: Within normal limits.  ADRENALS: Within normal limits.  KIDNEYS/URETERS: Atrophic left kidney with multiple small cysts and   hypodensities too small to characterize. No hydronephrosis bilaterally.   Symmetrical nephrograms.    BLADDER: Minimally distended.  REPRODUCTIVE ORGANS: Prostate within normal limits. Redemonstrated   undescended right testicle in the right inguinal canal.    BOWEL: No bowel obstruction. Appendix is not visualized and cannot be   assessed.. Rectal fecal impaction with distention to 8.0 cm.  PERITONEUM/RETROPERITONEUM: No change in moderate to large left iliopsoas   intramuscular hematoma. For reference, at the level of the sacral   promontory, the hematoma measures 8.7 x 5.7 x 19.5 cm (11:93), previously   9.4 x 5.6 x 19.1 cm. The hematoma again extends to the level of the   greater trochanter. No active contrast extravasation is identified at   this time. Trace ascites in the right paracolic gutter, new compared with   prior. Trace left paracolic ascites decreased from prior. Small presacral   edema unchanged.  VESSELS: Atherosclerotic changes.  LYMPH NODES: No lymphadenopathy.  ABDOMINAL WALL: Previously seen right gluteus kiesha muscle now measures   4.6 x 3.4 cm, previously 5.1 x 4.4 cm.  BONES: ORIF right and left hip. Large left medial thigh lipoma with   septations extending into the left pelvic sidewall. Recommend follow-up   MRI for characterization.    IMPRESSION:    No change in moderate to large left iliopsoas intramuscular hematoma. No   contrast extravasation identified at this time.    Right gluteus hematoma is slightly smaller than prior.    Trace ascites overall unchanged (slightly increased on the right and   slightly decreased on the left).    Developing rectal fecal impaction with distention to 8.0 cm.    Undescended right testicle. Outpatient follow-up advised.    Large medial thigh lipoma. Recommend nonemergent MRI follow-up.    < end of copied text >      < from: Xray Chest 1 View AP/PA (07.14.25 @ 14:25) >    ACC: [id number] EXAM:  XR CHEST AP OR PA 1V   ORDERED BY: CHRIS ASH     PROCEDURE DATE:  07/14/2025          INTERPRETATION:  AP chest on July 14, 2025 at 2:17 PM. Patient has   abdominal wall contusion and has the flu.    Heart size is within normallimits.    Lungs are clear.    Chest is similar to July 13.    IMPRESSION: No acute finding or change.    < end of copied text >      Imaging Personally Reviewed:  YES/NO    Consultant(s) Notes Reviewed:   YES/ No    Care Discussed with Consultants :     Plan of care was discussed with patient and /or primary care giver; all questions and concerns were addressed and care was aligned with patient's wishes.

## 2025-07-17 NOTE — DISCHARGE NOTE NURSING/CASE MANAGEMENT/SOCIAL WORK - PATIENT PORTAL LINK FT
You can access the FollowMyHealth Patient Portal offered by [facility] by registering at the following website: [email or web address]. By joining [facility]’s FollowMyHealth portal, you will also be able to view your health information using other applications (apps) compatible with our system.

## 2025-07-17 NOTE — PROGRESS NOTE ADULT - PROBLEM SELECTOR PLAN 4
Hx of HTN on Cozaar and Amlodipine    - C/w home medications with parameters

## 2025-07-17 NOTE — PROGRESS NOTE ADULT - PROBLEM SELECTOR PLAN 3
Hx of thyroid cancer on Mekinist 2mg qd and Tafinlar 150 BID  continue with oral treatment   Treatment at [facility]  hem/onc on board   to  follow up at [facility] with Dr Oreilly after discharge  OK with hem/onc to discharge to [facility]
Hx of thyroid cancer on Mekinist 2mg qd and Tafinlar 150 BID  [place] [facility]  - Consider Heme Onc consult for thyroid cancer
Hx of thyroid cancer on Mekinist 2mg qd and Tafinlar 150 BID  continue with oral treatment   Treatment at [facility]  hem/onc on board   to  follow up at [facility] with Dr Oreilly after discharge
Hx of thyroid cancer on Mekinist 2mg qd and Tafinlar 150 BID  [place] [facility] [place]  - Consider Heme Onc consult for thyroid cancer

## 2025-07-17 NOTE — PROGRESS NOTE ADULT - ASSESSMENT
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 No   contrast extravasation identified at this time.  Pt was evaluated by PT and recommended [facility], pt in agreement

## 2025-07-18 DIAGNOSIS — A41.89 OTHER SPECIFIED SEPSIS: ICD-10-CM

## 2025-07-18 PROBLEM — C73 MALIGNANT NEOPLASM OF THYROID GLAND: Chronic | Status: ACTIVE | Noted: 2025-07-13

## 2025-07-18 PROBLEM — I10 ESSENTIAL (PRIMARY) HYPERTENSION: Chronic | Status: ACTIVE | Noted: 2025-07-13

## 2025-07-18 LAB
ALBUMIN SERPL ELPH-MCNC: 2.9 G/DL — LOW (ref 3.3–5)
ALP SERPL-CCNC: 58 U/L — SIGNIFICANT CHANGE UP (ref 40–120)
ALT FLD-CCNC: 47 U/L — HIGH (ref 10–45)
ANION GAP SERPL CALC-SCNC: 13 MMOL/L — SIGNIFICANT CHANGE UP (ref 5–17)
APPEARANCE UR: ABNORMAL
APTT BLD: 24.9 SEC — LOW (ref 26.1–36.8)
AST SERPL-CCNC: 56 U/L — HIGH (ref 10–40)
BACTERIA # UR AUTO: NEGATIVE /HPF — SIGNIFICANT CHANGE UP
BASOPHILS # BLD AUTO: 0.03 K/UL — SIGNIFICANT CHANGE UP (ref 0–0.2)
BASOPHILS NFR BLD AUTO: 0.3 % — SIGNIFICANT CHANGE UP (ref 0–2)
BILIRUB SERPL-MCNC: 0.6 MG/DL — SIGNIFICANT CHANGE UP (ref 0.2–1.2)
BILIRUB UR-MCNC: NEGATIVE — SIGNIFICANT CHANGE UP
BUN SERPL-MCNC: 38 MG/DL — HIGH (ref 7–23)
CALCIUM SERPL-MCNC: 8.5 MG/DL — SIGNIFICANT CHANGE UP (ref 8.4–10.5)
CAST: 4 /LPF — SIGNIFICANT CHANGE UP (ref 0–4)
CHLORIDE SERPL-SCNC: 98 MMOL/L — SIGNIFICANT CHANGE UP (ref 96–108)
CO2 SERPL-SCNC: 20 MMOL/L — LOW (ref 22–31)
COLOR SPEC: YELLOW — SIGNIFICANT CHANGE UP
CREAT SERPL-MCNC: 1.16 MG/DL — SIGNIFICANT CHANGE UP (ref 0.5–1.3)
CULTURE RESULTS: SIGNIFICANT CHANGE UP
CULTURE RESULTS: SIGNIFICANT CHANGE UP
DIFF PNL FLD: NEGATIVE — SIGNIFICANT CHANGE UP
EGFR: 66 ML/MIN/1.73M2 — SIGNIFICANT CHANGE UP
EGFR: 66 ML/MIN/1.73M2 — SIGNIFICANT CHANGE UP
EOSINOPHIL # BLD AUTO: 0.11 K/UL — SIGNIFICANT CHANGE UP (ref 0–0.5)
EOSINOPHIL NFR BLD AUTO: 1.2 % — SIGNIFICANT CHANGE UP (ref 0–6)
GLUCOSE SERPL-MCNC: 128 MG/DL — HIGH (ref 70–99)
GLUCOSE UR QL: NEGATIVE MG/DL — SIGNIFICANT CHANGE UP
HCT VFR BLD CALC: 27.5 % — LOW (ref 39–50)
HGB BLD-MCNC: 9 G/DL — LOW (ref 13–17)
IMM GRANULOCYTES # BLD AUTO: 0.1 K/UL — HIGH (ref 0–0.07)
IMM GRANULOCYTES NFR BLD AUTO: 1.1 % — HIGH (ref 0–0.9)
INR BLD: 1.19 RATIO — HIGH (ref 0.85–1.16)
KETONES UR QL: NEGATIVE MG/DL — SIGNIFICANT CHANGE UP
LEUKOCYTE ESTERASE UR-ACNC: NEGATIVE — SIGNIFICANT CHANGE UP
LYMPHOCYTES # BLD AUTO: 1.14 K/UL — SIGNIFICANT CHANGE UP (ref 1–3.3)
LYMPHOCYTES NFR BLD AUTO: 12.5 % — LOW (ref 13–44)
MCHC RBC-ENTMCNC: 29.2 PG — SIGNIFICANT CHANGE UP (ref 27–34)
MCHC RBC-ENTMCNC: 32.7 G/DL — SIGNIFICANT CHANGE UP (ref 32–36)
MCV RBC AUTO: 89.3 FL — SIGNIFICANT CHANGE UP (ref 80–100)
MONOCYTES # BLD AUTO: 0.57 K/UL — SIGNIFICANT CHANGE UP (ref 0–0.9)
MONOCYTES NFR BLD AUTO: 6.3 % — SIGNIFICANT CHANGE UP (ref 2–14)
NEUTROPHILS # BLD AUTO: 7.15 K/UL — SIGNIFICANT CHANGE UP (ref 1.8–7.4)
NEUTROPHILS NFR BLD AUTO: 78.6 % — HIGH (ref 43–77)
NITRITE UR-MCNC: NEGATIVE — SIGNIFICANT CHANGE UP
NRBC # BLD AUTO: 0 K/UL — SIGNIFICANT CHANGE UP (ref 0–0)
NRBC # FLD: 0 K/UL — SIGNIFICANT CHANGE UP (ref 0–0)
NRBC BLD AUTO-RTO: 0 /100 WBCS — SIGNIFICANT CHANGE UP (ref 0–0)
PH UR: 5 — SIGNIFICANT CHANGE UP (ref 5–8)
PLATELET # BLD AUTO: 108 K/UL — LOW (ref 150–400)
PMV BLD: 12 FL — SIGNIFICANT CHANGE UP (ref 7–13)
POTASSIUM SERPL-MCNC: 3.9 MMOL/L — SIGNIFICANT CHANGE UP (ref 3.5–5.3)
POTASSIUM SERPL-SCNC: 3.9 MMOL/L — SIGNIFICANT CHANGE UP (ref 3.5–5.3)
PROT SERPL-MCNC: 5 G/DL — LOW (ref 6–8.3)
PROT UR-MCNC: 30 MG/DL
PROTHROM AB SERPL-ACNC: 13.7 SEC — HIGH (ref 9.9–13.4)
RBC # BLD: 3.08 M/UL — LOW (ref 4.2–5.8)
RBC # FLD: 14 % — SIGNIFICANT CHANGE UP (ref 10.3–14.5)
RBC CASTS # UR COMP ASSIST: 1 /HPF — SIGNIFICANT CHANGE UP (ref 0–4)
SODIUM SERPL-SCNC: 131 MMOL/L — LOW (ref 135–145)
SP GR SPEC: 1.02 — SIGNIFICANT CHANGE UP (ref 1–1.03)
SPECIMEN SOURCE: SIGNIFICANT CHANGE UP
SPECIMEN SOURCE: SIGNIFICANT CHANGE UP
SQUAMOUS # UR AUTO: 5 /HPF — SIGNIFICANT CHANGE UP (ref 0–5)
UROBILINOGEN FLD QL: 0.2 MG/DL — SIGNIFICANT CHANGE UP (ref 0.2–1)
WBC # BLD: 9.1 K/UL — SIGNIFICANT CHANGE UP (ref 3.8–10.5)
WBC # FLD AUTO: 9.1 K/UL — SIGNIFICANT CHANGE UP (ref 3.8–10.5)
WBC UR QL: 1 /HPF — SIGNIFICANT CHANGE UP (ref 0–5)

## 2025-07-18 PROCEDURE — 87086 URINE CULTURE/COLONY COUNT: CPT

## 2025-07-18 PROCEDURE — 85025 COMPLETE CBC W/AUTO DIFF WBC: CPT

## 2025-07-18 PROCEDURE — 85610 PROTHROMBIN TIME: CPT

## 2025-07-18 PROCEDURE — 71260 CT THORAX DX C+: CPT

## 2025-07-18 PROCEDURE — 70450 CT HEAD/BRAIN W/O DYE: CPT | Mod: 26

## 2025-07-18 PROCEDURE — 70450 CT HEAD/BRAIN W/O DYE: CPT

## 2025-07-18 PROCEDURE — 86900 BLOOD TYPING SEROLOGIC ABO: CPT

## 2025-07-18 PROCEDURE — 71260 CT THORAX DX C+: CPT | Mod: 26

## 2025-07-18 PROCEDURE — 80053 COMPREHEN METABOLIC PANEL: CPT

## 2025-07-18 PROCEDURE — 74177 CT ABD & PELVIS W/CONTRAST: CPT | Mod: 26

## 2025-07-18 PROCEDURE — 87040 BLOOD CULTURE FOR BACTERIA: CPT

## 2025-07-18 PROCEDURE — 74176 CT ABD & PELVIS W/O CONTRAST: CPT

## 2025-07-18 PROCEDURE — 86850 RBC ANTIBODY SCREEN: CPT

## 2025-07-18 PROCEDURE — 85730 THROMBOPLASTIN TIME PARTIAL: CPT

## 2025-07-18 PROCEDURE — 99221 1ST HOSP IP/OBS SF/LOW 40: CPT

## 2025-07-18 PROCEDURE — 74177 CT ABD & PELVIS W/CONTRAST: CPT

## 2025-07-18 PROCEDURE — 81001 URINALYSIS AUTO W/SCOPE: CPT

## 2025-07-18 PROCEDURE — 86901 BLOOD TYPING SEROLOGIC RH(D): CPT

## 2025-07-18 RX ORDER — PIPERACILLIN-TAZO-DEXTROSE,ISO 3.375G/5
3.38 IV SOLUTION, PIGGYBACK PREMIX FROZEN(ML) INTRAVENOUS EVERY 8 HOURS
Refills: 0 | Status: DISCONTINUED | OUTPATIENT
Start: 2025-07-19 | End: 2025-07-20

## 2025-07-18 RX ORDER — PIPERACILLIN-TAZO-DEXTROSE,ISO 3.375G/5
3.38 IV SOLUTION, PIGGYBACK PREMIX FROZEN(ML) INTRAVENOUS ONCE
Refills: 0 | Status: COMPLETED | OUTPATIENT
Start: 2025-07-18 | End: 2025-07-18

## 2025-07-18 RX ORDER — ACETAMINOPHEN 500 MG/5ML
975 LIQUID (ML) ORAL EVERY 6 HOURS
Refills: 0 | Status: DISCONTINUED | OUTPATIENT
Start: 2025-07-18 | End: 2025-07-18

## 2025-07-18 RX ORDER — PIPERACILLIN-TAZO-DEXTROSE,ISO 3.375G/5
3.38 IV SOLUTION, PIGGYBACK PREMIX FROZEN(ML) INTRAVENOUS ONCE
Refills: 0 | Status: DISCONTINUED | OUTPATIENT
Start: 2025-07-18 | End: 2025-07-18

## 2025-07-18 RX ORDER — ACETAMINOPHEN 500 MG/5ML
1000 LIQUID (ML) ORAL ONCE
Refills: 0 | Status: COMPLETED | OUTPATIENT
Start: 2025-07-18 | End: 2025-07-18

## 2025-07-18 RX ORDER — VANCOMYCIN HCL IN 5 % DEXTROSE 1.5G/250ML
1000 PLASTIC BAG, INJECTION (ML) INTRAVENOUS ONCE
Refills: 0 | Status: COMPLETED | OUTPATIENT
Start: 2025-07-18 | End: 2025-07-18

## 2025-07-18 RX ORDER — PIPERACILLIN-TAZO-DEXTROSE,ISO 3.375G/5
3.38 IV SOLUTION, PIGGYBACK PREMIX FROZEN(ML) INTRAVENOUS ONCE
Refills: 0 | Status: COMPLETED | OUTPATIENT
Start: 2025-07-19 | End: 2025-07-19

## 2025-07-18 RX ORDER — SODIUM CHLORIDE 9 G/1000ML
1000 INJECTION, SOLUTION INTRAVENOUS
Refills: 0 | Status: DISCONTINUED | OUTPATIENT
Start: 2025-07-18 | End: 2025-07-19

## 2025-07-18 RX ORDER — IOHEXOL 350 MG/ML
30 INJECTION, SOLUTION INTRAVENOUS ONCE
Refills: 0 | Status: DISCONTINUED | OUTPATIENT
Start: 2025-07-18 | End: 2025-07-18

## 2025-07-18 RX ADMIN — Medication 1000 MILLIGRAM(S): at 04:54

## 2025-07-18 RX ADMIN — Medication 200 GRAM(S): at 02:39

## 2025-07-18 RX ADMIN — Medication 25 GRAM(S): at 17:13

## 2025-07-18 RX ADMIN — Medication 200 GRAM(S): at 13:30

## 2025-07-18 RX ADMIN — Medication 1000 MILLILITER(S): at 13:30

## 2025-07-18 RX ADMIN — SODIUM CHLORIDE 100 MILLILITER(S): 9 INJECTION, SOLUTION INTRAVENOUS at 17:14

## 2025-07-18 RX ADMIN — Medication 250 MILLIGRAM(S): at 17:13

## 2025-07-18 RX ADMIN — Medication 400 MILLIGRAM(S): at 00:37

## 2025-07-18 RX ADMIN — Medication 2000 MILLILITER(S): at 01:14

## 2025-07-18 RX ADMIN — Medication 1000 MILLIGRAM(S): at 16:55

## 2025-07-18 RX ADMIN — Medication 250 MILLIGRAM(S): at 01:14

## 2025-07-18 RX ADMIN — Medication 400 MILLIGRAM(S): at 14:08

## 2025-07-19 DIAGNOSIS — C73 MALIGNANT NEOPLASM OF THYROID GLAND: ICD-10-CM

## 2025-07-19 DIAGNOSIS — Z79.899 OTHER LONG TERM (CURRENT) DRUG THERAPY: ICD-10-CM

## 2025-07-19 DIAGNOSIS — K29.80 DUODENITIS WITHOUT BLEEDING: ICD-10-CM

## 2025-07-19 DIAGNOSIS — D75.89 OTHER SPECIFIED DISEASES OF BLOOD AND BLOOD-FORMING ORGANS: ICD-10-CM

## 2025-07-19 DIAGNOSIS — E87.1 HYPO-OSMOLALITY AND HYPONATREMIA: ICD-10-CM

## 2025-07-19 DIAGNOSIS — R41.82 ALTERED MENTAL STATUS, UNSPECIFIED: ICD-10-CM

## 2025-07-19 DIAGNOSIS — R93.89 ABNORMAL FINDINGS ON DIAGNOSTIC IMAGING OF OTHER SPECIFIED BODY STRUCTURES: ICD-10-CM

## 2025-07-19 DIAGNOSIS — Z29.9 ENCOUNTER FOR PROPHYLACTIC MEASURES, UNSPECIFIED: ICD-10-CM

## 2025-07-19 DIAGNOSIS — G93.41 METABOLIC ENCEPHALOPATHY: ICD-10-CM

## 2025-07-19 DIAGNOSIS — D64.9 ANEMIA, UNSPECIFIED: ICD-10-CM

## 2025-07-19 DIAGNOSIS — R74.01 ELEVATION OF LEVELS OF LIVER TRANSAMINASE LEVELS: ICD-10-CM

## 2025-07-19 DIAGNOSIS — I10 ESSENTIAL (PRIMARY) HYPERTENSION: ICD-10-CM

## 2025-07-19 DIAGNOSIS — N28.9 DISORDER OF KIDNEY AND URETER, UNSPECIFIED: ICD-10-CM

## 2025-07-19 DIAGNOSIS — S70.12XA CONTUSION OF LEFT THIGH, INITIAL ENCOUNTER: ICD-10-CM

## 2025-07-19 DIAGNOSIS — R65.10 SYSTEMIC INFLAMMATORY RESPONSE SYNDROME (SIRS) OF NON-INFECTIOUS ORIGIN WITHOUT ACUTE ORGAN DYSFUNCTION: ICD-10-CM

## 2025-07-19 LAB
ALBUMIN SERPL ELPH-MCNC: 1.8 G/DL — LOW (ref 3.3–5)
ALBUMIN SERPL ELPH-MCNC: 1.9 G/DL — LOW (ref 3.3–5)
ALP SERPL-CCNC: 62 U/L — SIGNIFICANT CHANGE UP (ref 40–120)
ALP SERPL-CCNC: 73 U/L — SIGNIFICANT CHANGE UP (ref 40–120)
ALT FLD-CCNC: 39 U/L — SIGNIFICANT CHANGE UP (ref 10–45)
ALT FLD-CCNC: 48 U/L — HIGH (ref 10–45)
ANION GAP SERPL CALC-SCNC: 10 MMOL/L — SIGNIFICANT CHANGE UP (ref 5–17)
ANION GAP SERPL CALC-SCNC: 12 MMOL/L — SIGNIFICANT CHANGE UP (ref 5–17)
APTT BLD: 33.1 SEC — SIGNIFICANT CHANGE UP (ref 26.1–36.8)
AST SERPL-CCNC: 67 U/L — HIGH (ref 10–40)
AST SERPL-CCNC: 91 U/L — HIGH (ref 10–40)
BASOPHILS # BLD AUTO: 0.02 K/UL — SIGNIFICANT CHANGE UP (ref 0–0.2)
BASOPHILS # BLD AUTO: 0.02 K/UL — SIGNIFICANT CHANGE UP (ref 0–0.2)
BASOPHILS # BLD AUTO: 0.03 K/UL — SIGNIFICANT CHANGE UP (ref 0–0.2)
BASOPHILS # BLD AUTO: 0.06 K/UL — SIGNIFICANT CHANGE UP (ref 0–0.2)
BASOPHILS # BLD MANUAL: 0 K/UL — SIGNIFICANT CHANGE UP (ref 0–0.2)
BASOPHILS NFR BLD AUTO: 0.3 % — SIGNIFICANT CHANGE UP (ref 0–2)
BASOPHILS NFR BLD AUTO: 0.3 % — SIGNIFICANT CHANGE UP (ref 0–2)
BASOPHILS NFR BLD AUTO: 0.5 % — SIGNIFICANT CHANGE UP (ref 0–2)
BASOPHILS NFR BLD AUTO: 0.6 % — SIGNIFICANT CHANGE UP (ref 0–2)
BASOPHILS NFR BLD MANUAL: 0 % — SIGNIFICANT CHANGE UP (ref 0–2)
BILIRUB SERPL-MCNC: 0.4 MG/DL — SIGNIFICANT CHANGE UP (ref 0.2–1.2)
BILIRUB SERPL-MCNC: 0.5 MG/DL — SIGNIFICANT CHANGE UP (ref 0.2–1.2)
BLD GP AB SCN SERPL QL: NEGATIVE — SIGNIFICANT CHANGE UP
BUN SERPL-MCNC: 54 MG/DL — HIGH (ref 7–23)
BUN SERPL-MCNC: 61 MG/DL — HIGH (ref 7–23)
CALCIUM SERPL-MCNC: 7.1 MG/DL — LOW (ref 8.4–10.5)
CALCIUM SERPL-MCNC: 7.3 MG/DL — LOW (ref 8.4–10.5)
CHLORIDE SERPL-SCNC: 107 MMOL/L — SIGNIFICANT CHANGE UP (ref 96–108)
CHLORIDE SERPL-SCNC: 107 MMOL/L — SIGNIFICANT CHANGE UP (ref 96–108)
CO2 SERPL-SCNC: 16 MMOL/L — LOW (ref 22–31)
CO2 SERPL-SCNC: 19 MMOL/L — LOW (ref 22–31)
CREAT SERPL-MCNC: 1.62 MG/DL — HIGH (ref 0.5–1.3)
CREAT SERPL-MCNC: 1.62 MG/DL — HIGH (ref 0.5–1.3)
CULTURE RESULTS: NO GROWTH — SIGNIFICANT CHANGE UP
D DIMER BLD IA.RAPID-MCNC: 3817 NG/ML DDU — HIGH
EGFR: 44 ML/MIN/1.73M2 — LOW
EOSINOPHIL # BLD AUTO: 0.04 K/UL — SIGNIFICANT CHANGE UP (ref 0–0.5)
EOSINOPHIL # BLD AUTO: 0.1 K/UL — SIGNIFICANT CHANGE UP (ref 0–0.5)
EOSINOPHIL # BLD AUTO: 0.1 K/UL — SIGNIFICANT CHANGE UP (ref 0–0.5)
EOSINOPHIL # BLD AUTO: 0.16 K/UL — SIGNIFICANT CHANGE UP (ref 0–0.5)
EOSINOPHIL # BLD MANUAL: 0 K/UL — SIGNIFICANT CHANGE UP (ref 0–0.5)
EOSINOPHIL NFR BLD AUTO: 0.7 % — SIGNIFICANT CHANGE UP (ref 0–6)
EOSINOPHIL NFR BLD AUTO: 1.5 % — SIGNIFICANT CHANGE UP (ref 0–6)
EOSINOPHIL NFR BLD AUTO: 1.6 % — SIGNIFICANT CHANGE UP (ref 0–6)
EOSINOPHIL NFR BLD AUTO: 1.7 % — SIGNIFICANT CHANGE UP (ref 0–6)
EOSINOPHIL NFR BLD MANUAL: 0 % — SIGNIFICANT CHANGE UP (ref 0–6)
FERRITIN SERPL-MCNC: HIGH NG/ML (ref 30–400)
FIBRINOGEN PPP-MCNC: 117 MG/DL — LOW (ref 200–445)
GAS PNL BLDA: SIGNIFICANT CHANGE UP
GAS PNL BLDV: SIGNIFICANT CHANGE UP
GI PCR PANEL: DETECTED
GIANT PLATELETS BLD QL SMEAR: PRESENT
GLUCOSE BLDC GLUCOMTR-MCNC: 150 MG/DL — HIGH (ref 70–99)
GLUCOSE SERPL-MCNC: 122 MG/DL — HIGH (ref 70–99)
GLUCOSE SERPL-MCNC: 137 MG/DL — HIGH (ref 70–99)
HAPTOGLOB SERPL-MCNC: <20 MG/DL — LOW (ref 34–200)
HCT VFR BLD CALC: 15.9 % — CRITICAL LOW (ref 39–50)
HCT VFR BLD CALC: 17 % — CRITICAL LOW (ref 39–50)
HCT VFR BLD CALC: 18.6 % — CRITICAL LOW (ref 39–50)
HCT VFR BLD CALC: 20.4 % — CRITICAL LOW (ref 39–50)
HCT VFR BLD CALC: 24.1 % — LOW (ref 39–50)
HCT VFR BLD CALC: 25.7 % — LOW (ref 39–50)
HGB BLD-MCNC: 5.2 G/DL — CRITICAL LOW (ref 13–17)
HGB BLD-MCNC: 5.6 G/DL — CRITICAL LOW (ref 13–17)
HGB BLD-MCNC: 6.3 G/DL — CRITICAL LOW (ref 13–17)
HGB BLD-MCNC: 6.9 G/DL — CRITICAL LOW (ref 13–17)
HGB BLD-MCNC: 8 G/DL — LOW (ref 13–17)
HGB BLD-MCNC: 8.5 G/DL — LOW (ref 13–17)
IMM GRANULOCYTES # BLD AUTO: 0.35 K/UL — HIGH (ref 0–0.07)
IMM GRANULOCYTES # BLD AUTO: 0.37 K/UL — HIGH (ref 0–0.07)
IMM GRANULOCYTES # BLD AUTO: 0.41 K/UL — HIGH (ref 0–0.07)
IMM GRANULOCYTES # BLD AUTO: 0.44 K/UL — HIGH (ref 0–0.07)
IMM GRANULOCYTES NFR BLD AUTO: 3.9 % — HIGH (ref 0–0.9)
IMM GRANULOCYTES NFR BLD AUTO: 5.9 % — HIGH (ref 0–0.9)
IMM GRANULOCYTES NFR BLD AUTO: 6.4 % — HIGH (ref 0–0.9)
IMM GRANULOCYTES NFR BLD AUTO: 6.6 % — HIGH (ref 0–0.9)
IMMATURE PLATELET FRACTION #: 3.6 K/UL — LOW (ref 3.9–12.5)
IMMATURE PLATELET FRACTION #: 4.4 K/UL — SIGNIFICANT CHANGE UP (ref 3.9–12.5)
IMMATURE PLATELET FRACTION #: 4.6 K/UL — SIGNIFICANT CHANGE UP (ref 3.9–12.5)
IMMATURE PLATELET FRACTION #: 4.7 K/UL — SIGNIFICANT CHANGE UP (ref 3.9–12.5)
IMMATURE PLATELET FRACTION #: 4.7 K/UL — SIGNIFICANT CHANGE UP (ref 3.9–12.5)
IMMATURE PLATELET FRACTION %: 4.8 % — SIGNIFICANT CHANGE UP (ref 1.6–7.1)
IMMATURE PLATELET FRACTION %: 5 % — SIGNIFICANT CHANGE UP (ref 1.6–7.1)
IMMATURE PLATELET FRACTION %: 5.9 % — SIGNIFICANT CHANGE UP (ref 1.6–7.1)
IMMATURE PLATELET FRACTION %: 6.3 % — SIGNIFICANT CHANGE UP (ref 1.6–7.1)
IMMATURE PLATELET FRACTION %: 6.6 % — SIGNIFICANT CHANGE UP (ref 1.6–7.1)
IMMATURE RETICULOCYTE FRACTION %: 7.2 % — SIGNIFICANT CHANGE UP
INR BLD: 1.59 RATIO — HIGH (ref 0.85–1.16)
LDH SERPL L TO P-CCNC: 654 U/L — HIGH (ref 50–242)
LYMPHOCYTES # BLD AUTO: 0.75 K/UL — LOW (ref 1–3.3)
LYMPHOCYTES # BLD AUTO: 0.86 K/UL — LOW (ref 1–3.3)
LYMPHOCYTES # BLD AUTO: 0.87 K/UL — LOW (ref 1–3.3)
LYMPHOCYTES # BLD AUTO: 1.47 K/UL — SIGNIFICANT CHANGE UP (ref 1–3.3)
LYMPHOCYTES # BLD MANUAL: 0.78 K/UL — LOW (ref 1–3.3)
LYMPHOCYTES NFR BLD AUTO: 12.7 % — LOW (ref 13–44)
LYMPHOCYTES NFR BLD AUTO: 13 % — SIGNIFICANT CHANGE UP (ref 13–44)
LYMPHOCYTES NFR BLD AUTO: 13.4 % — SIGNIFICANT CHANGE UP (ref 13–44)
LYMPHOCYTES NFR BLD AUTO: 15.4 % — SIGNIFICANT CHANGE UP (ref 13–44)
LYMPHOCYTES NFR BLD MANUAL: 13.2 % — SIGNIFICANT CHANGE UP (ref 13–44)
MAGNESIUM SERPL-MCNC: 1.9 MG/DL — SIGNIFICANT CHANGE UP (ref 1.6–2.6)
MAGNESIUM SERPL-MCNC: 1.9 MG/DL — SIGNIFICANT CHANGE UP (ref 1.6–2.6)
MAGNESIUM SERPL-MCNC: 2 MG/DL — SIGNIFICANT CHANGE UP (ref 1.6–2.6)
MANUAL NEUTROPHIL BANDS #: 0.73 K/UL — SIGNIFICANT CHANGE UP (ref 0–0.84)
MCHC RBC-ENTMCNC: 28.6 PG — SIGNIFICANT CHANGE UP (ref 27–34)
MCHC RBC-ENTMCNC: 29.2 PG — SIGNIFICANT CHANGE UP (ref 27–34)
MCHC RBC-ENTMCNC: 29.3 PG — SIGNIFICANT CHANGE UP (ref 27–34)
MCHC RBC-ENTMCNC: 29.7 PG — SIGNIFICANT CHANGE UP (ref 27–34)
MCHC RBC-ENTMCNC: 30.1 PG — SIGNIFICANT CHANGE UP (ref 27–34)
MCHC RBC-ENTMCNC: 30.3 PG — SIGNIFICANT CHANGE UP (ref 27–34)
MCHC RBC-ENTMCNC: 32.7 G/DL — SIGNIFICANT CHANGE UP (ref 32–36)
MCHC RBC-ENTMCNC: 32.9 G/DL — SIGNIFICANT CHANGE UP (ref 32–36)
MCHC RBC-ENTMCNC: 33.1 G/DL — SIGNIFICANT CHANGE UP (ref 32–36)
MCHC RBC-ENTMCNC: 33.2 G/DL — SIGNIFICANT CHANGE UP (ref 32–36)
MCHC RBC-ENTMCNC: 33.8 G/DL — SIGNIFICANT CHANGE UP (ref 32–36)
MCHC RBC-ENTMCNC: 33.9 G/DL — SIGNIFICANT CHANGE UP (ref 32–36)
MCV RBC AUTO: 86.1 FL — SIGNIFICANT CHANGE UP (ref 80–100)
MCV RBC AUTO: 89 FL — SIGNIFICANT CHANGE UP (ref 80–100)
MCV RBC AUTO: 89 FL — SIGNIFICANT CHANGE UP (ref 80–100)
MCV RBC AUTO: 89.3 FL — SIGNIFICANT CHANGE UP (ref 80–100)
MCV RBC AUTO: 89.5 FL — SIGNIFICANT CHANGE UP (ref 80–100)
MCV RBC AUTO: 89.9 FL — SIGNIFICANT CHANGE UP (ref 80–100)
MONOCYTES # BLD AUTO: 0.31 K/UL — SIGNIFICANT CHANGE UP (ref 0–0.9)
MONOCYTES # BLD AUTO: 0.32 K/UL — SIGNIFICANT CHANGE UP (ref 0–0.9)
MONOCYTES # BLD AUTO: 0.38 K/UL — SIGNIFICANT CHANGE UP (ref 0–0.9)
MONOCYTES # BLD AUTO: 0.71 K/UL — SIGNIFICANT CHANGE UP (ref 0–0.9)
MONOCYTES # BLD MANUAL: 0.15 K/UL — SIGNIFICANT CHANGE UP (ref 0–0.9)
MONOCYTES NFR BLD AUTO: 4.8 % — SIGNIFICANT CHANGE UP (ref 2–14)
MONOCYTES NFR BLD AUTO: 5.3 % — SIGNIFICANT CHANGE UP (ref 2–14)
MONOCYTES NFR BLD AUTO: 5.9 % — SIGNIFICANT CHANGE UP (ref 2–14)
MONOCYTES NFR BLD AUTO: 7.5 % — SIGNIFICANT CHANGE UP (ref 2–14)
MONOCYTES NFR BLD MANUAL: 2.5 % — SIGNIFICANT CHANGE UP (ref 2–14)
MRSA PCR RESULT.: SIGNIFICANT CHANGE UP
NEUTROPHILS # BLD AUTO: 4.41 K/UL — SIGNIFICANT CHANGE UP (ref 1.8–7.4)
NEUTROPHILS # BLD AUTO: 4.66 K/UL — SIGNIFICANT CHANGE UP (ref 1.8–7.4)
NEUTROPHILS # BLD AUTO: 4.94 K/UL — SIGNIFICANT CHANGE UP (ref 1.8–7.4)
NEUTROPHILS # BLD AUTO: 6.76 K/UL — SIGNIFICANT CHANGE UP (ref 1.8–7.4)
NEUTROPHILS # BLD MANUAL: 4.23 K/UL — SIGNIFICANT CHANGE UP (ref 1.8–7.4)
NEUTROPHILS NFR BLD AUTO: 70.9 % — SIGNIFICANT CHANGE UP (ref 43–77)
NEUTROPHILS NFR BLD AUTO: 72.4 % — SIGNIFICANT CHANGE UP (ref 43–77)
NEUTROPHILS NFR BLD AUTO: 73.8 % — SIGNIFICANT CHANGE UP (ref 43–77)
NEUTROPHILS NFR BLD AUTO: 74.9 % — SIGNIFICANT CHANGE UP (ref 43–77)
NEUTROPHILS NFR BLD MANUAL: 71.9 % — SIGNIFICANT CHANGE UP (ref 43–77)
NEUTS BAND # BLD: 12.4 % — HIGH (ref 0–8)
NEUTS BAND NFR BLD: 12.4 % — HIGH (ref 0–8)
NRBC # BLD AUTO: 0 K/UL — SIGNIFICANT CHANGE UP (ref 0–0)
NRBC # BLD AUTO: 0.02 K/UL — HIGH (ref 0–0)
NRBC # FLD: 0 K/UL — SIGNIFICANT CHANGE UP (ref 0–0)
NRBC # FLD: 0.02 K/UL — HIGH (ref 0–0)
NRBC BLD AUTO-RTO: 0 /100 WBCS — SIGNIFICANT CHANGE UP (ref 0–0)
PHOSPHATE SERPL-MCNC: 3.5 MG/DL — SIGNIFICANT CHANGE UP (ref 2.5–4.5)
PHOSPHATE SERPL-MCNC: 3.5 MG/DL — SIGNIFICANT CHANGE UP (ref 2.5–4.5)
PHOSPHATE SERPL-MCNC: 3.8 MG/DL — SIGNIFICANT CHANGE UP (ref 2.5–4.5)
PLAT MORPH BLD: NORMAL — SIGNIFICANT CHANGE UP
PLATELET # BLD AUTO: 102 K/UL — LOW (ref 150–400)
PLATELET # BLD AUTO: 70 K/UL — LOW (ref 150–400)
PLATELET # BLD AUTO: 74 K/UL — LOW (ref 150–400)
PLATELET # BLD AUTO: 75 K/UL — LOW (ref 150–400)
PLATELET # BLD AUTO: 75 K/UL — LOW (ref 150–400)
PLATELET # BLD AUTO: 93 K/UL — LOW (ref 150–400)
PMV BLD: 12.2 FL — SIGNIFICANT CHANGE UP (ref 7–13)
PMV BLD: 12.4 FL — SIGNIFICANT CHANGE UP (ref 7–13)
PMV BLD: 12.6 FL — SIGNIFICANT CHANGE UP (ref 7–13)
PMV BLD: 12.7 FL — SIGNIFICANT CHANGE UP (ref 7–13)
PMV BLD: 12.9 FL — SIGNIFICANT CHANGE UP (ref 7–13)
PMV BLD: 12.9 FL — SIGNIFICANT CHANGE UP (ref 7–13)
POTASSIUM SERPL-MCNC: 3.6 MMOL/L — SIGNIFICANT CHANGE UP (ref 3.5–5.3)
POTASSIUM SERPL-MCNC: 3.8 MMOL/L — SIGNIFICANT CHANGE UP (ref 3.5–5.3)
POTASSIUM SERPL-SCNC: 3.6 MMOL/L — SIGNIFICANT CHANGE UP (ref 3.5–5.3)
POTASSIUM SERPL-SCNC: 3.8 MMOL/L — SIGNIFICANT CHANGE UP (ref 3.5–5.3)
PROT SERPL-MCNC: 3 G/DL — LOW (ref 6–8.3)
PROT SERPL-MCNC: 3.4 G/DL — LOW (ref 6–8.3)
PROTHROM AB SERPL-ACNC: 18.2 SEC — HIGH (ref 9.9–13.4)
RBC # BLD: 1.78 M/UL — LOW (ref 4.2–5.8)
RBC # BLD: 1.91 M/UL — LOW (ref 4.2–5.8)
RBC # BLD: 2.09 M/UL — LOW (ref 4.2–5.8)
RBC # BLD: 2.28 M/UL — LOW (ref 4.2–5.8)
RBC # BLD: 2.28 M/UL — LOW (ref 4.2–5.8)
RBC # BLD: 2.8 M/UL — LOW (ref 4.2–5.8)
RBC # BLD: 2.86 M/UL — LOW (ref 4.2–5.8)
RBC # FLD: 13.6 % — SIGNIFICANT CHANGE UP (ref 10.3–14.5)
RBC # FLD: 13.7 % — SIGNIFICANT CHANGE UP (ref 10.3–14.5)
RBC # FLD: 13.9 % — SIGNIFICANT CHANGE UP (ref 10.3–14.5)
RBC # FLD: 14.3 % — SIGNIFICANT CHANGE UP (ref 10.3–14.5)
RBC # FLD: 14.6 % — HIGH (ref 10.3–14.5)
RBC # FLD: 14.7 % — HIGH (ref 10.3–14.5)
RBC BLD AUTO: SIGNIFICANT CHANGE UP
RETICS #: 43.3 K/UL — SIGNIFICANT CHANGE UP (ref 25–125)
RETICS/RBC NFR: 1.9 % — SIGNIFICANT CHANGE UP (ref 0.5–2.5)
RETICULOCYTE HEMOGLOBIN EQUIVALENT: 28.2 PG — LOW (ref 36–38.6)
RH IG SCN BLD-IMP: NEGATIVE — SIGNIFICANT CHANGE UP
S AUREUS DNA NOSE QL NAA+PROBE: SIGNIFICANT CHANGE UP
SHIGELLA DNA SPEC QL NAA+PROBE: DETECTED
SODIUM SERPL-SCNC: 135 MMOL/L — SIGNIFICANT CHANGE UP (ref 135–145)
SODIUM SERPL-SCNC: 136 MMOL/L — SIGNIFICANT CHANGE UP (ref 135–145)
SPECIMEN SOURCE: SIGNIFICANT CHANGE UP
TRIGL SERPL-MCNC: 137 MG/DL — SIGNIFICANT CHANGE UP
WBC # BLD: 10.41 K/UL — SIGNIFICANT CHANGE UP (ref 3.8–10.5)
WBC # BLD: 5.89 K/UL — SIGNIFICANT CHANGE UP (ref 3.8–10.5)
WBC # BLD: 6.31 K/UL — SIGNIFICANT CHANGE UP (ref 3.8–10.5)
WBC # BLD: 6.43 K/UL — SIGNIFICANT CHANGE UP (ref 3.8–10.5)
WBC # BLD: 6.69 K/UL — SIGNIFICANT CHANGE UP (ref 3.8–10.5)
WBC # BLD: 9.53 K/UL — SIGNIFICANT CHANGE UP (ref 3.8–10.5)
WBC # FLD AUTO: 10.41 K/UL — SIGNIFICANT CHANGE UP (ref 3.8–10.5)
WBC # FLD AUTO: 5.89 K/UL — SIGNIFICANT CHANGE UP (ref 3.8–10.5)
WBC # FLD AUTO: 6.31 K/UL — SIGNIFICANT CHANGE UP (ref 3.8–10.5)
WBC # FLD AUTO: 6.43 K/UL — SIGNIFICANT CHANGE UP (ref 3.8–10.5)
WBC # FLD AUTO: 6.69 K/UL — SIGNIFICANT CHANGE UP (ref 3.8–10.5)
WBC # FLD AUTO: 9.53 K/UL — SIGNIFICANT CHANGE UP (ref 3.8–10.5)

## 2025-07-19 PROCEDURE — 84100 ASSAY OF PHOSPHORUS: CPT

## 2025-07-19 PROCEDURE — 82330 ASSAY OF CALCIUM: CPT

## 2025-07-19 PROCEDURE — 87086 URINE CULTURE/COLONY COUNT: CPT

## 2025-07-19 PROCEDURE — 74177 CT ABD & PELVIS W/CONTRAST: CPT | Mod: 26

## 2025-07-19 PROCEDURE — 82728 ASSAY OF FERRITIN: CPT

## 2025-07-19 PROCEDURE — 85379 FIBRIN DEGRADATION QUANT: CPT

## 2025-07-19 PROCEDURE — 99222 1ST HOSP IP/OBS MODERATE 55: CPT | Mod: GC,25

## 2025-07-19 PROCEDURE — 36246 INS CATH ABD/L-EXT ART 2ND: CPT | Mod: XS,RT

## 2025-07-19 PROCEDURE — 87641 MR-STAPH DNA AMP PROBE: CPT

## 2025-07-19 PROCEDURE — 85018 HEMOGLOBIN: CPT

## 2025-07-19 PROCEDURE — 85014 HEMATOCRIT: CPT

## 2025-07-19 PROCEDURE — 70450 CT HEAD/BRAIN W/O DYE: CPT

## 2025-07-19 PROCEDURE — 36247 INS CATH ABD/L-EXT ART 3RD: CPT | Mod: RT

## 2025-07-19 PROCEDURE — 85730 THROMBOPLASTIN TIME PARTIAL: CPT

## 2025-07-19 PROCEDURE — 86850 RBC ANTIBODY SCREEN: CPT

## 2025-07-19 PROCEDURE — 81001 URINALYSIS AUTO W/SCOPE: CPT

## 2025-07-19 PROCEDURE — 87507 IADNA-DNA/RNA PROBE TQ 12-25: CPT

## 2025-07-19 PROCEDURE — 74177 CT ABD & PELVIS W/CONTRAST: CPT

## 2025-07-19 PROCEDURE — 37244 VASC EMBOLIZE/OCCLUDE BLEED: CPT

## 2025-07-19 PROCEDURE — 82947 ASSAY GLUCOSE BLOOD QUANT: CPT

## 2025-07-19 PROCEDURE — 83615 LACTATE (LD) (LDH) ENZYME: CPT

## 2025-07-19 PROCEDURE — 74176 CT ABD & PELVIS W/O CONTRAST: CPT

## 2025-07-19 PROCEDURE — 80053 COMPREHEN METABOLIC PANEL: CPT

## 2025-07-19 PROCEDURE — 94002 VENT MGMT INPAT INIT DAY: CPT

## 2025-07-19 PROCEDURE — 87040 BLOOD CULTURE FOR BACTERIA: CPT

## 2025-07-19 PROCEDURE — 87045 FECES CULTURE AEROBIC BACT: CPT

## 2025-07-19 PROCEDURE — 84132 ASSAY OF SERUM POTASSIUM: CPT

## 2025-07-19 PROCEDURE — 86900 BLOOD TYPING SEROLOGIC ABO: CPT

## 2025-07-19 PROCEDURE — 85384 FIBRINOGEN ACTIVITY: CPT

## 2025-07-19 PROCEDURE — 86923 COMPATIBILITY TEST ELECTRIC: CPT

## 2025-07-19 PROCEDURE — 82803 BLOOD GASES ANY COMBINATION: CPT

## 2025-07-19 PROCEDURE — 99223 1ST HOSP IP/OBS HIGH 75: CPT | Mod: GC

## 2025-07-19 PROCEDURE — 85045 AUTOMATED RETICULOCYTE COUNT: CPT

## 2025-07-19 PROCEDURE — 85610 PROTHROMBIN TIME: CPT

## 2025-07-19 PROCEDURE — 76937 US GUIDE VASCULAR ACCESS: CPT | Mod: 26

## 2025-07-19 PROCEDURE — 83010 ASSAY OF HAPTOGLOBIN QUANT: CPT

## 2025-07-19 PROCEDURE — 87640 STAPH A DNA AMP PROBE: CPT

## 2025-07-19 PROCEDURE — 83735 ASSAY OF MAGNESIUM: CPT

## 2025-07-19 PROCEDURE — 82435 ASSAY OF BLOOD CHLORIDE: CPT

## 2025-07-19 PROCEDURE — 83605 ASSAY OF LACTIC ACID: CPT

## 2025-07-19 PROCEDURE — 71045 X-RAY EXAM CHEST 1 VIEW: CPT

## 2025-07-19 PROCEDURE — 31500 INSERT EMERGENCY AIRWAY: CPT | Mod: GC

## 2025-07-19 PROCEDURE — 43255 EGD CONTROL BLEEDING ANY: CPT | Mod: GC

## 2025-07-19 PROCEDURE — 84478 ASSAY OF TRIGLYCERIDES: CPT

## 2025-07-19 PROCEDURE — 85025 COMPLETE CBC W/AUTO DIFF WBC: CPT

## 2025-07-19 PROCEDURE — 84295 ASSAY OF SERUM SODIUM: CPT

## 2025-07-19 PROCEDURE — 86901 BLOOD TYPING SEROLOGIC RH(D): CPT

## 2025-07-19 PROCEDURE — 85027 COMPLETE CBC AUTOMATED: CPT

## 2025-07-19 PROCEDURE — 82962 GLUCOSE BLOOD TEST: CPT

## 2025-07-19 PROCEDURE — 71260 CT THORAX DX C+: CPT

## 2025-07-19 PROCEDURE — 71045 X-RAY EXAM CHEST 1 VIEW: CPT | Mod: 26

## 2025-07-19 PROCEDURE — 99291 CRITICAL CARE FIRST HOUR: CPT

## 2025-07-19 DEVICE — SYS NEXPOWDER HEMOSTATIC: Type: IMPLANTABLE DEVICE | Status: FUNCTIONAL

## 2025-07-19 RX ORDER — DABRAFENIB 50 MG/1
2 CAPSULE ORAL
Refills: 0 | DISCHARGE

## 2025-07-19 RX ORDER — CALCIUM GLUCONATE 20 MG/ML
2 INJECTION, SOLUTION INTRAVENOUS ONCE
Refills: 0 | Status: DISCONTINUED | OUTPATIENT
Start: 2025-07-19 | End: 2025-07-20

## 2025-07-19 RX ORDER — TRAMETINIB 0.05 MG/ML
1 POWDER, FOR SOLUTION ORAL
Refills: 0 | DISCHARGE

## 2025-07-19 RX ORDER — SODIUM CHLORIDE 9 G/1000ML
1000 INJECTION, SOLUTION INTRAVENOUS
Refills: 0 | Status: DISCONTINUED | OUTPATIENT
Start: 2025-07-19 | End: 2025-07-19

## 2025-07-19 RX ORDER — NOREPINEPHRINE BITARTRATE 8 MG
0.05 SOLUTION INTRAVENOUS
Qty: 8 | Refills: 0 | Status: DISCONTINUED | OUTPATIENT
Start: 2025-07-19 | End: 2025-07-20

## 2025-07-19 RX ORDER — AMLODIPINE BESYLATE 10 MG/1
1 TABLET ORAL
Refills: 0 | DISCHARGE

## 2025-07-19 RX ORDER — ACETAMINOPHEN 500 MG/5ML
650 LIQUID (ML) ORAL EVERY 6 HOURS
Refills: 0 | Status: DISCONTINUED | OUTPATIENT
Start: 2025-07-19 | End: 2025-07-28

## 2025-07-19 RX ORDER — ERYTHROMYCIN ETHYLSUCCINATE 200 MG/5ML
250 SUSPENSION, RECONSTITUTED, ORAL (ML) ORAL ONCE
Refills: 0 | Status: COMPLETED | OUTPATIENT
Start: 2025-07-19 | End: 2025-07-19

## 2025-07-19 RX ORDER — PROPOFOL 10 MG/ML
25 INJECTION, EMULSION INTRAVENOUS
Qty: 1000 | Refills: 0 | Status: DISCONTINUED | OUTPATIENT
Start: 2025-07-19 | End: 2025-07-20

## 2025-07-19 RX ORDER — CALCIUM CHLORIDE 100 MG/ML
1 INJECTION, SOLUTION INTRAVENOUS ONCE
Refills: 0 | Status: DISCONTINUED | OUTPATIENT
Start: 2025-07-19 | End: 2025-07-19

## 2025-07-19 RX ORDER — ACETAMINOPHEN 500 MG/5ML
650 LIQUID (ML) ORAL EVERY 6 HOURS
Refills: 0 | Status: DISCONTINUED | OUTPATIENT
Start: 2025-07-19 | End: 2025-07-19

## 2025-07-19 RX ORDER — ACETAMINOPHEN 500 MG/5ML
1000 LIQUID (ML) ORAL ONCE
Refills: 0 | Status: DISCONTINUED | OUTPATIENT
Start: 2025-07-19 | End: 2025-07-20

## 2025-07-19 RX ORDER — LOSARTAN POTASSIUM 100 MG/1
1 TABLET, FILM COATED ORAL
Refills: 0 | DISCHARGE

## 2025-07-19 RX ORDER — AMLODIPINE BESYLATE 10 MG/1
10 TABLET ORAL DAILY
Refills: 0 | Status: DISCONTINUED | OUTPATIENT
Start: 2025-07-19 | End: 2025-07-19

## 2025-07-19 RX ORDER — LOSARTAN POTASSIUM 100 MG/1
100 TABLET, FILM COATED ORAL DAILY
Refills: 0 | Status: DISCONTINUED | OUTPATIENT
Start: 2025-07-19 | End: 2025-07-19

## 2025-07-19 RX ORDER — SODIUM CHLORIDE 9 G/1000ML
1000 INJECTION, SOLUTION INTRAVENOUS ONCE
Refills: 0 | Status: COMPLETED | OUTPATIENT
Start: 2025-07-19 | End: 2025-07-19

## 2025-07-19 RX ADMIN — SODIUM CHLORIDE 6000 MILLILITER(S): 9 INJECTION, SOLUTION INTRAVENOUS at 19:50

## 2025-07-19 RX ADMIN — Medication 1 APPLICATION(S): at 17:42

## 2025-07-19 RX ADMIN — PROPOFOL 10.2 MICROGRAM(S)/KG/MIN: 10 INJECTION, EMULSION INTRAVENOUS at 19:51

## 2025-07-19 RX ADMIN — Medication 15 MILLILITER(S): at 17:48

## 2025-07-19 RX ADMIN — Medication 250 MILLIGRAM(S): at 17:42

## 2025-07-19 RX ADMIN — Medication 25 GRAM(S): at 15:47

## 2025-07-19 RX ADMIN — Medication 25 GRAM(S): at 00:05

## 2025-07-19 RX ADMIN — NOREPINEPHRINE BITARTRATE 6.36 MICROGRAM(S)/KG/MIN: 8 SOLUTION at 19:50

## 2025-07-19 RX ADMIN — Medication 40 MILLIGRAM(S): at 17:42

## 2025-07-19 RX ADMIN — Medication 25 GRAM(S): at 05:24

## 2025-07-20 LAB
ALBUMIN SERPL ELPH-MCNC: 2.3 G/DL — LOW (ref 3.3–5)
ALBUMIN SERPL ELPH-MCNC: 2.4 G/DL — LOW (ref 3.3–5)
ALP SERPL-CCNC: 60 U/L — SIGNIFICANT CHANGE UP (ref 40–120)
ALP SERPL-CCNC: 62 U/L — SIGNIFICANT CHANGE UP (ref 40–120)
ALT FLD-CCNC: 30 U/L — SIGNIFICANT CHANGE UP (ref 10–45)
ALT FLD-CCNC: 30 U/L — SIGNIFICANT CHANGE UP (ref 10–45)
ANION GAP SERPL CALC-SCNC: 11 MMOL/L — SIGNIFICANT CHANGE UP (ref 5–17)
ANION GAP SERPL CALC-SCNC: 11 MMOL/L — SIGNIFICANT CHANGE UP (ref 5–17)
APTT BLD: 27.5 SEC — SIGNIFICANT CHANGE UP (ref 26.1–36.8)
AST SERPL-CCNC: 33 U/L — SIGNIFICANT CHANGE UP (ref 10–40)
AST SERPL-CCNC: 39 U/L — SIGNIFICANT CHANGE UP (ref 10–40)
BASOPHILS # BLD AUTO: 0.02 K/UL — SIGNIFICANT CHANGE UP (ref 0–0.2)
BASOPHILS # BLD AUTO: 0.02 K/UL — SIGNIFICANT CHANGE UP (ref 0–0.2)
BASOPHILS # BLD AUTO: 0.04 K/UL — SIGNIFICANT CHANGE UP (ref 0–0.2)
BASOPHILS NFR BLD AUTO: 0.3 % — SIGNIFICANT CHANGE UP (ref 0–2)
BASOPHILS NFR BLD AUTO: 0.3 % — SIGNIFICANT CHANGE UP (ref 0–2)
BASOPHILS NFR BLD AUTO: 0.6 % — SIGNIFICANT CHANGE UP (ref 0–2)
BILIRUB SERPL-MCNC: 0.6 MG/DL — SIGNIFICANT CHANGE UP (ref 0.2–1.2)
BILIRUB SERPL-MCNC: 0.7 MG/DL — SIGNIFICANT CHANGE UP (ref 0.2–1.2)
BUN SERPL-MCNC: 46 MG/DL — HIGH (ref 7–23)
BUN SERPL-MCNC: 53 MG/DL — HIGH (ref 7–23)
CALCIUM SERPL-MCNC: 8.4 MG/DL — SIGNIFICANT CHANGE UP (ref 8.4–10.5)
CALCIUM SERPL-MCNC: 8.6 MG/DL — SIGNIFICANT CHANGE UP (ref 8.4–10.5)
CHLORIDE SERPL-SCNC: 109 MMOL/L — HIGH (ref 96–108)
CHLORIDE SERPL-SCNC: 110 MMOL/L — HIGH (ref 96–108)
CO2 SERPL-SCNC: 17 MMOL/L — LOW (ref 22–31)
CO2 SERPL-SCNC: 18 MMOL/L — LOW (ref 22–31)
CREAT SERPL-MCNC: 1.35 MG/DL — HIGH (ref 0.5–1.3)
CREAT SERPL-MCNC: 1.37 MG/DL — HIGH (ref 0.5–1.3)
CULTURE RESULTS: SIGNIFICANT CHANGE UP
EGFR: 54 ML/MIN/1.73M2 — LOW
EGFR: 54 ML/MIN/1.73M2 — LOW
EGFR: 55 ML/MIN/1.73M2 — LOW
EGFR: 55 ML/MIN/1.73M2 — LOW
EOSINOPHIL # BLD AUTO: 0.25 K/UL — SIGNIFICANT CHANGE UP (ref 0–0.5)
EOSINOPHIL # BLD AUTO: 0.29 K/UL — SIGNIFICANT CHANGE UP (ref 0–0.5)
EOSINOPHIL # BLD AUTO: 0.38 K/UL — SIGNIFICANT CHANGE UP (ref 0–0.5)
EOSINOPHIL NFR BLD AUTO: 3.8 % — SIGNIFICANT CHANGE UP (ref 0–6)
EOSINOPHIL NFR BLD AUTO: 4.1 % — SIGNIFICANT CHANGE UP (ref 0–6)
EOSINOPHIL NFR BLD AUTO: 5.1 % — SIGNIFICANT CHANGE UP (ref 0–6)
FOLATE SERPL-MCNC: 7.5 NG/ML — SIGNIFICANT CHANGE UP
GAS PNL BLDA: SIGNIFICANT CHANGE UP
GAS PNL BLDA: SIGNIFICANT CHANGE UP
GLUCOSE SERPL-MCNC: 113 MG/DL — HIGH (ref 70–99)
GLUCOSE SERPL-MCNC: 132 MG/DL — HIGH (ref 70–99)
HCT VFR BLD CALC: 27.4 % — LOW (ref 39–50)
HCT VFR BLD CALC: 27.8 % — LOW (ref 39–50)
HCT VFR BLD CALC: 28.3 % — LOW (ref 39–50)
HGB BLD-MCNC: 9.3 G/DL — LOW (ref 13–17)
HGB BLD-MCNC: 9.5 G/DL — LOW (ref 13–17)
HGB BLD-MCNC: 9.9 G/DL — LOW (ref 13–17)
IMM GRANULOCYTES # BLD AUTO: 0.1 K/UL — HIGH (ref 0–0.07)
IMM GRANULOCYTES # BLD AUTO: 0.1 K/UL — HIGH (ref 0–0.07)
IMM GRANULOCYTES # BLD AUTO: 0.12 K/UL — HIGH (ref 0–0.07)
IMM GRANULOCYTES NFR BLD AUTO: 1.4 % — HIGH (ref 0–0.9)
IMM GRANULOCYTES NFR BLD AUTO: 1.5 % — HIGH (ref 0–0.9)
IMM GRANULOCYTES NFR BLD AUTO: 1.6 % — HIGH (ref 0–0.9)
IMMATURE PLATELET FRACTION #: 4.2 K/UL — SIGNIFICANT CHANGE UP (ref 3.9–12.5)
IMMATURE PLATELET FRACTION #: 4.9 K/UL — SIGNIFICANT CHANGE UP (ref 3.9–12.5)
IMMATURE PLATELET FRACTION %: 5.1 % — SIGNIFICANT CHANGE UP (ref 1.6–7.1)
IMMATURE PLATELET FRACTION %: 5.2 % — SIGNIFICANT CHANGE UP (ref 1.6–7.1)
INR BLD: 1.16 RATIO — SIGNIFICANT CHANGE UP (ref 0.85–1.16)
LYMPHOCYTES # BLD AUTO: 0.94 K/UL — LOW (ref 1–3.3)
LYMPHOCYTES # BLD AUTO: 0.96 K/UL — LOW (ref 1–3.3)
LYMPHOCYTES # BLD AUTO: 1.06 K/UL — SIGNIFICANT CHANGE UP (ref 1–3.3)
LYMPHOCYTES NFR BLD AUTO: 13.6 % — SIGNIFICANT CHANGE UP (ref 13–44)
LYMPHOCYTES NFR BLD AUTO: 14.1 % — SIGNIFICANT CHANGE UP (ref 13–44)
LYMPHOCYTES NFR BLD AUTO: 14.4 % — SIGNIFICANT CHANGE UP (ref 13–44)
MAGNESIUM SERPL-MCNC: 1.9 MG/DL — SIGNIFICANT CHANGE UP (ref 1.6–2.6)
MAGNESIUM SERPL-MCNC: 2.1 MG/DL — SIGNIFICANT CHANGE UP (ref 1.6–2.6)
MCHC RBC-ENTMCNC: 29 PG — SIGNIFICANT CHANGE UP (ref 27–34)
MCHC RBC-ENTMCNC: 29.6 PG — SIGNIFICANT CHANGE UP (ref 27–34)
MCHC RBC-ENTMCNC: 29.7 PG — SIGNIFICANT CHANGE UP (ref 27–34)
MCHC RBC-ENTMCNC: 33.5 G/DL — SIGNIFICANT CHANGE UP (ref 32–36)
MCHC RBC-ENTMCNC: 34.7 G/DL — SIGNIFICANT CHANGE UP (ref 32–36)
MCHC RBC-ENTMCNC: 35 G/DL — SIGNIFICANT CHANGE UP (ref 32–36)
MCV RBC AUTO: 85 FL — SIGNIFICANT CHANGE UP (ref 80–100)
MCV RBC AUTO: 85.4 FL — SIGNIFICANT CHANGE UP (ref 80–100)
MCV RBC AUTO: 86.6 FL — SIGNIFICANT CHANGE UP (ref 80–100)
MONOCYTES # BLD AUTO: 0.43 K/UL — SIGNIFICANT CHANGE UP (ref 0–0.9)
MONOCYTES # BLD AUTO: 0.6 K/UL — SIGNIFICANT CHANGE UP (ref 0–0.9)
MONOCYTES # BLD AUTO: 0.62 K/UL — SIGNIFICANT CHANGE UP (ref 0–0.9)
MONOCYTES NFR BLD AUTO: 6.6 % — SIGNIFICANT CHANGE UP (ref 2–14)
MONOCYTES NFR BLD AUTO: 8.3 % — SIGNIFICANT CHANGE UP (ref 2–14)
MONOCYTES NFR BLD AUTO: 8.5 % — SIGNIFICANT CHANGE UP (ref 2–14)
NEUTROPHILS # BLD AUTO: 4.8 K/UL — SIGNIFICANT CHANGE UP (ref 1.8–7.4)
NEUTROPHILS # BLD AUTO: 5.05 K/UL — SIGNIFICANT CHANGE UP (ref 1.8–7.4)
NEUTROPHILS # BLD AUTO: 5.31 K/UL — SIGNIFICANT CHANGE UP (ref 1.8–7.4)
NEUTROPHILS NFR BLD AUTO: 70.6 % — SIGNIFICANT CHANGE UP (ref 43–77)
NEUTROPHILS NFR BLD AUTO: 71.8 % — SIGNIFICANT CHANGE UP (ref 43–77)
NEUTROPHILS NFR BLD AUTO: 73.4 % — SIGNIFICANT CHANGE UP (ref 43–77)
NRBC # BLD AUTO: 0 K/UL — SIGNIFICANT CHANGE UP (ref 0–0)
NRBC # FLD: 0 K/UL — SIGNIFICANT CHANGE UP (ref 0–0)
NRBC BLD AUTO-RTO: 0 /100 WBCS — SIGNIFICANT CHANGE UP (ref 0–0)
PHOSPHATE SERPL-MCNC: 3.1 MG/DL — SIGNIFICANT CHANGE UP (ref 2.5–4.5)
PHOSPHATE SERPL-MCNC: 3.4 MG/DL — SIGNIFICANT CHANGE UP (ref 2.5–4.5)
PLATELET # BLD AUTO: 81 K/UL — LOW (ref 150–400)
PLATELET # BLD AUTO: 96 K/UL — LOW (ref 150–400)
PLATELET # BLD AUTO: 99 K/UL — LOW (ref 150–400)
PMV BLD: 11.9 FL — SIGNIFICANT CHANGE UP (ref 7–13)
PMV BLD: 12.2 FL — SIGNIFICANT CHANGE UP (ref 7–13)
PMV BLD: 12.2 FL — SIGNIFICANT CHANGE UP (ref 7–13)
POTASSIUM SERPL-MCNC: 3 MMOL/L — LOW (ref 3.5–5.3)
POTASSIUM SERPL-MCNC: 3.9 MMOL/L — SIGNIFICANT CHANGE UP (ref 3.5–5.3)
POTASSIUM SERPL-SCNC: 3 MMOL/L — LOW (ref 3.5–5.3)
POTASSIUM SERPL-SCNC: 3.9 MMOL/L — SIGNIFICANT CHANGE UP (ref 3.5–5.3)
PROT SERPL-MCNC: 4 G/DL — LOW (ref 6–8.3)
PROT SERPL-MCNC: 4 G/DL — LOW (ref 6–8.3)
PROTHROM AB SERPL-ACNC: 13.3 SEC — SIGNIFICANT CHANGE UP (ref 9.9–13.4)
RBC # BLD: 3.21 M/UL — LOW (ref 4.2–5.8)
RBC # BLD: 3.21 M/UL — LOW (ref 4.2–5.8)
RBC # BLD: 3.33 M/UL — LOW (ref 4.2–5.8)
RBC # FLD: 15 % — HIGH (ref 10.3–14.5)
RBC # FLD: 15.2 % — HIGH (ref 10.3–14.5)
RBC # FLD: 15.3 % — HIGH (ref 10.3–14.5)
SODIUM SERPL-SCNC: 137 MMOL/L — SIGNIFICANT CHANGE UP (ref 135–145)
SODIUM SERPL-SCNC: 139 MMOL/L — SIGNIFICANT CHANGE UP (ref 135–145)
SPECIMEN SOURCE: SIGNIFICANT CHANGE UP
TSH SERPL-MCNC: 4.11 UIU/ML — SIGNIFICANT CHANGE UP (ref 0.27–4.2)
VIT B12 SERPL-MCNC: 740 PG/ML — SIGNIFICANT CHANGE UP (ref 232–1245)
WBC # BLD: 6.54 K/UL — SIGNIFICANT CHANGE UP (ref 3.8–10.5)
WBC # BLD: 7.04 K/UL — SIGNIFICANT CHANGE UP (ref 3.8–10.5)
WBC # BLD: 7.51 K/UL — SIGNIFICANT CHANGE UP (ref 3.8–10.5)
WBC # FLD AUTO: 6.54 K/UL — SIGNIFICANT CHANGE UP (ref 3.8–10.5)
WBC # FLD AUTO: 7.04 K/UL — SIGNIFICANT CHANGE UP (ref 3.8–10.5)
WBC # FLD AUTO: 7.51 K/UL — SIGNIFICANT CHANGE UP (ref 3.8–10.5)

## 2025-07-20 PROCEDURE — 83605 ASSAY OF LACTIC ACID: CPT

## 2025-07-20 PROCEDURE — 85025 COMPLETE CBC W/AUTO DIFF WBC: CPT

## 2025-07-20 PROCEDURE — 85385 FIBRINOGEN ANTIGEN: CPT

## 2025-07-20 PROCEDURE — 94002 VENT MGMT INPAT INIT DAY: CPT

## 2025-07-20 PROCEDURE — 70450 CT HEAD/BRAIN W/O DYE: CPT

## 2025-07-20 PROCEDURE — 86900 BLOOD TYPING SEROLOGIC ABO: CPT

## 2025-07-20 PROCEDURE — 85027 COMPLETE CBC AUTOMATED: CPT

## 2025-07-20 PROCEDURE — 82746 ASSAY OF FOLIC ACID SERUM: CPT

## 2025-07-20 PROCEDURE — C1889: CPT

## 2025-07-20 PROCEDURE — 71045 X-RAY EXAM CHEST 1 VIEW: CPT

## 2025-07-20 PROCEDURE — C1887: CPT

## 2025-07-20 PROCEDURE — 85014 HEMATOCRIT: CPT

## 2025-07-20 PROCEDURE — 87045 FECES CULTURE AEROBIC BACT: CPT

## 2025-07-20 PROCEDURE — C1760: CPT

## 2025-07-20 PROCEDURE — 94003 VENT MGMT INPAT SUBQ DAY: CPT

## 2025-07-20 PROCEDURE — 71260 CT THORAX DX C+: CPT

## 2025-07-20 PROCEDURE — 87077 CULTURE AEROBIC IDENTIFY: CPT

## 2025-07-20 PROCEDURE — 82330 ASSAY OF CALCIUM: CPT

## 2025-07-20 PROCEDURE — 87086 URINE CULTURE/COLONY COUNT: CPT

## 2025-07-20 PROCEDURE — 84295 ASSAY OF SERUM SODIUM: CPT

## 2025-07-20 PROCEDURE — 85045 AUTOMATED RETICULOCYTE COUNT: CPT

## 2025-07-20 PROCEDURE — 87641 MR-STAPH DNA AMP PROBE: CPT

## 2025-07-20 PROCEDURE — 87040 BLOOD CULTURE FOR BACTERIA: CPT

## 2025-07-20 PROCEDURE — 85379 FIBRIN DEGRADATION QUANT: CPT

## 2025-07-20 PROCEDURE — 82947 ASSAY GLUCOSE BLOOD QUANT: CPT

## 2025-07-20 PROCEDURE — 87640 STAPH A DNA AMP PROBE: CPT

## 2025-07-20 PROCEDURE — 87507 IADNA-DNA/RNA PROBE TQ 12-25: CPT

## 2025-07-20 PROCEDURE — 99232 SBSQ HOSP IP/OBS MODERATE 35: CPT | Mod: GC

## 2025-07-20 PROCEDURE — 83520 IMMUNOASSAY QUANT NOS NONAB: CPT

## 2025-07-20 PROCEDURE — 80053 COMPREHEN METABOLIC PANEL: CPT

## 2025-07-20 PROCEDURE — 82435 ASSAY OF BLOOD CHLORIDE: CPT

## 2025-07-20 PROCEDURE — 84478 ASSAY OF TRIGLYCERIDES: CPT

## 2025-07-20 PROCEDURE — 85018 HEMOGLOBIN: CPT

## 2025-07-20 PROCEDURE — 84443 ASSAY THYROID STIM HORMONE: CPT

## 2025-07-20 PROCEDURE — 85384 FIBRINOGEN ACTIVITY: CPT

## 2025-07-20 PROCEDURE — 97161 PT EVAL LOW COMPLEX 20 MIN: CPT

## 2025-07-20 PROCEDURE — 83615 LACTATE (LD) (LDH) ENZYME: CPT

## 2025-07-20 PROCEDURE — 74176 CT ABD & PELVIS W/O CONTRAST: CPT

## 2025-07-20 PROCEDURE — 81001 URINALYSIS AUTO W/SCOPE: CPT

## 2025-07-20 PROCEDURE — 83735 ASSAY OF MAGNESIUM: CPT

## 2025-07-20 PROCEDURE — 74177 CT ABD & PELVIS W/CONTRAST: CPT

## 2025-07-20 PROCEDURE — C1769: CPT

## 2025-07-20 PROCEDURE — 84132 ASSAY OF SERUM POTASSIUM: CPT

## 2025-07-20 PROCEDURE — 86901 BLOOD TYPING SEROLOGIC RH(D): CPT

## 2025-07-20 PROCEDURE — 36415 COLL VENOUS BLD VENIPUNCTURE: CPT

## 2025-07-20 PROCEDURE — 82728 ASSAY OF FERRITIN: CPT

## 2025-07-20 PROCEDURE — 82607 VITAMIN B-12: CPT

## 2025-07-20 PROCEDURE — C1894: CPT

## 2025-07-20 PROCEDURE — 84100 ASSAY OF PHOSPHORUS: CPT

## 2025-07-20 PROCEDURE — 85610 PROTHROMBIN TIME: CPT

## 2025-07-20 PROCEDURE — 82962 GLUCOSE BLOOD TEST: CPT

## 2025-07-20 PROCEDURE — 82803 BLOOD GASES ANY COMBINATION: CPT

## 2025-07-20 PROCEDURE — 86923 COMPATIBILITY TEST ELECTRIC: CPT

## 2025-07-20 PROCEDURE — 86850 RBC ANTIBODY SCREEN: CPT

## 2025-07-20 PROCEDURE — 85730 THROMBOPLASTIN TIME PARTIAL: CPT

## 2025-07-20 PROCEDURE — 83010 ASSAY OF HAPTOGLOBIN QUANT: CPT

## 2025-07-20 RX ORDER — MAGNESIUM SULFATE 500 MG/ML
1 SYRINGE (ML) INJECTION ONCE
Refills: 0 | Status: COMPLETED | OUTPATIENT
Start: 2025-07-20 | End: 2025-07-20

## 2025-07-20 RX ORDER — MEROPENEM 1 G/30ML
1000 INJECTION INTRAVENOUS EVERY 12 HOURS
Refills: 0 | Status: DISCONTINUED | OUTPATIENT
Start: 2025-07-20 | End: 2025-07-20

## 2025-07-20 RX ORDER — CEFTRIAXONE 500 MG/1
1000 INJECTION, POWDER, FOR SOLUTION INTRAMUSCULAR; INTRAVENOUS EVERY 24 HOURS
Refills: 0 | Status: COMPLETED | OUTPATIENT
Start: 2025-07-20 | End: 2025-07-25

## 2025-07-20 RX ADMIN — CEFTRIAXONE 100 MILLIGRAM(S): 500 INJECTION, POWDER, FOR SOLUTION INTRAMUSCULAR; INTRAVENOUS at 11:45

## 2025-07-20 RX ADMIN — PROPOFOL 10.2 MICROGRAM(S)/KG/MIN: 10 INJECTION, EMULSION INTRAVENOUS at 07:48

## 2025-07-20 RX ADMIN — Medication 100 MILLIEQUIVALENT(S): at 05:13

## 2025-07-20 RX ADMIN — Medication 15 MILLILITER(S): at 05:14

## 2025-07-20 RX ADMIN — Medication 100 MILLIEQUIVALENT(S): at 06:24

## 2025-07-20 RX ADMIN — Medication 1 APPLICATION(S): at 05:14

## 2025-07-20 RX ADMIN — Medication 100 MILLIEQUIVALENT(S): at 04:05

## 2025-07-20 RX ADMIN — Medication 10 MG/HR: at 07:47

## 2025-07-20 RX ADMIN — Medication 40 MILLIGRAM(S): at 17:44

## 2025-07-20 RX ADMIN — NOREPINEPHRINE BITARTRATE 6.36 MICROGRAM(S)/KG/MIN: 8 SOLUTION at 07:47

## 2025-07-20 RX ADMIN — MEROPENEM 100 MILLIGRAM(S): 1 INJECTION INTRAVENOUS at 05:13

## 2025-07-20 RX ADMIN — Medication 100 GRAM(S): at 04:05

## 2025-07-20 RX ADMIN — Medication 10 MG/HR: at 09:24

## 2025-07-20 RX ADMIN — Medication 10 MG/HR: at 00:13

## 2025-07-21 LAB
ALBUMIN SERPL ELPH-MCNC: 2.5 G/DL — LOW (ref 3.3–5)
ALP SERPL-CCNC: 67 U/L — SIGNIFICANT CHANGE UP (ref 40–120)
ALT FLD-CCNC: 34 U/L — SIGNIFICANT CHANGE UP (ref 10–45)
ANION GAP SERPL CALC-SCNC: 10 MMOL/L — SIGNIFICANT CHANGE UP (ref 5–17)
APTT BLD: 25.6 SEC — LOW (ref 26.1–36.8)
AST SERPL-CCNC: 38 U/L — SIGNIFICANT CHANGE UP (ref 10–40)
BASOPHILS # BLD AUTO: 0.03 K/UL — SIGNIFICANT CHANGE UP (ref 0–0.2)
BASOPHILS # BLD AUTO: 0.04 K/UL — SIGNIFICANT CHANGE UP (ref 0–0.2)
BASOPHILS NFR BLD AUTO: 0.4 % — SIGNIFICANT CHANGE UP (ref 0–2)
BASOPHILS NFR BLD AUTO: 0.6 % — SIGNIFICANT CHANGE UP (ref 0–2)
BILIRUB SERPL-MCNC: 0.7 MG/DL — SIGNIFICANT CHANGE UP (ref 0.2–1.2)
BUN SERPL-MCNC: 40 MG/DL — HIGH (ref 7–23)
CALCIUM SERPL-MCNC: 8.7 MG/DL — SIGNIFICANT CHANGE UP (ref 8.4–10.5)
CHLORIDE SERPL-SCNC: 111 MMOL/L — HIGH (ref 96–108)
CO2 SERPL-SCNC: 19 MMOL/L — LOW (ref 22–31)
CREAT SERPL-MCNC: 1.46 MG/DL — HIGH (ref 0.5–1.3)
EGFR: 50 ML/MIN/1.73M2 — LOW
EGFR: 50 ML/MIN/1.73M2 — LOW
EOSINOPHIL # BLD AUTO: 0.25 K/UL — SIGNIFICANT CHANGE UP (ref 0–0.5)
EOSINOPHIL # BLD AUTO: 0.26 K/UL — SIGNIFICANT CHANGE UP (ref 0–0.5)
EOSINOPHIL NFR BLD AUTO: 3.5 % — SIGNIFICANT CHANGE UP (ref 0–6)
EOSINOPHIL NFR BLD AUTO: 3.8 % — SIGNIFICANT CHANGE UP (ref 0–6)
FERRITIN SERPL-MCNC: 4174 NG/ML — HIGH (ref 30–400)
FIBRINOGEN PPP-MCNC: 166 MG/DL — LOW (ref 200–445)
GAS PNL BLDV: SIGNIFICANT CHANGE UP
GLUCOSE SERPL-MCNC: 106 MG/DL — HIGH (ref 70–99)
HCT VFR BLD CALC: 28.8 % — LOW (ref 39–50)
HCT VFR BLD CALC: 29.3 % — LOW (ref 39–50)
HGB BLD-MCNC: 9.7 G/DL — LOW (ref 13–17)
HGB BLD-MCNC: 9.9 G/DL — LOW (ref 13–17)
IMM GRANULOCYTES # BLD AUTO: 0.1 K/UL — HIGH (ref 0–0.07)
IMM GRANULOCYTES # BLD AUTO: 0.11 K/UL — HIGH (ref 0–0.07)
IMM GRANULOCYTES NFR BLD AUTO: 1.4 % — HIGH (ref 0–0.9)
IMM GRANULOCYTES NFR BLD AUTO: 1.6 % — HIGH (ref 0–0.9)
INR BLD: 1.11 RATIO — SIGNIFICANT CHANGE UP (ref 0.85–1.16)
LYMPHOCYTES # BLD AUTO: 1.16 K/UL — SIGNIFICANT CHANGE UP (ref 1–3.3)
LYMPHOCYTES # BLD AUTO: 1.41 K/UL — SIGNIFICANT CHANGE UP (ref 1–3.3)
LYMPHOCYTES NFR BLD AUTO: 16.9 % — SIGNIFICANT CHANGE UP (ref 13–44)
LYMPHOCYTES NFR BLD AUTO: 19.6 % — SIGNIFICANT CHANGE UP (ref 13–44)
MAGNESIUM SERPL-MCNC: 2.1 MG/DL — SIGNIFICANT CHANGE UP (ref 1.6–2.6)
MCHC RBC-ENTMCNC: 29.5 PG — SIGNIFICANT CHANGE UP (ref 27–34)
MCHC RBC-ENTMCNC: 29.6 PG — SIGNIFICANT CHANGE UP (ref 27–34)
MCHC RBC-ENTMCNC: 33.7 G/DL — SIGNIFICANT CHANGE UP (ref 32–36)
MCHC RBC-ENTMCNC: 33.8 G/DL — SIGNIFICANT CHANGE UP (ref 32–36)
MCV RBC AUTO: 87.5 FL — SIGNIFICANT CHANGE UP (ref 80–100)
MCV RBC AUTO: 87.7 FL — SIGNIFICANT CHANGE UP (ref 80–100)
MONOCYTES # BLD AUTO: 0.61 K/UL — SIGNIFICANT CHANGE UP (ref 0–0.9)
MONOCYTES # BLD AUTO: 0.73 K/UL — SIGNIFICANT CHANGE UP (ref 0–0.9)
MONOCYTES NFR BLD AUTO: 10.1 % — SIGNIFICANT CHANGE UP (ref 2–14)
MONOCYTES NFR BLD AUTO: 8.9 % — SIGNIFICANT CHANGE UP (ref 2–14)
NEUTROPHILS # BLD AUTO: 4.67 K/UL — SIGNIFICANT CHANGE UP (ref 1.8–7.4)
NEUTROPHILS # BLD AUTO: 4.7 K/UL — SIGNIFICANT CHANGE UP (ref 1.8–7.4)
NEUTROPHILS NFR BLD AUTO: 64.8 % — SIGNIFICANT CHANGE UP (ref 43–77)
NEUTROPHILS NFR BLD AUTO: 68.4 % — SIGNIFICANT CHANGE UP (ref 43–77)
NRBC # BLD AUTO: 0 K/UL — SIGNIFICANT CHANGE UP (ref 0–0)
NRBC # BLD AUTO: 0 K/UL — SIGNIFICANT CHANGE UP (ref 0–0)
NRBC # FLD: 0 K/UL — SIGNIFICANT CHANGE UP (ref 0–0)
NRBC # FLD: 0 K/UL — SIGNIFICANT CHANGE UP (ref 0–0)
NRBC BLD AUTO-RTO: 0 /100 WBCS — SIGNIFICANT CHANGE UP (ref 0–0)
NRBC BLD AUTO-RTO: 0 /100 WBCS — SIGNIFICANT CHANGE UP (ref 0–0)
PHOSPHATE SERPL-MCNC: 3.1 MG/DL — SIGNIFICANT CHANGE UP (ref 2.5–4.5)
PLATELET # BLD AUTO: 103 K/UL — LOW (ref 150–400)
PLATELET # BLD AUTO: 122 K/UL — LOW (ref 150–400)
PMV BLD: 12.3 FL — SIGNIFICANT CHANGE UP (ref 7–13)
PMV BLD: 12.5 FL — SIGNIFICANT CHANGE UP (ref 7–13)
POTASSIUM SERPL-MCNC: 3.8 MMOL/L — SIGNIFICANT CHANGE UP (ref 3.5–5.3)
POTASSIUM SERPL-SCNC: 3.8 MMOL/L — SIGNIFICANT CHANGE UP (ref 3.5–5.3)
PROT SERPL-MCNC: 4.3 G/DL — LOW (ref 6–8.3)
PROTHROM AB SERPL-ACNC: 12.6 SEC — SIGNIFICANT CHANGE UP (ref 9.9–13.4)
RBC # BLD: 3.29 M/UL — LOW (ref 4.2–5.8)
RBC # BLD: 3.34 M/UL — LOW (ref 4.2–5.8)
RBC # FLD: 15.6 % — HIGH (ref 10.3–14.5)
RBC # FLD: 15.7 % — HIGH (ref 10.3–14.5)
SODIUM SERPL-SCNC: 140 MMOL/L — SIGNIFICANT CHANGE UP (ref 135–145)
TRIGL SERPL-MCNC: 202 MG/DL — HIGH
WBC # BLD: 6.87 K/UL — SIGNIFICANT CHANGE UP (ref 3.8–10.5)
WBC # BLD: 7.2 K/UL — SIGNIFICANT CHANGE UP (ref 3.8–10.5)
WBC # FLD AUTO: 6.87 K/UL — SIGNIFICANT CHANGE UP (ref 3.8–10.5)
WBC # FLD AUTO: 7.2 K/UL — SIGNIFICANT CHANGE UP (ref 3.8–10.5)

## 2025-07-21 PROCEDURE — 82330 ASSAY OF CALCIUM: CPT

## 2025-07-21 PROCEDURE — C1769: CPT

## 2025-07-21 PROCEDURE — 83010 ASSAY OF HAPTOGLOBIN QUANT: CPT

## 2025-07-21 PROCEDURE — 97161 PT EVAL LOW COMPLEX 20 MIN: CPT

## 2025-07-21 PROCEDURE — 85025 COMPLETE CBC W/AUTO DIFF WBC: CPT

## 2025-07-21 PROCEDURE — 99231 SBSQ HOSP IP/OBS SF/LOW 25: CPT | Mod: GC

## 2025-07-21 PROCEDURE — 82435 ASSAY OF BLOOD CHLORIDE: CPT

## 2025-07-21 PROCEDURE — 87641 MR-STAPH DNA AMP PROBE: CPT

## 2025-07-21 PROCEDURE — P9100: CPT

## 2025-07-21 PROCEDURE — 83615 LACTATE (LD) (LDH) ENZYME: CPT

## 2025-07-21 PROCEDURE — 97165 OT EVAL LOW COMPLEX 30 MIN: CPT

## 2025-07-21 PROCEDURE — 85018 HEMOGLOBIN: CPT

## 2025-07-21 PROCEDURE — 85379 FIBRIN DEGRADATION QUANT: CPT

## 2025-07-21 PROCEDURE — 71045 X-RAY EXAM CHEST 1 VIEW: CPT

## 2025-07-21 PROCEDURE — 82607 VITAMIN B-12: CPT

## 2025-07-21 PROCEDURE — 85045 AUTOMATED RETICULOCYTE COUNT: CPT

## 2025-07-21 PROCEDURE — 87086 URINE CULTURE/COLONY COUNT: CPT

## 2025-07-21 PROCEDURE — 86901 BLOOD TYPING SEROLOGIC RH(D): CPT

## 2025-07-21 PROCEDURE — 87077 CULTURE AEROBIC IDENTIFY: CPT

## 2025-07-21 PROCEDURE — 94003 VENT MGMT INPAT SUBQ DAY: CPT

## 2025-07-21 PROCEDURE — 92610 EVALUATE SWALLOWING FUNCTION: CPT

## 2025-07-21 PROCEDURE — 99231 SBSQ HOSP IP/OBS SF/LOW 25: CPT | Mod: FS

## 2025-07-21 PROCEDURE — P9016: CPT

## 2025-07-21 PROCEDURE — 94002 VENT MGMT INPAT INIT DAY: CPT

## 2025-07-21 PROCEDURE — P9037: CPT

## 2025-07-21 PROCEDURE — 36415 COLL VENOUS BLD VENIPUNCTURE: CPT

## 2025-07-21 PROCEDURE — 99291 CRITICAL CARE FIRST HOUR: CPT

## 2025-07-21 PROCEDURE — 81001 URINALYSIS AUTO W/SCOPE: CPT

## 2025-07-21 PROCEDURE — 87040 BLOOD CULTURE FOR BACTERIA: CPT

## 2025-07-21 PROCEDURE — 87640 STAPH A DNA AMP PROBE: CPT

## 2025-07-21 PROCEDURE — 82728 ASSAY OF FERRITIN: CPT

## 2025-07-21 PROCEDURE — 86850 RBC ANTIBODY SCREEN: CPT

## 2025-07-21 PROCEDURE — 84100 ASSAY OF PHOSPHORUS: CPT

## 2025-07-21 PROCEDURE — 86923 COMPATIBILITY TEST ELECTRIC: CPT

## 2025-07-21 PROCEDURE — 82746 ASSAY OF FOLIC ACID SERUM: CPT

## 2025-07-21 PROCEDURE — C1889: CPT

## 2025-07-21 PROCEDURE — 84132 ASSAY OF SERUM POTASSIUM: CPT

## 2025-07-21 PROCEDURE — 85027 COMPLETE CBC AUTOMATED: CPT

## 2025-07-21 PROCEDURE — 83605 ASSAY OF LACTIC ACID: CPT

## 2025-07-21 PROCEDURE — 85385 FIBRINOGEN ANTIGEN: CPT

## 2025-07-21 PROCEDURE — 84478 ASSAY OF TRIGLYCERIDES: CPT

## 2025-07-21 PROCEDURE — P9059: CPT

## 2025-07-21 PROCEDURE — P9040: CPT

## 2025-07-21 PROCEDURE — 84443 ASSAY THYROID STIM HORMONE: CPT

## 2025-07-21 PROCEDURE — 85610 PROTHROMBIN TIME: CPT

## 2025-07-21 PROCEDURE — C1052: CPT

## 2025-07-21 PROCEDURE — 87045 FECES CULTURE AEROBIC BACT: CPT

## 2025-07-21 PROCEDURE — 86900 BLOOD TYPING SEROLOGIC ABO: CPT

## 2025-07-21 PROCEDURE — 83735 ASSAY OF MAGNESIUM: CPT

## 2025-07-21 PROCEDURE — 85730 THROMBOPLASTIN TIME PARTIAL: CPT

## 2025-07-21 PROCEDURE — 83520 IMMUNOASSAY QUANT NOS NONAB: CPT

## 2025-07-21 PROCEDURE — 70450 CT HEAD/BRAIN W/O DYE: CPT

## 2025-07-21 PROCEDURE — 71260 CT THORAX DX C+: CPT

## 2025-07-21 PROCEDURE — 82962 GLUCOSE BLOOD TEST: CPT

## 2025-07-21 PROCEDURE — 74176 CT ABD & PELVIS W/O CONTRAST: CPT

## 2025-07-21 PROCEDURE — 74177 CT ABD & PELVIS W/CONTRAST: CPT

## 2025-07-21 PROCEDURE — 84295 ASSAY OF SERUM SODIUM: CPT

## 2025-07-21 PROCEDURE — 82803 BLOOD GASES ANY COMBINATION: CPT

## 2025-07-21 PROCEDURE — C1887: CPT

## 2025-07-21 PROCEDURE — 82947 ASSAY GLUCOSE BLOOD QUANT: CPT

## 2025-07-21 PROCEDURE — 85014 HEMATOCRIT: CPT

## 2025-07-21 PROCEDURE — C1894: CPT

## 2025-07-21 PROCEDURE — 80053 COMPREHEN METABOLIC PANEL: CPT

## 2025-07-21 PROCEDURE — 87507 IADNA-DNA/RNA PROBE TQ 12-25: CPT

## 2025-07-21 PROCEDURE — 85384 FIBRINOGEN ACTIVITY: CPT

## 2025-07-21 PROCEDURE — C1760: CPT

## 2025-07-21 RX ORDER — AMLODIPINE BESYLATE 10 MG/1
10 TABLET ORAL DAILY
Refills: 0 | Status: DISCONTINUED | OUTPATIENT
Start: 2025-07-21 | End: 2025-07-28

## 2025-07-21 RX ADMIN — Medication 10 MILLIGRAM(S): at 07:32

## 2025-07-21 RX ADMIN — Medication 1 APPLICATION(S): at 05:42

## 2025-07-21 RX ADMIN — Medication 10 MILLIGRAM(S): at 11:25

## 2025-07-21 RX ADMIN — Medication 40 MILLIGRAM(S): at 05:43

## 2025-07-21 RX ADMIN — AMLODIPINE BESYLATE 10 MILLIGRAM(S): 10 TABLET ORAL at 13:45

## 2025-07-21 RX ADMIN — CEFTRIAXONE 100 MILLIGRAM(S): 500 INJECTION, POWDER, FOR SOLUTION INTRAMUSCULAR; INTRAVENOUS at 11:25

## 2025-07-21 RX ADMIN — Medication 40 MILLIGRAM(S): at 17:28

## 2025-07-22 ENCOUNTER — TRANSCRIPTION ENCOUNTER (OUTPATIENT)
Age: 75
End: 2025-07-22

## 2025-07-22 DIAGNOSIS — R74.01 ELEVATION OF LEVELS OF LIVER TRANSAMINASE LEVELS: ICD-10-CM

## 2025-07-22 DIAGNOSIS — M79.652 PAIN IN LEFT THIGH: ICD-10-CM

## 2025-07-22 LAB
ALBUMIN SERPL ELPH-MCNC: 2.4 G/DL — LOW (ref 3.3–5)
ALP SERPL-CCNC: 74 U/L — SIGNIFICANT CHANGE UP (ref 40–120)
ALT FLD-CCNC: 296 U/L — HIGH (ref 10–45)
ANION GAP SERPL CALC-SCNC: 10 MMOL/L — SIGNIFICANT CHANGE UP (ref 5–17)
APTT BLD: 23.9 SEC — LOW (ref 26.1–36.8)
AST SERPL-CCNC: 392 U/L — HIGH (ref 10–40)
BASOPHILS # BLD AUTO: 0.02 K/UL — SIGNIFICANT CHANGE UP (ref 0–0.2)
BASOPHILS # BLD MANUAL: 0 K/UL — SIGNIFICANT CHANGE UP (ref 0–0.2)
BASOPHILS NFR BLD AUTO: 0.3 % — SIGNIFICANT CHANGE UP (ref 0–2)
BASOPHILS NFR BLD MANUAL: 0 % — SIGNIFICANT CHANGE UP (ref 0–2)
BILIRUB SERPL-MCNC: 0.7 MG/DL — SIGNIFICANT CHANGE UP (ref 0.2–1.2)
BLD GP AB SCN SERPL QL: NEGATIVE — SIGNIFICANT CHANGE UP
BUN SERPL-MCNC: 21 MG/DL — SIGNIFICANT CHANGE UP (ref 7–23)
CALCIUM SERPL-MCNC: 8.4 MG/DL — SIGNIFICANT CHANGE UP (ref 8.4–10.5)
CHLORIDE SERPL-SCNC: 107 MMOL/L — SIGNIFICANT CHANGE UP (ref 96–108)
CO2 SERPL-SCNC: 21 MMOL/L — LOW (ref 22–31)
CREAT SERPL-MCNC: 1.02 MG/DL — SIGNIFICANT CHANGE UP (ref 0.5–1.3)
CULTURE RESULTS: SIGNIFICANT CHANGE UP
EGFR: 77 ML/MIN/1.73M2 — SIGNIFICANT CHANGE UP
EGFR: 77 ML/MIN/1.73M2 — SIGNIFICANT CHANGE UP
EOSINOPHIL # BLD AUTO: 0.18 K/UL — SIGNIFICANT CHANGE UP (ref 0–0.5)
EOSINOPHIL # BLD MANUAL: 0 K/UL — SIGNIFICANT CHANGE UP (ref 0–0.5)
EOSINOPHIL NFR BLD AUTO: 2.3 % — SIGNIFICANT CHANGE UP (ref 0–6)
EOSINOPHIL NFR BLD MANUAL: 0 % — SIGNIFICANT CHANGE UP (ref 0–6)
GLUCOSE BLDC GLUCOMTR-MCNC: 112 MG/DL — HIGH (ref 70–99)
GLUCOSE BLDC GLUCOMTR-MCNC: 118 MG/DL — HIGH (ref 70–99)
GLUCOSE BLDC GLUCOMTR-MCNC: 119 MG/DL — HIGH (ref 70–99)
GLUCOSE BLDC GLUCOMTR-MCNC: 193 MG/DL — HIGH (ref 70–99)
GLUCOSE SERPL-MCNC: 108 MG/DL — HIGH (ref 70–99)
HCT VFR BLD CALC: 30.7 % — LOW (ref 39–50)
HGB BLD-MCNC: 10.1 G/DL — LOW (ref 13–17)
IL2 SERPL-MCNC: 4193 PG/ML — HIGH (ref 175.3–858.2)
IL2 SERPL-MCNC: 7265.1 PG/ML — HIGH (ref 175.3–858.2)
IMM GRANULOCYTES # BLD AUTO: 0.13 K/UL — HIGH (ref 0–0.07)
IMM GRANULOCYTES NFR BLD AUTO: 1.6 % — HIGH (ref 0–0.9)
INR BLD: 1.14 RATIO — SIGNIFICANT CHANGE UP (ref 0.85–1.16)
LYMPHOCYTES # BLD AUTO: 1.42 K/UL — SIGNIFICANT CHANGE UP (ref 1–3.3)
LYMPHOCYTES # BLD MANUAL: 0.85 K/UL — LOW (ref 1–3.3)
LYMPHOCYTES NFR BLD AUTO: 17.8 % — SIGNIFICANT CHANGE UP (ref 13–44)
LYMPHOCYTES NFR BLD MANUAL: 10.6 % — LOW (ref 13–44)
MAGNESIUM SERPL-MCNC: 1.9 MG/DL — SIGNIFICANT CHANGE UP (ref 1.6–2.6)
MANUAL NRBC #: 0.08 K/UL — HIGH (ref 0–0)
MCHC RBC-ENTMCNC: 29 PG — SIGNIFICANT CHANGE UP (ref 27–34)
MCHC RBC-ENTMCNC: 32.9 G/DL — SIGNIFICANT CHANGE UP (ref 32–36)
MCV RBC AUTO: 88.2 FL — SIGNIFICANT CHANGE UP (ref 80–100)
MONOCYTES # BLD AUTO: 0.81 K/UL — SIGNIFICANT CHANGE UP (ref 0–0.9)
MONOCYTES # BLD MANUAL: 0.22 K/UL — SIGNIFICANT CHANGE UP (ref 0–0.9)
MONOCYTES NFR BLD AUTO: 10.1 % — SIGNIFICANT CHANGE UP (ref 2–14)
MONOCYTES NFR BLD MANUAL: 2.7 % — SIGNIFICANT CHANGE UP (ref 2–14)
NEUTROPHILS # BLD AUTO: 5.44 K/UL — SIGNIFICANT CHANGE UP (ref 1.8–7.4)
NEUTROPHILS # BLD MANUAL: 6.94 K/UL — SIGNIFICANT CHANGE UP (ref 1.8–7.4)
NEUTROPHILS NFR BLD AUTO: 67.9 % — SIGNIFICANT CHANGE UP (ref 43–77)
NEUTROPHILS NFR BLD MANUAL: 86.7 % — HIGH (ref 43–77)
NRBC # BLD AUTO: 0 K/UL — SIGNIFICANT CHANGE UP (ref 0–0)
NRBC # BLD: 1 /100 WBCS — HIGH (ref 0–0)
NRBC # FLD: 0 K/UL — SIGNIFICANT CHANGE UP (ref 0–0)
NRBC BLD AUTO-RTO: 0 /100 WBCS — SIGNIFICANT CHANGE UP (ref 0–0)
NRBC BLD-RTO: 1 /100 WBCS — HIGH (ref 0–0)
PHOSPHATE SERPL-MCNC: 2.1 MG/DL — LOW (ref 2.5–4.5)
PLAT MORPH BLD: NORMAL — SIGNIFICANT CHANGE UP
PLATELET # BLD AUTO: 144 K/UL — LOW (ref 150–400)
PMV BLD: 11.5 FL — SIGNIFICANT CHANGE UP (ref 7–13)
POTASSIUM SERPL-MCNC: 3.6 MMOL/L — SIGNIFICANT CHANGE UP (ref 3.5–5.3)
POTASSIUM SERPL-SCNC: 3.6 MMOL/L — SIGNIFICANT CHANGE UP (ref 3.5–5.3)
PROT SERPL-MCNC: 4.5 G/DL — LOW (ref 6–8.3)
PROTHROM AB SERPL-ACNC: 13 SEC — SIGNIFICANT CHANGE UP (ref 9.9–13.4)
RBC # BLD: 3.48 M/UL — LOW (ref 4.2–5.8)
RBC # FLD: 15.5 % — HIGH (ref 10.3–14.5)
RBC BLD AUTO: SIGNIFICANT CHANGE UP
RH IG SCN BLD-IMP: NEGATIVE — SIGNIFICANT CHANGE UP
SODIUM SERPL-SCNC: 138 MMOL/L — SIGNIFICANT CHANGE UP (ref 135–145)
SPECIMEN SOURCE: SIGNIFICANT CHANGE UP
WBC # BLD: 8 K/UL — SIGNIFICANT CHANGE UP (ref 3.8–10.5)
WBC # FLD AUTO: 8 K/UL — SIGNIFICANT CHANGE UP (ref 3.8–10.5)

## 2025-07-22 PROCEDURE — 97165 OT EVAL LOW COMPLEX 30 MIN: CPT

## 2025-07-22 PROCEDURE — P9040: CPT

## 2025-07-22 PROCEDURE — C1052: CPT

## 2025-07-22 PROCEDURE — 81001 URINALYSIS AUTO W/SCOPE: CPT

## 2025-07-22 PROCEDURE — 84295 ASSAY OF SERUM SODIUM: CPT

## 2025-07-22 PROCEDURE — 74176 CT ABD & PELVIS W/O CONTRAST: CPT

## 2025-07-22 PROCEDURE — 82947 ASSAY GLUCOSE BLOOD QUANT: CPT

## 2025-07-22 PROCEDURE — 74230 X-RAY XM SWLNG FUNCJ C+: CPT | Mod: 26

## 2025-07-22 PROCEDURE — 84100 ASSAY OF PHOSPHORUS: CPT

## 2025-07-22 PROCEDURE — 70450 CT HEAD/BRAIN W/O DYE: CPT

## 2025-07-22 PROCEDURE — 85379 FIBRIN DEGRADATION QUANT: CPT

## 2025-07-22 PROCEDURE — 92610 EVALUATE SWALLOWING FUNCTION: CPT

## 2025-07-22 PROCEDURE — 71260 CT THORAX DX C+: CPT

## 2025-07-22 PROCEDURE — 82435 ASSAY OF BLOOD CHLORIDE: CPT

## 2025-07-22 PROCEDURE — 83010 ASSAY OF HAPTOGLOBIN QUANT: CPT

## 2025-07-22 PROCEDURE — 74177 CT ABD & PELVIS W/CONTRAST: CPT

## 2025-07-22 PROCEDURE — 87045 FECES CULTURE AEROBIC BACT: CPT

## 2025-07-22 PROCEDURE — 87640 STAPH A DNA AMP PROBE: CPT

## 2025-07-22 PROCEDURE — 97110 THERAPEUTIC EXERCISES: CPT

## 2025-07-22 PROCEDURE — 82728 ASSAY OF FERRITIN: CPT

## 2025-07-22 PROCEDURE — C1887: CPT

## 2025-07-22 PROCEDURE — P9059: CPT

## 2025-07-22 PROCEDURE — 94002 VENT MGMT INPAT INIT DAY: CPT

## 2025-07-22 PROCEDURE — 83605 ASSAY OF LACTIC ACID: CPT

## 2025-07-22 PROCEDURE — 85384 FIBRINOGEN ACTIVITY: CPT

## 2025-07-22 PROCEDURE — 87040 BLOOD CULTURE FOR BACTERIA: CPT

## 2025-07-22 PROCEDURE — 85025 COMPLETE CBC W/AUTO DIFF WBC: CPT

## 2025-07-22 PROCEDURE — 83520 IMMUNOASSAY QUANT NOS NONAB: CPT

## 2025-07-22 PROCEDURE — 84478 ASSAY OF TRIGLYCERIDES: CPT

## 2025-07-22 PROCEDURE — 93970 EXTREMITY STUDY: CPT | Mod: 26

## 2025-07-22 PROCEDURE — 93975 VASCULAR STUDY: CPT

## 2025-07-22 PROCEDURE — P9100: CPT

## 2025-07-22 PROCEDURE — 97530 THERAPEUTIC ACTIVITIES: CPT

## 2025-07-22 PROCEDURE — P9016: CPT

## 2025-07-22 PROCEDURE — 80053 COMPREHEN METABOLIC PANEL: CPT

## 2025-07-22 PROCEDURE — 84443 ASSAY THYROID STIM HORMONE: CPT

## 2025-07-22 PROCEDURE — 82330 ASSAY OF CALCIUM: CPT

## 2025-07-22 PROCEDURE — C1769: CPT

## 2025-07-22 PROCEDURE — 82746 ASSAY OF FOLIC ACID SERUM: CPT

## 2025-07-22 PROCEDURE — 87046 STOOL CULTR AEROBIC BACT EA: CPT

## 2025-07-22 PROCEDURE — 82607 VITAMIN B-12: CPT

## 2025-07-22 PROCEDURE — 94003 VENT MGMT INPAT SUBQ DAY: CPT

## 2025-07-22 PROCEDURE — 99233 SBSQ HOSP IP/OBS HIGH 50: CPT | Mod: GC

## 2025-07-22 PROCEDURE — 86850 RBC ANTIBODY SCREEN: CPT

## 2025-07-22 PROCEDURE — 83735 ASSAY OF MAGNESIUM: CPT

## 2025-07-22 PROCEDURE — 85014 HEMATOCRIT: CPT

## 2025-07-22 PROCEDURE — 86923 COMPATIBILITY TEST ELECTRIC: CPT

## 2025-07-22 PROCEDURE — 92611 MOTION FLUOROSCOPY/SWALLOW: CPT

## 2025-07-22 PROCEDURE — 36415 COLL VENOUS BLD VENIPUNCTURE: CPT

## 2025-07-22 PROCEDURE — P9037: CPT

## 2025-07-22 PROCEDURE — 93975 VASCULAR STUDY: CPT | Mod: 26

## 2025-07-22 PROCEDURE — 85385 FIBRINOGEN ANTIGEN: CPT

## 2025-07-22 PROCEDURE — 85027 COMPLETE CBC AUTOMATED: CPT

## 2025-07-22 PROCEDURE — 85730 THROMBOPLASTIN TIME PARTIAL: CPT

## 2025-07-22 PROCEDURE — 87077 CULTURE AEROBIC IDENTIFY: CPT

## 2025-07-22 PROCEDURE — 85018 HEMOGLOBIN: CPT

## 2025-07-22 PROCEDURE — 84132 ASSAY OF SERUM POTASSIUM: CPT

## 2025-07-22 PROCEDURE — C1894: CPT

## 2025-07-22 PROCEDURE — 83615 LACTATE (LD) (LDH) ENZYME: CPT

## 2025-07-22 PROCEDURE — C1760: CPT

## 2025-07-22 PROCEDURE — 71045 X-RAY EXAM CHEST 1 VIEW: CPT

## 2025-07-22 PROCEDURE — 93970 EXTREMITY STUDY: CPT

## 2025-07-22 PROCEDURE — 74230 X-RAY XM SWLNG FUNCJ C+: CPT

## 2025-07-22 PROCEDURE — 85610 PROTHROMBIN TIME: CPT

## 2025-07-22 PROCEDURE — 97161 PT EVAL LOW COMPLEX 20 MIN: CPT

## 2025-07-22 PROCEDURE — 86901 BLOOD TYPING SEROLOGIC RH(D): CPT

## 2025-07-22 PROCEDURE — 86900 BLOOD TYPING SEROLOGIC ABO: CPT

## 2025-07-22 PROCEDURE — 87086 URINE CULTURE/COLONY COUNT: CPT

## 2025-07-22 PROCEDURE — 85045 AUTOMATED RETICULOCYTE COUNT: CPT

## 2025-07-22 PROCEDURE — 87641 MR-STAPH DNA AMP PROBE: CPT

## 2025-07-22 PROCEDURE — C1889: CPT

## 2025-07-22 PROCEDURE — 82962 GLUCOSE BLOOD TEST: CPT

## 2025-07-22 PROCEDURE — 82803 BLOOD GASES ANY COMBINATION: CPT

## 2025-07-22 PROCEDURE — 87507 IADNA-DNA/RNA PROBE TQ 12-25: CPT

## 2025-07-22 RX ORDER — TRAMETINIB 0.05 MG/ML
2 POWDER, FOR SOLUTION ORAL DAILY
Refills: 0 | Status: DISCONTINUED | OUTPATIENT
Start: 2025-07-22 | End: 2025-07-28

## 2025-07-22 RX ORDER — DABRAFENIB 50 MG/1
150 CAPSULE ORAL EVERY 12 HOURS
Refills: 0 | Status: DISCONTINUED | OUTPATIENT
Start: 2025-07-22 | End: 2025-07-28

## 2025-07-22 RX ADMIN — DABRAFENIB 150 MILLIGRAM(S): 50 CAPSULE ORAL at 20:30

## 2025-07-22 RX ADMIN — TRAMETINIB 2 MILLIGRAM(S): 0.05 POWDER, FOR SOLUTION ORAL at 20:30

## 2025-07-22 RX ADMIN — CEFTRIAXONE 100 MILLIGRAM(S): 500 INJECTION, POWDER, FOR SOLUTION INTRAMUSCULAR; INTRAVENOUS at 13:40

## 2025-07-22 RX ADMIN — Medication 40 MILLIGRAM(S): at 17:19

## 2025-07-22 RX ADMIN — Medication 40 MILLIGRAM(S): at 05:55

## 2025-07-22 RX ADMIN — AMLODIPINE BESYLATE 10 MILLIGRAM(S): 10 TABLET ORAL at 05:55

## 2025-07-23 LAB
ALBUMIN SERPL ELPH-MCNC: 2.4 G/DL — LOW (ref 3.3–5)
ALP SERPL-CCNC: 66 U/L — SIGNIFICANT CHANGE UP (ref 40–120)
ALT FLD-CCNC: 228 U/L — HIGH (ref 10–45)
ANION GAP SERPL CALC-SCNC: 8 MMOL/L — SIGNIFICANT CHANGE UP (ref 5–17)
APTT BLD: 24.5 SEC — LOW (ref 26.1–36.8)
AST SERPL-CCNC: 159 U/L — HIGH (ref 10–40)
BASOPHILS # BLD AUTO: 0.04 K/UL — SIGNIFICANT CHANGE UP (ref 0–0.2)
BASOPHILS NFR BLD AUTO: 0.4 % — SIGNIFICANT CHANGE UP (ref 0–2)
BILIRUB SERPL-MCNC: 0.6 MG/DL — SIGNIFICANT CHANGE UP (ref 0.2–1.2)
BUN SERPL-MCNC: 27 MG/DL — HIGH (ref 7–23)
CALCIUM SERPL-MCNC: 8.4 MG/DL — SIGNIFICANT CHANGE UP (ref 8.4–10.5)
CHLORIDE SERPL-SCNC: 110 MMOL/L — HIGH (ref 96–108)
CO2 SERPL-SCNC: 23 MMOL/L — SIGNIFICANT CHANGE UP (ref 22–31)
CREAT SERPL-MCNC: 1.04 MG/DL — SIGNIFICANT CHANGE UP (ref 0.5–1.3)
CULTURE RESULTS: SIGNIFICANT CHANGE UP
CULTURE RESULTS: SIGNIFICANT CHANGE UP
D DIMER BLD IA.RAPID-MCNC: 663 NG/ML DDU — HIGH
EGFR: 75 ML/MIN/1.73M2 — SIGNIFICANT CHANGE UP
EGFR: 75 ML/MIN/1.73M2 — SIGNIFICANT CHANGE UP
EOSINOPHIL # BLD AUTO: 0.25 K/UL — SIGNIFICANT CHANGE UP (ref 0–0.5)
EOSINOPHIL NFR BLD AUTO: 2.3 % — SIGNIFICANT CHANGE UP (ref 0–6)
FIBRINOGEN AG PPP IA-MCNC: 82 MG/DL — LOW (ref 233–496)
GLUCOSE BLDC GLUCOMTR-MCNC: 139 MG/DL — HIGH (ref 70–99)
GLUCOSE SERPL-MCNC: 120 MG/DL — HIGH (ref 70–99)
HCT VFR BLD CALC: 22.8 % — LOW (ref 39–50)
HCT VFR BLD CALC: 23.5 % — LOW (ref 39–50)
HGB BLD-MCNC: 7.5 G/DL — LOW (ref 13–17)
HGB BLD-MCNC: 7.6 G/DL — LOW (ref 13–17)
IMM GRANULOCYTES # BLD AUTO: 0.32 K/UL — HIGH (ref 0–0.07)
IMM GRANULOCYTES NFR BLD AUTO: 2.9 % — HIGH (ref 0–0.9)
INR BLD: 1.12 RATIO — SIGNIFICANT CHANGE UP (ref 0.85–1.16)
LYMPHOCYTES # BLD AUTO: 1.26 K/UL — SIGNIFICANT CHANGE UP (ref 1–3.3)
LYMPHOCYTES NFR BLD AUTO: 11.6 % — LOW (ref 13–44)
MAGNESIUM SERPL-MCNC: 1.8 MG/DL — SIGNIFICANT CHANGE UP (ref 1.6–2.6)
MCHC RBC-ENTMCNC: 29.3 PG — SIGNIFICANT CHANGE UP (ref 27–34)
MCHC RBC-ENTMCNC: 29.5 PG — SIGNIFICANT CHANGE UP (ref 27–34)
MCHC RBC-ENTMCNC: 32.3 G/DL — SIGNIFICANT CHANGE UP (ref 32–36)
MCHC RBC-ENTMCNC: 32.9 G/DL — SIGNIFICANT CHANGE UP (ref 32–36)
MCV RBC AUTO: 89.8 FL — SIGNIFICANT CHANGE UP (ref 80–100)
MCV RBC AUTO: 90.7 FL — SIGNIFICANT CHANGE UP (ref 80–100)
MONOCYTES # BLD AUTO: 1.05 K/UL — HIGH (ref 0–0.9)
MONOCYTES NFR BLD AUTO: 9.7 % — SIGNIFICANT CHANGE UP (ref 2–14)
NEUTROPHILS # BLD AUTO: 7.95 K/UL — HIGH (ref 1.8–7.4)
NEUTROPHILS NFR BLD AUTO: 73.1 % — SIGNIFICANT CHANGE UP (ref 43–77)
NRBC # BLD AUTO: 0 K/UL — SIGNIFICANT CHANGE UP (ref 0–0)
NRBC # BLD AUTO: 0 K/UL — SIGNIFICANT CHANGE UP (ref 0–0)
NRBC # FLD: 0 K/UL — SIGNIFICANT CHANGE UP (ref 0–0)
NRBC # FLD: 0 K/UL — SIGNIFICANT CHANGE UP (ref 0–0)
NRBC BLD AUTO-RTO: 0 /100 WBCS — SIGNIFICANT CHANGE UP (ref 0–0)
NRBC BLD AUTO-RTO: 0 /100 WBCS — SIGNIFICANT CHANGE UP (ref 0–0)
PHOSPHATE SERPL-MCNC: 2.1 MG/DL — LOW (ref 2.5–4.5)
PLATELET # BLD AUTO: 159 K/UL — SIGNIFICANT CHANGE UP (ref 150–400)
PLATELET # BLD AUTO: 192 K/UL — SIGNIFICANT CHANGE UP (ref 150–400)
PMV BLD: 11.2 FL — SIGNIFICANT CHANGE UP (ref 7–13)
PMV BLD: 11.5 FL — SIGNIFICANT CHANGE UP (ref 7–13)
POTASSIUM SERPL-MCNC: 3.7 MMOL/L — SIGNIFICANT CHANGE UP (ref 3.5–5.3)
POTASSIUM SERPL-SCNC: 3.7 MMOL/L — SIGNIFICANT CHANGE UP (ref 3.5–5.3)
PROT SERPL-MCNC: 4.1 G/DL — LOW (ref 6–8.3)
PROTHROM AB SERPL-ACNC: 12.7 SEC — SIGNIFICANT CHANGE UP (ref 9.9–13.4)
RBC # BLD: 2.54 M/UL — LOW (ref 4.2–5.8)
RBC # BLD: 2.59 M/UL — LOW (ref 4.2–5.8)
RBC # FLD: 15.5 % — HIGH (ref 10.3–14.5)
RBC # FLD: 15.7 % — HIGH (ref 10.3–14.5)
SODIUM SERPL-SCNC: 141 MMOL/L — SIGNIFICANT CHANGE UP (ref 135–145)
SPECIMEN SOURCE: SIGNIFICANT CHANGE UP
SPECIMEN SOURCE: SIGNIFICANT CHANGE UP
WBC # BLD: 10.87 K/UL — HIGH (ref 3.8–10.5)
WBC # BLD: 12.33 K/UL — HIGH (ref 3.8–10.5)
WBC # FLD AUTO: 10.87 K/UL — HIGH (ref 3.8–10.5)
WBC # FLD AUTO: 12.33 K/UL — HIGH (ref 3.8–10.5)

## 2025-07-23 PROCEDURE — 74177 CT ABD & PELVIS W/CONTRAST: CPT | Mod: 26

## 2025-07-23 PROCEDURE — 99233 SBSQ HOSP IP/OBS HIGH 50: CPT | Mod: GC

## 2025-07-23 RX ORDER — SODIUM CHLORIDE 9 G/1000ML
500 INJECTION, SOLUTION INTRAVENOUS ONCE
Refills: 0 | Status: COMPLETED | OUTPATIENT
Start: 2025-07-23 | End: 2025-07-23

## 2025-07-23 RX ORDER — POTASSIUM PHOSPHATE, MONOBASIC POTASSIUM PHOSPHATE, DIBASIC INJECTION, 236; 224 MG/ML; MG/ML
15 SOLUTION, CONCENTRATE INTRAVENOUS ONCE
Refills: 0 | Status: COMPLETED | OUTPATIENT
Start: 2025-07-23 | End: 2025-07-23

## 2025-07-23 RX ADMIN — Medication 40 MILLIGRAM(S): at 18:13

## 2025-07-23 RX ADMIN — Medication 40 MILLIGRAM(S): at 05:14

## 2025-07-23 RX ADMIN — SODIUM CHLORIDE 500 MILLILITER(S): 9 INJECTION, SOLUTION INTRAVENOUS at 13:10

## 2025-07-23 RX ADMIN — DABRAFENIB 150 MILLIGRAM(S): 50 CAPSULE ORAL at 21:25

## 2025-07-23 RX ADMIN — Medication 10 MG/HR: at 21:01

## 2025-07-23 RX ADMIN — Medication 80 MILLIGRAM(S): at 21:02

## 2025-07-23 RX ADMIN — POTASSIUM PHOSPHATE, MONOBASIC POTASSIUM PHOSPHATE, DIBASIC INJECTION, 62.5 MILLIMOLE(S): 236; 224 SOLUTION, CONCENTRATE INTRAVENOUS at 09:09

## 2025-07-23 RX ADMIN — AMLODIPINE BESYLATE 10 MILLIGRAM(S): 10 TABLET ORAL at 05:14

## 2025-07-23 RX ADMIN — DABRAFENIB 150 MILLIGRAM(S): 50 CAPSULE ORAL at 05:14

## 2025-07-23 RX ADMIN — CEFTRIAXONE 100 MILLIGRAM(S): 500 INJECTION, POWDER, FOR SOLUTION INTRAMUSCULAR; INTRAVENOUS at 11:43

## 2025-07-24 DIAGNOSIS — D64.9 ANEMIA, UNSPECIFIED: ICD-10-CM

## 2025-07-24 DIAGNOSIS — D62 ACUTE POSTHEMORRHAGIC ANEMIA: ICD-10-CM

## 2025-07-24 LAB
ALBUMIN SERPL ELPH-MCNC: 2.5 G/DL — LOW (ref 3.3–5)
ALP SERPL-CCNC: 68 U/L — SIGNIFICANT CHANGE UP (ref 40–120)
ALT FLD-CCNC: 146 U/L — HIGH (ref 10–45)
ANION GAP SERPL CALC-SCNC: 8 MMOL/L — SIGNIFICANT CHANGE UP (ref 5–17)
AST SERPL-CCNC: 52 U/L — HIGH (ref 10–40)
BASOPHILS # BLD AUTO: 0.08 K/UL — SIGNIFICANT CHANGE UP (ref 0–0.2)
BASOPHILS NFR BLD AUTO: 0.8 % — SIGNIFICANT CHANGE UP (ref 0–2)
BILIRUB SERPL-MCNC: 0.7 MG/DL — SIGNIFICANT CHANGE UP (ref 0.2–1.2)
BUN SERPL-MCNC: 21 MG/DL — SIGNIFICANT CHANGE UP (ref 7–23)
CALCIUM SERPL-MCNC: 8.2 MG/DL — LOW (ref 8.4–10.5)
CHLORIDE SERPL-SCNC: 104 MMOL/L — SIGNIFICANT CHANGE UP (ref 96–108)
CO2 SERPL-SCNC: 24 MMOL/L — SIGNIFICANT CHANGE UP (ref 22–31)
CREAT SERPL-MCNC: 0.78 MG/DL — SIGNIFICANT CHANGE UP (ref 0.5–1.3)
EGFR: 94 ML/MIN/1.73M2 — SIGNIFICANT CHANGE UP
EGFR: 94 ML/MIN/1.73M2 — SIGNIFICANT CHANGE UP
EOSINOPHIL # BLD AUTO: 0.23 K/UL — SIGNIFICANT CHANGE UP (ref 0–0.5)
EOSINOPHIL NFR BLD AUTO: 2.3 % — SIGNIFICANT CHANGE UP (ref 0–6)
GLUCOSE BLDC GLUCOMTR-MCNC: 104 MG/DL — HIGH (ref 70–99)
GLUCOSE BLDC GLUCOMTR-MCNC: 110 MG/DL — HIGH (ref 70–99)
GLUCOSE BLDC GLUCOMTR-MCNC: 115 MG/DL — HIGH (ref 70–99)
GLUCOSE BLDC GLUCOMTR-MCNC: 116 MG/DL — HIGH (ref 70–99)
GLUCOSE BLDC GLUCOMTR-MCNC: 120 MG/DL — HIGH (ref 70–99)
GLUCOSE SERPL-MCNC: 110 MG/DL — HIGH (ref 70–99)
HCT VFR BLD CALC: 26.2 % — LOW (ref 39–50)
HCT VFR BLD CALC: 27.4 % — LOW (ref 39–50)
HCT VFR BLD CALC: 31 % — LOW (ref 39–50)
HGB BLD-MCNC: 10 G/DL — LOW (ref 13–17)
HGB BLD-MCNC: 8.5 G/DL — LOW (ref 13–17)
HGB BLD-MCNC: 8.7 G/DL — LOW (ref 13–17)
IMM GRANULOCYTES # BLD AUTO: 0.56 K/UL — HIGH (ref 0–0.07)
IMM GRANULOCYTES NFR BLD AUTO: 5.5 % — HIGH (ref 0–0.9)
LYMPHOCYTES # BLD AUTO: 0.98 K/UL — LOW (ref 1–3.3)
LYMPHOCYTES NFR BLD AUTO: 9.7 % — LOW (ref 13–44)
MAGNESIUM SERPL-MCNC: 1.7 MG/DL — SIGNIFICANT CHANGE UP (ref 1.6–2.6)
MCHC RBC-ENTMCNC: 28.3 PG — SIGNIFICANT CHANGE UP (ref 27–34)
MCHC RBC-ENTMCNC: 28.7 PG — SIGNIFICANT CHANGE UP (ref 27–34)
MCHC RBC-ENTMCNC: 29.2 PG — SIGNIFICANT CHANGE UP (ref 27–34)
MCHC RBC-ENTMCNC: 31.8 G/DL — LOW (ref 32–36)
MCHC RBC-ENTMCNC: 32.3 G/DL — SIGNIFICANT CHANGE UP (ref 32–36)
MCHC RBC-ENTMCNC: 32.4 G/DL — SIGNIFICANT CHANGE UP (ref 32–36)
MCV RBC AUTO: 88.5 FL — SIGNIFICANT CHANGE UP (ref 80–100)
MCV RBC AUTO: 89.3 FL — SIGNIFICANT CHANGE UP (ref 80–100)
MCV RBC AUTO: 90.6 FL — SIGNIFICANT CHANGE UP (ref 80–100)
MONOCYTES # BLD AUTO: 1.2 K/UL — HIGH (ref 0–0.9)
MONOCYTES NFR BLD AUTO: 11.8 % — SIGNIFICANT CHANGE UP (ref 2–14)
NEUTROPHILS # BLD AUTO: 7.1 K/UL — SIGNIFICANT CHANGE UP (ref 1.8–7.4)
NEUTROPHILS NFR BLD AUTO: 69.9 % — SIGNIFICANT CHANGE UP (ref 43–77)
NRBC # BLD AUTO: 0 K/UL — SIGNIFICANT CHANGE UP (ref 0–0)
NRBC # FLD: 0 K/UL — SIGNIFICANT CHANGE UP (ref 0–0)
NRBC BLD AUTO-RTO: 0 /100 WBCS — SIGNIFICANT CHANGE UP (ref 0–0)
PHOSPHATE SERPL-MCNC: 2 MG/DL — LOW (ref 2.5–4.5)
PLATELET # BLD AUTO: 177 K/UL — SIGNIFICANT CHANGE UP (ref 150–400)
PLATELET # BLD AUTO: 181 K/UL — SIGNIFICANT CHANGE UP (ref 150–400)
PLATELET # BLD AUTO: 235 K/UL — SIGNIFICANT CHANGE UP (ref 150–400)
PMV BLD: 10.9 FL — SIGNIFICANT CHANGE UP (ref 7–13)
PMV BLD: 11.2 FL — SIGNIFICANT CHANGE UP (ref 7–13)
PMV BLD: 11.5 FL — SIGNIFICANT CHANGE UP (ref 7–13)
POTASSIUM SERPL-MCNC: 3.8 MMOL/L — SIGNIFICANT CHANGE UP (ref 3.5–5.3)
POTASSIUM SERPL-SCNC: 3.8 MMOL/L — SIGNIFICANT CHANGE UP (ref 3.5–5.3)
PROT SERPL-MCNC: 4.4 G/DL — LOW (ref 6–8.3)
RBC # BLD: 2.96 M/UL — LOW (ref 4.2–5.8)
RBC # BLD: 3.07 M/UL — LOW (ref 4.2–5.8)
RBC # BLD: 3.42 M/UL — LOW (ref 4.2–5.8)
RBC # FLD: 16 % — HIGH (ref 10.3–14.5)
RBC # FLD: 16 % — HIGH (ref 10.3–14.5)
RBC # FLD: 16.1 % — HIGH (ref 10.3–14.5)
SODIUM SERPL-SCNC: 136 MMOL/L — SIGNIFICANT CHANGE UP (ref 135–145)
WBC # BLD: 10.15 K/UL — SIGNIFICANT CHANGE UP (ref 3.8–10.5)
WBC # BLD: 10.65 K/UL — HIGH (ref 3.8–10.5)
WBC # BLD: 12.57 K/UL — HIGH (ref 3.8–10.5)
WBC # FLD AUTO: 10.15 K/UL — SIGNIFICANT CHANGE UP (ref 3.8–10.5)
WBC # FLD AUTO: 10.65 K/UL — HIGH (ref 3.8–10.5)
WBC # FLD AUTO: 12.57 K/UL — HIGH (ref 3.8–10.5)

## 2025-07-24 PROCEDURE — 99233 SBSQ HOSP IP/OBS HIGH 50: CPT | Mod: GC

## 2025-07-24 PROCEDURE — 99232 SBSQ HOSP IP/OBS MODERATE 35: CPT | Mod: GC

## 2025-07-24 RX ORDER — IRON SUCROSE 20 MG/ML
200 INJECTION, SOLUTION INTRAVENOUS EVERY 24 HOURS
Refills: 0 | Status: COMPLETED | OUTPATIENT
Start: 2025-07-25 | End: 2025-07-27

## 2025-07-24 RX ORDER — POTASSIUM PHOSPHATE, MONOBASIC POTASSIUM PHOSPHATE, DIBASIC INJECTION, 236; 224 MG/ML; MG/ML
15 SOLUTION, CONCENTRATE INTRAVENOUS ONCE
Refills: 0 | Status: COMPLETED | OUTPATIENT
Start: 2025-07-24 | End: 2025-07-24

## 2025-07-24 RX ADMIN — POTASSIUM PHOSPHATE, MONOBASIC POTASSIUM PHOSPHATE, DIBASIC INJECTION, 62.5 MILLIMOLE(S): 236; 224 SOLUTION, CONCENTRATE INTRAVENOUS at 09:14

## 2025-07-24 RX ADMIN — Medication 10 MG/HR: at 06:04

## 2025-07-24 RX ADMIN — AMLODIPINE BESYLATE 10 MILLIGRAM(S): 10 TABLET ORAL at 06:04

## 2025-07-24 RX ADMIN — Medication 10 MG/HR: at 17:07

## 2025-07-24 RX ADMIN — CEFTRIAXONE 100 MILLIGRAM(S): 500 INJECTION, POWDER, FOR SOLUTION INTRAMUSCULAR; INTRAVENOUS at 12:20

## 2025-07-24 RX ADMIN — DABRAFENIB 150 MILLIGRAM(S): 50 CAPSULE ORAL at 09:12

## 2025-07-24 RX ADMIN — TRAMETINIB 2 MILLIGRAM(S): 0.05 POWDER, FOR SOLUTION ORAL at 22:26

## 2025-07-24 RX ADMIN — DABRAFENIB 150 MILLIGRAM(S): 50 CAPSULE ORAL at 22:25

## 2025-07-25 LAB
ALBUMIN SERPL ELPH-MCNC: 2.4 G/DL — LOW (ref 3.3–5)
ALP SERPL-CCNC: 72 U/L — SIGNIFICANT CHANGE UP (ref 40–120)
ALT FLD-CCNC: 104 U/L — HIGH (ref 10–45)
ANION GAP SERPL CALC-SCNC: 9 MMOL/L — SIGNIFICANT CHANGE UP (ref 5–17)
AST SERPL-CCNC: 33 U/L — SIGNIFICANT CHANGE UP (ref 10–40)
BASOPHILS # BLD AUTO: 0.08 K/UL — SIGNIFICANT CHANGE UP (ref 0–0.2)
BASOPHILS NFR BLD AUTO: 0.8 % — SIGNIFICANT CHANGE UP (ref 0–2)
BILIRUB SERPL-MCNC: 0.8 MG/DL — SIGNIFICANT CHANGE UP (ref 0.2–1.2)
BLD GP AB SCN SERPL QL: NEGATIVE — SIGNIFICANT CHANGE UP
BUN SERPL-MCNC: 21 MG/DL — SIGNIFICANT CHANGE UP (ref 7–23)
CALCIUM SERPL-MCNC: 8.4 MG/DL — SIGNIFICANT CHANGE UP (ref 8.4–10.5)
CHLORIDE SERPL-SCNC: 101 MMOL/L — SIGNIFICANT CHANGE UP (ref 96–108)
CO2 SERPL-SCNC: 22 MMOL/L — SIGNIFICANT CHANGE UP (ref 22–31)
CREAT SERPL-MCNC: 0.9 MG/DL — SIGNIFICANT CHANGE UP (ref 0.5–1.3)
CULTURE RESULTS: SIGNIFICANT CHANGE UP
CULTURE RESULTS: SIGNIFICANT CHANGE UP
EGFR: 90 ML/MIN/1.73M2 — SIGNIFICANT CHANGE UP
EGFR: 90 ML/MIN/1.73M2 — SIGNIFICANT CHANGE UP
EOSINOPHIL # BLD AUTO: 0.18 K/UL — SIGNIFICANT CHANGE UP (ref 0–0.5)
EOSINOPHIL NFR BLD AUTO: 1.9 % — SIGNIFICANT CHANGE UP (ref 0–6)
GLUCOSE BLDC GLUCOMTR-MCNC: 102 MG/DL — HIGH (ref 70–99)
GLUCOSE BLDC GLUCOMTR-MCNC: 118 MG/DL — HIGH (ref 70–99)
GLUCOSE BLDC GLUCOMTR-MCNC: 147 MG/DL — HIGH (ref 70–99)
GLUCOSE BLDC GLUCOMTR-MCNC: 158 MG/DL — HIGH (ref 70–99)
GLUCOSE BLDC GLUCOMTR-MCNC: 205 MG/DL — HIGH (ref 70–99)
GLUCOSE SERPL-MCNC: 96 MG/DL — SIGNIFICANT CHANGE UP (ref 70–99)
HCT VFR BLD CALC: 26.9 % — LOW (ref 39–50)
HGB BLD-MCNC: 8.8 G/DL — LOW (ref 13–17)
IMM GRANULOCYTES # BLD AUTO: 0.78 K/UL — HIGH (ref 0–0.07)
IMM GRANULOCYTES NFR BLD AUTO: 8 % — HIGH (ref 0–0.9)
LYMPHOCYTES # BLD AUTO: 1.36 K/UL — SIGNIFICANT CHANGE UP (ref 1–3.3)
LYMPHOCYTES NFR BLD AUTO: 14 % — SIGNIFICANT CHANGE UP (ref 13–44)
MAGNESIUM SERPL-MCNC: 1.8 MG/DL — SIGNIFICANT CHANGE UP (ref 1.6–2.6)
MCHC RBC-ENTMCNC: 29.3 PG — SIGNIFICANT CHANGE UP (ref 27–34)
MCHC RBC-ENTMCNC: 32.7 G/DL — SIGNIFICANT CHANGE UP (ref 32–36)
MCV RBC AUTO: 89.7 FL — SIGNIFICANT CHANGE UP (ref 80–100)
MONOCYTES # BLD AUTO: 1.01 K/UL — HIGH (ref 0–0.9)
MONOCYTES NFR BLD AUTO: 10.4 % — SIGNIFICANT CHANGE UP (ref 2–14)
NEUTROPHILS # BLD AUTO: 6.31 K/UL — SIGNIFICANT CHANGE UP (ref 1.8–7.4)
NEUTROPHILS NFR BLD AUTO: 64.9 % — SIGNIFICANT CHANGE UP (ref 43–77)
NRBC # BLD AUTO: 0 K/UL — SIGNIFICANT CHANGE UP (ref 0–0)
NRBC # FLD: 0 K/UL — SIGNIFICANT CHANGE UP (ref 0–0)
NRBC BLD AUTO-RTO: 0 /100 WBCS — SIGNIFICANT CHANGE UP (ref 0–0)
PHOSPHATE SERPL-MCNC: 2.5 MG/DL — SIGNIFICANT CHANGE UP (ref 2.5–4.5)
PLATELET # BLD AUTO: 244 K/UL — SIGNIFICANT CHANGE UP (ref 150–400)
PMV BLD: 11.3 FL — SIGNIFICANT CHANGE UP (ref 7–13)
POTASSIUM SERPL-MCNC: 4.4 MMOL/L — SIGNIFICANT CHANGE UP (ref 3.5–5.3)
POTASSIUM SERPL-SCNC: 4.4 MMOL/L — SIGNIFICANT CHANGE UP (ref 3.5–5.3)
PROT SERPL-MCNC: 4.8 G/DL — LOW (ref 6–8.3)
RBC # BLD: 3 M/UL — LOW (ref 4.2–5.8)
RBC # FLD: 16 % — HIGH (ref 10.3–14.5)
RH IG SCN BLD-IMP: NEGATIVE — SIGNIFICANT CHANGE UP
SODIUM SERPL-SCNC: 132 MMOL/L — LOW (ref 135–145)
SPECIMEN SOURCE: SIGNIFICANT CHANGE UP
SPECIMEN SOURCE: SIGNIFICANT CHANGE UP
WBC # BLD: 9.72 K/UL — SIGNIFICANT CHANGE UP (ref 3.8–10.5)
WBC # FLD AUTO: 9.72 K/UL — SIGNIFICANT CHANGE UP (ref 3.8–10.5)

## 2025-07-25 PROCEDURE — 99231 SBSQ HOSP IP/OBS SF/LOW 25: CPT | Mod: GC

## 2025-07-25 PROCEDURE — 99233 SBSQ HOSP IP/OBS HIGH 50: CPT | Mod: GC

## 2025-07-25 RX ADMIN — CEFTRIAXONE 100 MILLIGRAM(S): 500 INJECTION, POWDER, FOR SOLUTION INTRAMUSCULAR; INTRAVENOUS at 11:15

## 2025-07-25 RX ADMIN — Medication 10 MG/HR: at 12:55

## 2025-07-25 RX ADMIN — DABRAFENIB 150 MILLIGRAM(S): 50 CAPSULE ORAL at 09:33

## 2025-07-25 RX ADMIN — AMLODIPINE BESYLATE 10 MILLIGRAM(S): 10 TABLET ORAL at 06:00

## 2025-07-25 RX ADMIN — DABRAFENIB 150 MILLIGRAM(S): 50 CAPSULE ORAL at 21:50

## 2025-07-25 RX ADMIN — TRAMETINIB 2 MILLIGRAM(S): 0.05 POWDER, FOR SOLUTION ORAL at 21:50

## 2025-07-25 RX ADMIN — IRON SUCROSE 100 MILLIGRAM(S): 20 INJECTION, SOLUTION INTRAVENOUS at 06:01

## 2025-07-25 RX ADMIN — Medication 10 MG/HR: at 22:14

## 2025-07-26 LAB
A1C WITH ESTIMATED AVERAGE GLUCOSE RESULT: 5.5 % — SIGNIFICANT CHANGE UP (ref 4–5.6)
ADD ON TEST-SPECIMEN IN LAB: SIGNIFICANT CHANGE UP
ALBUMIN SERPL ELPH-MCNC: 2.3 G/DL — LOW (ref 3.3–5)
ALP SERPL-CCNC: 68 U/L — SIGNIFICANT CHANGE UP (ref 40–120)
ALT FLD-CCNC: 70 U/L — HIGH (ref 10–45)
ANION GAP SERPL CALC-SCNC: 7 MMOL/L — SIGNIFICANT CHANGE UP (ref 5–17)
APTT BLD: 29.9 SEC — SIGNIFICANT CHANGE UP (ref 26.1–36.8)
AST SERPL-CCNC: 21 U/L — SIGNIFICANT CHANGE UP (ref 10–40)
BASOPHILS # BLD AUTO: 0.09 K/UL — SIGNIFICANT CHANGE UP (ref 0–0.2)
BASOPHILS NFR BLD AUTO: 1.1 % — SIGNIFICANT CHANGE UP (ref 0–2)
BILIRUB SERPL-MCNC: 0.6 MG/DL — SIGNIFICANT CHANGE UP (ref 0.2–1.2)
BUN SERPL-MCNC: 23 MG/DL — SIGNIFICANT CHANGE UP (ref 7–23)
CALCIUM SERPL-MCNC: 8 MG/DL — LOW (ref 8.4–10.5)
CHLORIDE SERPL-SCNC: 101 MMOL/L — SIGNIFICANT CHANGE UP (ref 96–108)
CO2 SERPL-SCNC: 23 MMOL/L — SIGNIFICANT CHANGE UP (ref 22–31)
CREAT SERPL-MCNC: 0.84 MG/DL — SIGNIFICANT CHANGE UP (ref 0.5–1.3)
EGFR: 92 ML/MIN/1.73M2 — SIGNIFICANT CHANGE UP
EGFR: 92 ML/MIN/1.73M2 — SIGNIFICANT CHANGE UP
EOSINOPHIL # BLD AUTO: 0.13 K/UL — SIGNIFICANT CHANGE UP (ref 0–0.5)
EOSINOPHIL NFR BLD AUTO: 1.6 % — SIGNIFICANT CHANGE UP (ref 0–6)
ESTIMATED AVERAGE GLUCOSE: 111 MG/DL — SIGNIFICANT CHANGE UP (ref 68–114)
GLUCOSE BLDC GLUCOMTR-MCNC: 111 MG/DL — HIGH (ref 70–99)
GLUCOSE SERPL-MCNC: 104 MG/DL — HIGH (ref 70–99)
HCT VFR BLD CALC: 25.5 % — LOW (ref 39–50)
HGB BLD-MCNC: 8.2 G/DL — LOW (ref 13–17)
IMM GRANULOCYTES # BLD AUTO: 0.61 K/UL — HIGH (ref 0–0.07)
IMM GRANULOCYTES NFR BLD AUTO: 7.7 % — HIGH (ref 0–0.9)
INR BLD: 1.16 RATIO — SIGNIFICANT CHANGE UP (ref 0.85–1.16)
LYMPHOCYTES # BLD AUTO: 1.02 K/UL — SIGNIFICANT CHANGE UP (ref 1–3.3)
LYMPHOCYTES NFR BLD AUTO: 12.9 % — LOW (ref 13–44)
MAGNESIUM SERPL-MCNC: 1.9 MG/DL — SIGNIFICANT CHANGE UP (ref 1.6–2.6)
MCHC RBC-ENTMCNC: 29.2 PG — SIGNIFICANT CHANGE UP (ref 27–34)
MCHC RBC-ENTMCNC: 32.2 G/DL — SIGNIFICANT CHANGE UP (ref 32–36)
MCV RBC AUTO: 90.7 FL — SIGNIFICANT CHANGE UP (ref 80–100)
MONOCYTES # BLD AUTO: 0.96 K/UL — HIGH (ref 0–0.9)
MONOCYTES NFR BLD AUTO: 12.2 % — SIGNIFICANT CHANGE UP (ref 2–14)
NEUTROPHILS # BLD AUTO: 5.07 K/UL — SIGNIFICANT CHANGE UP (ref 1.8–7.4)
NEUTROPHILS NFR BLD AUTO: 64.5 % — SIGNIFICANT CHANGE UP (ref 43–77)
NRBC # BLD AUTO: 0 K/UL — SIGNIFICANT CHANGE UP (ref 0–0)
NRBC # FLD: 0 K/UL — SIGNIFICANT CHANGE UP (ref 0–0)
NRBC BLD AUTO-RTO: 0 /100 WBCS — SIGNIFICANT CHANGE UP (ref 0–0)
PHOSPHATE SERPL-MCNC: 2.6 MG/DL — SIGNIFICANT CHANGE UP (ref 2.5–4.5)
PLATELET # BLD AUTO: 252 K/UL — SIGNIFICANT CHANGE UP (ref 150–400)
PMV BLD: 10.9 FL — SIGNIFICANT CHANGE UP (ref 7–13)
POTASSIUM SERPL-MCNC: 4.1 MMOL/L — SIGNIFICANT CHANGE UP (ref 3.5–5.3)
POTASSIUM SERPL-SCNC: 4.1 MMOL/L — SIGNIFICANT CHANGE UP (ref 3.5–5.3)
PROT SERPL-MCNC: 4.5 G/DL — LOW (ref 6–8.3)
PROTHROM AB SERPL-ACNC: 13.3 SEC — SIGNIFICANT CHANGE UP (ref 9.9–13.4)
RBC # BLD: 2.81 M/UL — LOW (ref 4.2–5.8)
RBC # FLD: 16 % — HIGH (ref 10.3–14.5)
SODIUM SERPL-SCNC: 131 MMOL/L — LOW (ref 135–145)
WBC # BLD: 7.88 K/UL — SIGNIFICANT CHANGE UP (ref 3.8–10.5)
WBC # FLD AUTO: 7.88 K/UL — SIGNIFICANT CHANGE UP (ref 3.8–10.5)

## 2025-07-26 PROCEDURE — 99233 SBSQ HOSP IP/OBS HIGH 50: CPT | Mod: GC

## 2025-07-26 RX ADMIN — AMLODIPINE BESYLATE 10 MILLIGRAM(S): 10 TABLET ORAL at 05:50

## 2025-07-26 RX ADMIN — IRON SUCROSE 100 MILLIGRAM(S): 20 INJECTION, SOLUTION INTRAVENOUS at 05:48

## 2025-07-26 RX ADMIN — DABRAFENIB 150 MILLIGRAM(S): 50 CAPSULE ORAL at 09:06

## 2025-07-26 RX ADMIN — TRAMETINIB 2 MILLIGRAM(S): 0.05 POWDER, FOR SOLUTION ORAL at 21:00

## 2025-07-26 RX ADMIN — Medication 10 MG/HR: at 05:57

## 2025-07-26 RX ADMIN — Medication 40 MILLIGRAM(S): at 17:03

## 2025-07-26 RX ADMIN — DABRAFENIB 150 MILLIGRAM(S): 50 CAPSULE ORAL at 21:00

## 2025-07-27 LAB
ALBUMIN SERPL ELPH-MCNC: 2.5 G/DL — LOW (ref 3.3–5)
ALP SERPL-CCNC: 70 U/L — SIGNIFICANT CHANGE UP (ref 40–120)
ALT FLD-CCNC: 53 U/L — HIGH (ref 10–45)
ANION GAP SERPL CALC-SCNC: 9 MMOL/L — SIGNIFICANT CHANGE UP (ref 5–17)
APTT BLD: 28 SEC — SIGNIFICANT CHANGE UP (ref 26.1–36.8)
AST SERPL-CCNC: 19 U/L — SIGNIFICANT CHANGE UP (ref 10–40)
BASOPHILS # BLD AUTO: 0.07 K/UL — SIGNIFICANT CHANGE UP (ref 0–0.2)
BASOPHILS NFR BLD AUTO: 1 % — SIGNIFICANT CHANGE UP (ref 0–2)
BILIRUB SERPL-MCNC: 0.6 MG/DL — SIGNIFICANT CHANGE UP (ref 0.2–1.2)
BUN SERPL-MCNC: 27 MG/DL — HIGH (ref 7–23)
CALCIUM SERPL-MCNC: 8.2 MG/DL — LOW (ref 8.4–10.5)
CHLORIDE SERPL-SCNC: 102 MMOL/L — SIGNIFICANT CHANGE UP (ref 96–108)
CO2 SERPL-SCNC: 23 MMOL/L — SIGNIFICANT CHANGE UP (ref 22–31)
CREAT SERPL-MCNC: 0.9 MG/DL — SIGNIFICANT CHANGE UP (ref 0.5–1.3)
EGFR: 90 ML/MIN/1.73M2 — SIGNIFICANT CHANGE UP
EGFR: 90 ML/MIN/1.73M2 — SIGNIFICANT CHANGE UP
EOSINOPHIL # BLD AUTO: 0.1 K/UL — SIGNIFICANT CHANGE UP (ref 0–0.5)
EOSINOPHIL NFR BLD AUTO: 1.4 % — SIGNIFICANT CHANGE UP (ref 0–6)
GLUCOSE SERPL-MCNC: 93 MG/DL — SIGNIFICANT CHANGE UP (ref 70–99)
HCT VFR BLD CALC: 27 % — LOW (ref 39–50)
HGB BLD-MCNC: 8.4 G/DL — LOW (ref 13–17)
IMM GRANULOCYTES # BLD AUTO: 0.43 K/UL — HIGH (ref 0–0.07)
IMM GRANULOCYTES NFR BLD AUTO: 6 % — HIGH (ref 0–0.9)
INR BLD: 1.1 RATIO — SIGNIFICANT CHANGE UP (ref 0.85–1.16)
LYMPHOCYTES # BLD AUTO: 1.01 K/UL — SIGNIFICANT CHANGE UP (ref 1–3.3)
LYMPHOCYTES NFR BLD AUTO: 14.2 % — SIGNIFICANT CHANGE UP (ref 13–44)
MAGNESIUM SERPL-MCNC: 1.9 MG/DL — SIGNIFICANT CHANGE UP (ref 1.6–2.6)
MCHC RBC-ENTMCNC: 28.5 PG — SIGNIFICANT CHANGE UP (ref 27–34)
MCHC RBC-ENTMCNC: 31.1 G/DL — LOW (ref 32–36)
MCV RBC AUTO: 91.5 FL — SIGNIFICANT CHANGE UP (ref 80–100)
MONOCYTES # BLD AUTO: 0.99 K/UL — HIGH (ref 0–0.9)
MONOCYTES NFR BLD AUTO: 13.9 % — SIGNIFICANT CHANGE UP (ref 2–14)
NEUTROPHILS # BLD AUTO: 4.53 K/UL — SIGNIFICANT CHANGE UP (ref 1.8–7.4)
NEUTROPHILS NFR BLD AUTO: 63.5 % — SIGNIFICANT CHANGE UP (ref 43–77)
NRBC # BLD AUTO: 0 K/UL — SIGNIFICANT CHANGE UP (ref 0–0)
NRBC # FLD: 0 K/UL — SIGNIFICANT CHANGE UP (ref 0–0)
NRBC BLD AUTO-RTO: 0 /100 WBCS — SIGNIFICANT CHANGE UP (ref 0–0)
PHOSPHATE SERPL-MCNC: 2.4 MG/DL — LOW (ref 2.5–4.5)
PLATELET # BLD AUTO: 278 K/UL — SIGNIFICANT CHANGE UP (ref 150–400)
PMV BLD: 10.8 FL — SIGNIFICANT CHANGE UP (ref 7–13)
POTASSIUM SERPL-MCNC: 4.1 MMOL/L — SIGNIFICANT CHANGE UP (ref 3.5–5.3)
POTASSIUM SERPL-SCNC: 4.1 MMOL/L — SIGNIFICANT CHANGE UP (ref 3.5–5.3)
PROT SERPL-MCNC: 4.5 G/DL — LOW (ref 6–8.3)
PROTHROM AB SERPL-ACNC: 12.5 SEC — SIGNIFICANT CHANGE UP (ref 9.9–13.4)
RBC # BLD: 2.95 M/UL — LOW (ref 4.2–5.8)
RBC # FLD: 16.5 % — HIGH (ref 10.3–14.5)
SODIUM SERPL-SCNC: 134 MMOL/L — LOW (ref 135–145)
WBC # BLD: 7.13 K/UL — SIGNIFICANT CHANGE UP (ref 3.8–10.5)
WBC # FLD AUTO: 7.13 K/UL — SIGNIFICANT CHANGE UP (ref 3.8–10.5)

## 2025-07-27 PROCEDURE — 99233 SBSQ HOSP IP/OBS HIGH 50: CPT | Mod: GC

## 2025-07-27 RX ORDER — SODIUM PHOSPHATE,DIBASIC DIHYD
15 POWDER (GRAM) MISCELLANEOUS ONCE
Refills: 0 | Status: COMPLETED | OUTPATIENT
Start: 2025-07-27 | End: 2025-07-27

## 2025-07-27 RX ORDER — IRON SUCROSE 20 MG/ML
200 INJECTION, SOLUTION INTRAVENOUS EVERY 24 HOURS
Refills: 0 | Status: DISCONTINUED | OUTPATIENT
Start: 2025-07-27 | End: 2025-07-27

## 2025-07-27 RX ORDER — IRON SUCROSE 20 MG/ML
200 INJECTION, SOLUTION INTRAVENOUS ONCE
Refills: 0 | Status: COMPLETED | OUTPATIENT
Start: 2025-07-28 | End: 2025-07-28

## 2025-07-27 RX ADMIN — Medication 1 APPLICATION(S): at 05:51

## 2025-07-27 RX ADMIN — Medication 40 MILLIGRAM(S): at 17:34

## 2025-07-27 RX ADMIN — Medication 500000 UNIT(S): at 17:35

## 2025-07-27 RX ADMIN — DABRAFENIB 150 MILLIGRAM(S): 50 CAPSULE ORAL at 08:39

## 2025-07-27 RX ADMIN — AMLODIPINE BESYLATE 10 MILLIGRAM(S): 10 TABLET ORAL at 05:51

## 2025-07-27 RX ADMIN — Medication 63.75 MILLIMOLE(S): at 08:34

## 2025-07-27 RX ADMIN — Medication 500000 UNIT(S): at 23:14

## 2025-07-27 RX ADMIN — Medication 40 MILLIGRAM(S): at 05:50

## 2025-07-27 RX ADMIN — IRON SUCROSE 100 MILLIGRAM(S): 20 INJECTION, SOLUTION INTRAVENOUS at 05:50

## 2025-07-27 RX ADMIN — DABRAFENIB 150 MILLIGRAM(S): 50 CAPSULE ORAL at 20:54

## 2025-07-27 RX ADMIN — TRAMETINIB 2 MILLIGRAM(S): 0.05 POWDER, FOR SOLUTION ORAL at 20:55

## 2025-07-28 ENCOUNTER — TRANSCRIPTION ENCOUNTER (OUTPATIENT)
Age: 75
End: 2025-07-28

## 2025-07-28 VITALS
TEMPERATURE: 99 F | DIASTOLIC BLOOD PRESSURE: 59 MMHG | OXYGEN SATURATION: 97 % | SYSTOLIC BLOOD PRESSURE: 105 MMHG | RESPIRATION RATE: 18 BRPM | HEART RATE: 77 BPM

## 2025-07-28 LAB
ALBUMIN SERPL ELPH-MCNC: 2.4 G/DL — LOW (ref 3.3–5)
ALP SERPL-CCNC: 77 U/L — SIGNIFICANT CHANGE UP (ref 40–120)
ALT FLD-CCNC: 42 U/L — SIGNIFICANT CHANGE UP (ref 10–45)
ANION GAP SERPL CALC-SCNC: 9 MMOL/L — SIGNIFICANT CHANGE UP (ref 5–17)
APTT BLD: 29.3 SEC — SIGNIFICANT CHANGE UP (ref 26.1–36.8)
AST SERPL-CCNC: 15 U/L — SIGNIFICANT CHANGE UP (ref 10–40)
BASOPHILS # BLD AUTO: 0.1 K/UL — SIGNIFICANT CHANGE UP (ref 0–0.2)
BASOPHILS NFR BLD AUTO: 1.4 % — SIGNIFICANT CHANGE UP (ref 0–2)
BILIRUB SERPL-MCNC: 0.6 MG/DL — SIGNIFICANT CHANGE UP (ref 0.2–1.2)
BLD GP AB SCN SERPL QL: NEGATIVE — SIGNIFICANT CHANGE UP
BUN SERPL-MCNC: 23 MG/DL — SIGNIFICANT CHANGE UP (ref 7–23)
CALCIUM SERPL-MCNC: 8.1 MG/DL — LOW (ref 8.4–10.5)
CHLORIDE SERPL-SCNC: 102 MMOL/L — SIGNIFICANT CHANGE UP (ref 96–108)
CO2 SERPL-SCNC: 23 MMOL/L — SIGNIFICANT CHANGE UP (ref 22–31)
CREAT SERPL-MCNC: 0.8 MG/DL — SIGNIFICANT CHANGE UP (ref 0.5–1.3)
EGFR: 93 ML/MIN/1.73M2 — SIGNIFICANT CHANGE UP
EGFR: 93 ML/MIN/1.73M2 — SIGNIFICANT CHANGE UP
EOSINOPHIL # BLD AUTO: 0.08 K/UL — SIGNIFICANT CHANGE UP (ref 0–0.5)
EOSINOPHIL NFR BLD AUTO: 1.1 % — SIGNIFICANT CHANGE UP (ref 0–6)
GLUCOSE SERPL-MCNC: 97 MG/DL — SIGNIFICANT CHANGE UP (ref 70–99)
HCT VFR BLD CALC: 27.5 % — LOW (ref 39–50)
HGB BLD-MCNC: 8.8 G/DL — LOW (ref 13–17)
IMM GRANULOCYTES # BLD AUTO: 0.27 K/UL — HIGH (ref 0–0.07)
IMM GRANULOCYTES NFR BLD AUTO: 3.7 % — HIGH (ref 0–0.9)
INR BLD: 1.16 RATIO — SIGNIFICANT CHANGE UP (ref 0.85–1.16)
LYMPHOCYTES # BLD AUTO: 1.05 K/UL — SIGNIFICANT CHANGE UP (ref 1–3.3)
LYMPHOCYTES NFR BLD AUTO: 14.5 % — SIGNIFICANT CHANGE UP (ref 13–44)
MAGNESIUM SERPL-MCNC: 1.9 MG/DL — SIGNIFICANT CHANGE UP (ref 1.6–2.6)
MCHC RBC-ENTMCNC: 29.1 PG — SIGNIFICANT CHANGE UP (ref 27–34)
MCHC RBC-ENTMCNC: 32 G/DL — SIGNIFICANT CHANGE UP (ref 32–36)
MCV RBC AUTO: 91.1 FL — SIGNIFICANT CHANGE UP (ref 80–100)
MONOCYTES # BLD AUTO: 0.88 K/UL — SIGNIFICANT CHANGE UP (ref 0–0.9)
MONOCYTES NFR BLD AUTO: 12.2 % — SIGNIFICANT CHANGE UP (ref 2–14)
NEUTROPHILS # BLD AUTO: 4.84 K/UL — SIGNIFICANT CHANGE UP (ref 1.8–7.4)
NEUTROPHILS NFR BLD AUTO: 67.1 % — SIGNIFICANT CHANGE UP (ref 43–77)
NRBC # BLD AUTO: 0 K/UL — SIGNIFICANT CHANGE UP (ref 0–0)
NRBC # FLD: 0 K/UL — SIGNIFICANT CHANGE UP (ref 0–0)
NRBC BLD AUTO-RTO: 0 /100 WBCS — SIGNIFICANT CHANGE UP (ref 0–0)
PHOSPHATE SERPL-MCNC: 2.3 MG/DL — LOW (ref 2.5–4.5)
PLATELET # BLD AUTO: 275 K/UL — SIGNIFICANT CHANGE UP (ref 150–400)
PMV BLD: 10.1 FL — SIGNIFICANT CHANGE UP (ref 7–13)
POTASSIUM SERPL-MCNC: 4.1 MMOL/L — SIGNIFICANT CHANGE UP (ref 3.5–5.3)
POTASSIUM SERPL-SCNC: 4.1 MMOL/L — SIGNIFICANT CHANGE UP (ref 3.5–5.3)
PROT SERPL-MCNC: 4.6 G/DL — LOW (ref 6–8.3)
PROTHROM AB SERPL-ACNC: 13.3 SEC — SIGNIFICANT CHANGE UP (ref 9.9–13.4)
RBC # BLD: 3.02 M/UL — LOW (ref 4.2–5.8)
RBC # FLD: 16.1 % — HIGH (ref 10.3–14.5)
RH IG SCN BLD-IMP: NEGATIVE — SIGNIFICANT CHANGE UP
SODIUM SERPL-SCNC: 134 MMOL/L — LOW (ref 135–145)
WBC # BLD: 7.22 K/UL — SIGNIFICANT CHANGE UP (ref 3.8–10.5)
WBC # FLD AUTO: 7.22 K/UL — SIGNIFICANT CHANGE UP (ref 3.8–10.5)

## 2025-07-28 PROCEDURE — C1751: CPT

## 2025-07-28 PROCEDURE — 83735 ASSAY OF MAGNESIUM: CPT

## 2025-07-28 PROCEDURE — 84478 ASSAY OF TRIGLYCERIDES: CPT

## 2025-07-28 PROCEDURE — 87086 URINE CULTURE/COLONY COUNT: CPT

## 2025-07-28 PROCEDURE — 82962 GLUCOSE BLOOD TEST: CPT

## 2025-07-28 PROCEDURE — 87040 BLOOD CULTURE FOR BACTERIA: CPT

## 2025-07-28 PROCEDURE — 71260 CT THORAX DX C+: CPT

## 2025-07-28 PROCEDURE — 86923 COMPATIBILITY TEST ELECTRIC: CPT

## 2025-07-28 PROCEDURE — 83520 IMMUNOASSAY QUANT NOS NONAB: CPT

## 2025-07-28 PROCEDURE — 85379 FIBRIN DEGRADATION QUANT: CPT

## 2025-07-28 PROCEDURE — 87507 IADNA-DNA/RNA PROBE TQ 12-25: CPT

## 2025-07-28 PROCEDURE — 85014 HEMATOCRIT: CPT

## 2025-07-28 PROCEDURE — 81001 URINALYSIS AUTO W/SCOPE: CPT

## 2025-07-28 PROCEDURE — 84443 ASSAY THYROID STIM HORMONE: CPT

## 2025-07-28 PROCEDURE — 85025 COMPLETE CBC W/AUTO DIFF WBC: CPT

## 2025-07-28 PROCEDURE — 70450 CT HEAD/BRAIN W/O DYE: CPT

## 2025-07-28 PROCEDURE — P9040: CPT

## 2025-07-28 PROCEDURE — 97110 THERAPEUTIC EXERCISES: CPT

## 2025-07-28 PROCEDURE — 97165 OT EVAL LOW COMPLEX 30 MIN: CPT

## 2025-07-28 PROCEDURE — 99232 SBSQ HOSP IP/OBS MODERATE 35: CPT | Mod: GC

## 2025-07-28 PROCEDURE — 87045 FECES CULTURE AEROBIC BACT: CPT

## 2025-07-28 PROCEDURE — P9016: CPT

## 2025-07-28 PROCEDURE — 86901 BLOOD TYPING SEROLOGIC RH(D): CPT

## 2025-07-28 PROCEDURE — 80053 COMPREHEN METABOLIC PANEL: CPT

## 2025-07-28 PROCEDURE — C1889: CPT

## 2025-07-28 PROCEDURE — 92610 EVALUATE SWALLOWING FUNCTION: CPT

## 2025-07-28 PROCEDURE — 93970 EXTREMITY STUDY: CPT

## 2025-07-28 PROCEDURE — 94003 VENT MGMT INPAT SUBQ DAY: CPT

## 2025-07-28 PROCEDURE — 82728 ASSAY OF FERRITIN: CPT

## 2025-07-28 PROCEDURE — 96376 TX/PRO/DX INJ SAME DRUG ADON: CPT

## 2025-07-28 PROCEDURE — 82435 ASSAY OF BLOOD CHLORIDE: CPT

## 2025-07-28 PROCEDURE — 86900 BLOOD TYPING SEROLOGIC ABO: CPT

## 2025-07-28 PROCEDURE — 87077 CULTURE AEROBIC IDENTIFY: CPT

## 2025-07-28 PROCEDURE — 36569 INSJ PICC 5 YR+ W/O IMAGING: CPT

## 2025-07-28 PROCEDURE — C1894: CPT

## 2025-07-28 PROCEDURE — 82330 ASSAY OF CALCIUM: CPT

## 2025-07-28 PROCEDURE — 87640 STAPH A DNA AMP PROBE: CPT

## 2025-07-28 PROCEDURE — 99239 HOSP IP/OBS DSCHRG MGMT >30: CPT

## 2025-07-28 PROCEDURE — 87641 MR-STAPH DNA AMP PROBE: CPT

## 2025-07-28 PROCEDURE — 82803 BLOOD GASES ANY COMBINATION: CPT

## 2025-07-28 PROCEDURE — 85027 COMPLETE CBC AUTOMATED: CPT

## 2025-07-28 PROCEDURE — C1887: CPT

## 2025-07-28 PROCEDURE — 85385 FIBRINOGEN ANTIGEN: CPT

## 2025-07-28 PROCEDURE — P9037: CPT

## 2025-07-28 PROCEDURE — 97530 THERAPEUTIC ACTIVITIES: CPT

## 2025-07-28 PROCEDURE — 71045 X-RAY EXAM CHEST 1 VIEW: CPT

## 2025-07-28 PROCEDURE — C1769: CPT

## 2025-07-28 PROCEDURE — 83605 ASSAY OF LACTIC ACID: CPT

## 2025-07-28 PROCEDURE — 36415 COLL VENOUS BLD VENIPUNCTURE: CPT

## 2025-07-28 PROCEDURE — 85045 AUTOMATED RETICULOCYTE COUNT: CPT

## 2025-07-28 PROCEDURE — 84100 ASSAY OF PHOSPHORUS: CPT

## 2025-07-28 PROCEDURE — 86850 RBC ANTIBODY SCREEN: CPT

## 2025-07-28 PROCEDURE — 85730 THROMBOPLASTIN TIME PARTIAL: CPT

## 2025-07-28 PROCEDURE — C1760: CPT

## 2025-07-28 PROCEDURE — 82607 VITAMIN B-12: CPT

## 2025-07-28 PROCEDURE — 93975 VASCULAR STUDY: CPT

## 2025-07-28 PROCEDURE — 85610 PROTHROMBIN TIME: CPT

## 2025-07-28 PROCEDURE — 97161 PT EVAL LOW COMPLEX 20 MIN: CPT

## 2025-07-28 PROCEDURE — 99285 EMERGENCY DEPT VISIT HI MDM: CPT | Mod: 25

## 2025-07-28 PROCEDURE — P9059: CPT

## 2025-07-28 PROCEDURE — 74176 CT ABD & PELVIS W/O CONTRAST: CPT

## 2025-07-28 PROCEDURE — P9100: CPT

## 2025-07-28 PROCEDURE — C1052: CPT

## 2025-07-28 PROCEDURE — 74230 X-RAY XM SWLNG FUNCJ C+: CPT

## 2025-07-28 PROCEDURE — 82746 ASSAY OF FOLIC ACID SERUM: CPT

## 2025-07-28 PROCEDURE — 96374 THER/PROPH/DIAG INJ IV PUSH: CPT

## 2025-07-28 PROCEDURE — 84295 ASSAY OF SERUM SODIUM: CPT

## 2025-07-28 PROCEDURE — 83010 ASSAY OF HAPTOGLOBIN QUANT: CPT

## 2025-07-28 PROCEDURE — 85384 FIBRINOGEN ACTIVITY: CPT

## 2025-07-28 PROCEDURE — 87046 STOOL CULTR AEROBIC BACT EA: CPT

## 2025-07-28 PROCEDURE — 84132 ASSAY OF SERUM POTASSIUM: CPT

## 2025-07-28 PROCEDURE — 74178 CT ABD&PLV WO CNTR FLWD CNTR: CPT

## 2025-07-28 PROCEDURE — 94002 VENT MGMT INPAT INIT DAY: CPT

## 2025-07-28 PROCEDURE — 82947 ASSAY GLUCOSE BLOOD QUANT: CPT

## 2025-07-28 PROCEDURE — 83615 LACTATE (LD) (LDH) ENZYME: CPT

## 2025-07-28 PROCEDURE — 85018 HEMOGLOBIN: CPT

## 2025-07-28 PROCEDURE — 83036 HEMOGLOBIN GLYCOSYLATED A1C: CPT

## 2025-07-28 PROCEDURE — 96375 TX/PRO/DX INJ NEW DRUG ADDON: CPT

## 2025-07-28 PROCEDURE — 74177 CT ABD & PELVIS W/CONTRAST: CPT

## 2025-07-28 PROCEDURE — 92611 MOTION FLUOROSCOPY/SWALLOW: CPT

## 2025-07-28 RX ORDER — SOD PHOS DI, MONO/K PHOS MONO 250 MG
1 TABLET ORAL ONCE
Refills: 0 | Status: COMPLETED | OUTPATIENT
Start: 2025-07-28 | End: 2025-07-28

## 2025-07-28 RX ADMIN — Medication 40 MILLIGRAM(S): at 06:04

## 2025-07-28 RX ADMIN — DABRAFENIB 150 MILLIGRAM(S): 50 CAPSULE ORAL at 08:34

## 2025-07-28 RX ADMIN — Medication 500000 UNIT(S): at 06:01

## 2025-07-28 RX ADMIN — IRON SUCROSE 100 MILLIGRAM(S): 20 INJECTION, SOLUTION INTRAVENOUS at 06:03

## 2025-07-28 RX ADMIN — AMLODIPINE BESYLATE 10 MILLIGRAM(S): 10 TABLET ORAL at 06:00

## 2025-07-28 RX ADMIN — Medication 1 APPLICATION(S): at 06:04

## 2025-07-28 RX ADMIN — Medication 500000 UNIT(S): at 11:52

## 2025-07-28 RX ADMIN — Medication 1 PACKET(S): at 10:34

## 2025-08-19 ENCOUNTER — RESULT REVIEW (OUTPATIENT)
Age: 75
End: 2025-08-19

## 2025-09-09 ENCOUNTER — RESULT REVIEW (OUTPATIENT)
Age: 75
End: 2025-09-09

## 2025-09-16 ENCOUNTER — RESULT REVIEW (OUTPATIENT)
Age: 75
End: 2025-09-16

## 2025-09-16 ENCOUNTER — TRANSCRIPTION ENCOUNTER (OUTPATIENT)
Age: 75
End: 2025-09-16

## (undated) DEVICE — CATH IV SAFE BC 22G X 1" (BLUE)

## (undated) DEVICE — TUBING SUCTION 20FT

## (undated) DEVICE — FOLEY HOLDER STATLOCK 2 WAY ADULT

## (undated) DEVICE — SYR ALLIANCE II INFLATION 60ML

## (undated) DEVICE — TUBING SUCTION CONN 6FT STERILE

## (undated) DEVICE — SENSOR O2 FINGER ADULT

## (undated) DEVICE — CATH IV SAFE BC 20G X 1.16" (PINK)

## (undated) DEVICE — SOL INJ NS 0.9% 500ML 2 PORT

## (undated) DEVICE — BITE BLOCK ADULT 20 X 27MM (GREEN)

## (undated) DEVICE — TUBING IV SET GRAVITY 3Y 100" MACRO

## (undated) DEVICE — BALLOON US ENDO

## (undated) DEVICE — SUCTION YANKAUER NO CONTROL VENT

## (undated) DEVICE — PACK IV START WITH CHG